# Patient Record
Sex: FEMALE | Race: WHITE | NOT HISPANIC OR LATINO | Employment: FULL TIME | URBAN - METROPOLITAN AREA
[De-identification: names, ages, dates, MRNs, and addresses within clinical notes are randomized per-mention and may not be internally consistent; named-entity substitution may affect disease eponyms.]

---

## 2017-02-28 ENCOUNTER — TRANSCRIBE ORDERS (OUTPATIENT)
Dept: ADMINISTRATIVE | Facility: HOSPITAL | Age: 37
End: 2017-02-28

## 2017-02-28 DIAGNOSIS — M25.532 LEFT WRIST PAIN: ICD-10-CM

## 2017-02-28 DIAGNOSIS — M25.531 RIGHT WRIST PAIN: Primary | ICD-10-CM

## 2017-03-13 ENCOUNTER — HOSPITAL ENCOUNTER (OUTPATIENT)
Dept: NEUROLOGY | Facility: CLINIC | Age: 37
Discharge: HOME | End: 2017-03-13
Payer: COMMERCIAL

## 2017-03-13 DIAGNOSIS — M25.532 LEFT WRIST PAIN: ICD-10-CM

## 2017-03-13 DIAGNOSIS — G56.03 BILATERAL CARPAL TUNNEL SYNDROME: ICD-10-CM

## 2017-03-13 DIAGNOSIS — M25.531 RIGHT WRIST PAIN: ICD-10-CM

## 2017-03-13 PROCEDURE — 95886 MUSC TEST DONE W/N TEST COMP: CPT

## 2017-03-13 PROCEDURE — 95913 NRV CNDJ TEST 13/> STUDIES: CPT

## 2017-05-24 ENCOUNTER — HOSPITAL ENCOUNTER (OUTPATIENT)
Dept: RADIOLOGY | Facility: CLINIC | Age: 37
Discharge: HOME | End: 2017-05-24
Admitting: EMERGENCY MEDICINE
Payer: COMMERCIAL

## 2017-05-24 ENCOUNTER — OFFICE VISIT (OUTPATIENT)
Dept: URGENT CARE | Facility: CLINIC | Age: 37
End: 2017-05-24
Payer: COMMERCIAL

## 2017-05-24 DIAGNOSIS — Z13.820 ENCOUNTER FOR SCREENING FOR OSTEOPOROSIS: ICD-10-CM

## 2017-05-24 DIAGNOSIS — M25.572 PAIN IN LEFT ANKLE: ICD-10-CM

## 2017-05-24 PROCEDURE — S9088 SERVICES PROVIDED IN URGENT: HCPCS

## 2017-05-24 PROCEDURE — 73610 X-RAY EXAM OF ANKLE: CPT

## 2017-05-24 PROCEDURE — 99213 OFFICE O/P EST LOW 20 MIN: CPT

## 2017-07-21 ENCOUNTER — APPOINTMENT (EMERGENCY)
Dept: RADIOLOGY | Facility: HOSPITAL | Age: 37
End: 2017-07-21
Payer: COMMERCIAL

## 2017-07-21 ENCOUNTER — HOSPITAL ENCOUNTER (EMERGENCY)
Facility: HOSPITAL | Age: 37
Discharge: HOME | End: 2017-07-22
Attending: EMERGENCY MEDICINE | Admitting: EMERGENCY MEDICINE
Payer: COMMERCIAL

## 2017-07-21 DIAGNOSIS — N39.0 URINARY TRACT INFECTION: Primary | ICD-10-CM

## 2017-07-21 LAB
ALBUMIN SERPL BCP-MCNC: 3.6 G/DL (ref 3.5–5)
ALP SERPL-CCNC: 70 U/L (ref 46–116)
ALT SERPL W P-5'-P-CCNC: 35 U/L (ref 12–78)
ANION GAP SERPL CALCULATED.3IONS-SCNC: 7 MMOL/L (ref 4–13)
AST SERPL W P-5'-P-CCNC: 18 U/L (ref 5–45)
BACTERIA UR QL AUTO: ABNORMAL /HPF
BASOPHILS # BLD AUTO: 0.04 THOUSANDS/ΜL (ref 0–0.1)
BASOPHILS NFR BLD AUTO: 1 % (ref 0–1)
BILIRUB SERPL-MCNC: 0.2 MG/DL (ref 0.2–1)
BILIRUB UR QL STRIP: NEGATIVE
BUN SERPL-MCNC: 10 MG/DL (ref 5–25)
CALCIUM SERPL-MCNC: 8.6 MG/DL (ref 8.3–10.1)
CHLORIDE SERPL-SCNC: 104 MMOL/L (ref 100–108)
CLARITY UR: CLEAR
CO2 SERPL-SCNC: 29 MMOL/L (ref 21–32)
COLOR UR: YELLOW
CREAT SERPL-MCNC: 0.8 MG/DL (ref 0.6–1.3)
EOSINOPHIL # BLD AUTO: 0.26 THOUSAND/ΜL (ref 0–0.61)
EOSINOPHIL NFR BLD AUTO: 3 % (ref 0–6)
ERYTHROCYTE [DISTWIDTH] IN BLOOD BY AUTOMATED COUNT: 12.7 % (ref 11.6–15.1)
GFR SERPL CREATININE-BSD FRML MDRD: >60 ML/MIN/1.73SQ M
GLUCOSE SERPL-MCNC: 92 MG/DL (ref 65–140)
GLUCOSE UR STRIP-MCNC: NEGATIVE MG/DL
HCG UR QL: NEGATIVE
HCT VFR BLD AUTO: 37.4 % (ref 34.8–46.1)
HGB BLD-MCNC: 12.7 G/DL (ref 11.5–15.4)
HGB UR QL STRIP.AUTO: ABNORMAL
KETONES UR STRIP-MCNC: NEGATIVE MG/DL
LEUKOCYTE ESTERASE UR QL STRIP: NEGATIVE
LIPASE SERPL-CCNC: 105 U/L (ref 73–393)
LYMPHOCYTES # BLD AUTO: 3.17 THOUSANDS/ΜL (ref 0.6–4.47)
LYMPHOCYTES NFR BLD AUTO: 36 % (ref 14–44)
MCH RBC QN AUTO: 29.3 PG (ref 26.8–34.3)
MCHC RBC AUTO-ENTMCNC: 34 G/DL (ref 31.4–37.4)
MCV RBC AUTO: 86 FL (ref 82–98)
MONOCYTES # BLD AUTO: 0.49 THOUSAND/ΜL (ref 0.17–1.22)
MONOCYTES NFR BLD AUTO: 6 % (ref 4–12)
NEUTROPHILS # BLD AUTO: 4.8 THOUSANDS/ΜL (ref 1.85–7.62)
NEUTS SEG NFR BLD AUTO: 54 % (ref 43–75)
NITRITE UR QL STRIP: NEGATIVE
NON-SQ EPI CELLS URNS QL MICRO: ABNORMAL /HPF
PH UR STRIP.AUTO: 5.5 [PH] (ref 4.5–8)
PLATELET # BLD AUTO: 314 THOUSANDS/UL (ref 149–390)
PMV BLD AUTO: 9.6 FL (ref 8.9–12.7)
POTASSIUM SERPL-SCNC: 3.9 MMOL/L (ref 3.5–5.3)
PROT SERPL-MCNC: 7.6 G/DL (ref 6.4–8.2)
PROT UR STRIP-MCNC: NEGATIVE MG/DL
RBC # BLD AUTO: 4.34 MILLION/UL (ref 3.81–5.12)
RBC #/AREA URNS AUTO: ABNORMAL /HPF
SODIUM SERPL-SCNC: 140 MMOL/L (ref 136–145)
SP GR UR STRIP.AUTO: >=1.03 (ref 1–1.03)
UROBILINOGEN UR QL STRIP.AUTO: 0.2 E.U./DL
WBC # BLD AUTO: 8.76 THOUSAND/UL (ref 4.31–10.16)
WBC #/AREA URNS AUTO: ABNORMAL /HPF

## 2017-07-21 PROCEDURE — 81025 URINE PREGNANCY TEST: CPT | Performed by: EMERGENCY MEDICINE

## 2017-07-21 PROCEDURE — 71020 HB CHEST X-RAY 2VW FRONTAL&LATL: CPT

## 2017-07-21 PROCEDURE — 80053 COMPREHEN METABOLIC PANEL: CPT | Performed by: EMERGENCY MEDICINE

## 2017-07-21 PROCEDURE — 84443 ASSAY THYROID STIM HORMONE: CPT | Performed by: EMERGENCY MEDICINE

## 2017-07-21 PROCEDURE — 85025 COMPLETE CBC W/AUTO DIFF WBC: CPT | Performed by: EMERGENCY MEDICINE

## 2017-07-21 PROCEDURE — 81001 URINALYSIS AUTO W/SCOPE: CPT | Performed by: EMERGENCY MEDICINE

## 2017-07-21 PROCEDURE — 96374 THER/PROPH/DIAG INJ IV PUSH: CPT

## 2017-07-21 PROCEDURE — 87070 CULTURE OTHR SPECIMN AEROBIC: CPT | Performed by: EMERGENCY MEDICINE

## 2017-07-21 PROCEDURE — 83690 ASSAY OF LIPASE: CPT | Performed by: EMERGENCY MEDICINE

## 2017-07-21 PROCEDURE — 36415 COLL VENOUS BLD VENIPUNCTURE: CPT | Performed by: EMERGENCY MEDICINE

## 2017-07-21 PROCEDURE — 96361 HYDRATE IV INFUSION ADD-ON: CPT

## 2017-07-21 RX ORDER — LEVOTHYROXINE SODIUM 0.05 MG/1
TABLET ORAL
COMMUNITY
Start: 2015-11-09 | End: 2017-09-10

## 2017-07-21 RX ORDER — IPRATROPIUM BROMIDE AND ALBUTEROL SULFATE 2.5; .5 MG/3ML; MG/3ML
3 SOLUTION RESPIRATORY (INHALATION) ONCE
Status: COMPLETED | OUTPATIENT
Start: 2017-07-21 | End: 2017-07-21

## 2017-07-21 RX ORDER — ALBUTEROL SULFATE 2.5 MG/3ML
2.5 SOLUTION RESPIRATORY (INHALATION) ONCE
Status: COMPLETED | OUTPATIENT
Start: 2017-07-21 | End: 2017-07-21

## 2017-07-21 RX ORDER — KETOROLAC TROMETHAMINE 30 MG/ML
15 INJECTION, SOLUTION INTRAMUSCULAR; INTRAVENOUS ONCE
Status: COMPLETED | OUTPATIENT
Start: 2017-07-21 | End: 2017-07-21

## 2017-07-21 RX ADMIN — KETOROLAC TROMETHAMINE 15 MG: 30 INJECTION, SOLUTION INTRAMUSCULAR; INTRAVENOUS at 22:55

## 2017-07-21 RX ADMIN — SODIUM CHLORIDE 1000 ML: 0.9 INJECTION, SOLUTION INTRAVENOUS at 22:54

## 2017-07-21 RX ADMIN — ALBUTEROL SULFATE 2.5 MG: 2.5 SOLUTION RESPIRATORY (INHALATION) at 22:49

## 2017-07-21 RX ADMIN — IPRATROPIUM BROMIDE AND ALBUTEROL SULFATE 3 ML: .5; 3 SOLUTION RESPIRATORY (INHALATION) at 23:37

## 2017-07-22 VITALS
TEMPERATURE: 97.8 F | DIASTOLIC BLOOD PRESSURE: 64 MMHG | HEART RATE: 72 BPM | SYSTOLIC BLOOD PRESSURE: 112 MMHG | OXYGEN SATURATION: 98 % | HEIGHT: 72 IN | WEIGHT: 293 LBS | RESPIRATION RATE: 21 BRPM | BODY MASS INDEX: 39.68 KG/M2

## 2017-07-22 LAB — TSH SERPL DL<=0.05 MIU/L-ACNC: 3.73 UIU/ML (ref 0.36–3.74)

## 2017-07-22 PROCEDURE — 99284 EMERGENCY DEPT VISIT MOD MDM: CPT

## 2017-07-22 PROCEDURE — 51798 US URINE CAPACITY MEASURE: CPT

## 2017-07-22 PROCEDURE — 94640 AIRWAY INHALATION TREATMENT: CPT

## 2017-07-22 RX ORDER — CIPROFLOXACIN 250 MG/1
250 TABLET, FILM COATED ORAL EVERY 12 HOURS
Qty: 6 TABLET | Refills: 0 | Status: SHIPPED | OUTPATIENT
Start: 2017-07-22 | End: 2017-07-25

## 2017-07-22 RX ORDER — PHENAZOPYRIDINE HYDROCHLORIDE 200 MG/1
200 TABLET, FILM COATED ORAL
Qty: 10 TABLET | Refills: 0 | Status: SHIPPED | OUTPATIENT
Start: 2017-07-22 | End: 2017-09-10

## 2017-07-22 RX ORDER — PHENAZOPYRIDINE HYDROCHLORIDE 100 MG/1
200 TABLET, FILM COATED ORAL ONCE
Status: COMPLETED | OUTPATIENT
Start: 2017-07-22 | End: 2017-07-22

## 2017-07-22 RX ORDER — ALBUTEROL SULFATE 90 UG/1
AEROSOL, METERED RESPIRATORY (INHALATION)
Qty: 1 INHALER | Refills: 0 | Status: SHIPPED | OUTPATIENT
Start: 2017-07-22 | End: 2018-04-16

## 2017-07-22 RX ORDER — IPRATROPIUM BROMIDE AND ALBUTEROL SULFATE 2.5; .5 MG/3ML; MG/3ML
3 SOLUTION RESPIRATORY (INHALATION) ONCE
Status: DISCONTINUED | OUTPATIENT
Start: 2017-07-22 | End: 2017-07-22 | Stop reason: HOSPADM

## 2017-07-22 RX ORDER — PREDNISONE 20 MG/1
40 TABLET ORAL ONCE
Status: COMPLETED | OUTPATIENT
Start: 2017-07-22 | End: 2017-07-22

## 2017-07-22 RX ORDER — CIPROFLOXACIN 250 MG/1
250 TABLET, FILM COATED ORAL ONCE
Status: COMPLETED | OUTPATIENT
Start: 2017-07-22 | End: 2017-07-22

## 2017-07-22 RX ADMIN — PHENAZOPYRIDINE HYDROCHLORIDE 200 MG: 100 TABLET ORAL at 01:44

## 2017-07-22 RX ADMIN — CIPROFLOXACIN HYDROCHLORIDE 250 MG: 250 TABLET, FILM COATED ORAL at 01:46

## 2017-07-22 RX ADMIN — PREDNISONE 40 MG: 20 TABLET ORAL at 01:45

## 2017-07-24 LAB — BACTERIA THROAT CULT: NORMAL

## 2017-09-07 ENCOUNTER — APPOINTMENT (EMERGENCY)
Dept: CT IMAGING | Facility: HOSPITAL | Age: 37
End: 2017-09-07
Payer: COMMERCIAL

## 2017-09-07 ENCOUNTER — HOSPITAL ENCOUNTER (EMERGENCY)
Facility: HOSPITAL | Age: 37
Discharge: HOME | End: 2017-09-07
Attending: EMERGENCY MEDICINE | Admitting: EMERGENCY MEDICINE
Payer: COMMERCIAL

## 2017-09-07 ENCOUNTER — APPOINTMENT (EMERGENCY)
Dept: ULTRASOUND IMAGING | Facility: HOSPITAL | Age: 37
End: 2017-09-07
Payer: COMMERCIAL

## 2017-09-07 VITALS
DIASTOLIC BLOOD PRESSURE: 58 MMHG | TEMPERATURE: 97.2 F | SYSTOLIC BLOOD PRESSURE: 111 MMHG | WEIGHT: 293 LBS | HEART RATE: 58 BPM | BODY MASS INDEX: 39.68 KG/M2 | RESPIRATION RATE: 17 BRPM | HEIGHT: 72 IN | OXYGEN SATURATION: 96 %

## 2017-09-07 DIAGNOSIS — R51.9 ACUTE HEADACHE: Primary | ICD-10-CM

## 2017-09-07 DIAGNOSIS — R93.5 ABNORMAL CT SCAN, PELVIS: ICD-10-CM

## 2017-09-07 DIAGNOSIS — K57.32 SIGMOID DIVERTICULITIS: ICD-10-CM

## 2017-09-07 DIAGNOSIS — N83.201 CYST OF RIGHT OVARY: ICD-10-CM

## 2017-09-07 LAB
ALBUMIN SERPL BCP-MCNC: 3.6 G/DL (ref 3.5–5)
ALP SERPL-CCNC: 68 U/L (ref 46–116)
ALT SERPL W P-5'-P-CCNC: 33 U/L (ref 12–78)
ANION GAP SERPL CALCULATED.3IONS-SCNC: 6 MMOL/L (ref 4–13)
APPEARANCE CSF: CLEAR
AST SERPL W P-5'-P-CCNC: 15 U/L (ref 5–45)
BACTERIA UR QL AUTO: ABNORMAL /HPF
BASOPHILS # BLD AUTO: 0.02 THOUSANDS/ΜL (ref 0–0.1)
BASOPHILS NFR BLD AUTO: 0 % (ref 0–1)
BILIRUB SERPL-MCNC: 0.2 MG/DL (ref 0.2–1)
BILIRUB UR QL STRIP: NEGATIVE
BUN SERPL-MCNC: 12 MG/DL (ref 5–25)
CALCIUM SERPL-MCNC: 8.9 MG/DL (ref 8.3–10.1)
CHLORIDE SERPL-SCNC: 104 MMOL/L (ref 100–108)
CLARITY UR: NORMAL
CO2 SERPL-SCNC: 30 MMOL/L (ref 21–32)
COLOR UR: YELLOW
CREAT SERPL-MCNC: 0.8 MG/DL (ref 0.6–1.3)
EOSINOPHIL # BLD AUTO: 0.16 THOUSAND/ΜL (ref 0–0.61)
EOSINOPHIL NFR BLD AUTO: 2 % (ref 0–6)
ERYTHROCYTE [DISTWIDTH] IN BLOOD BY AUTOMATED COUNT: 12.3 % (ref 11.6–15.1)
GFR SERPL CREATININE-BSD FRML MDRD: 95 ML/MIN/1.73SQ M
GLUCOSE CSF-MCNC: 65 MG/DL (ref 50–80)
GLUCOSE SERPL-MCNC: 98 MG/DL (ref 65–140)
GLUCOSE UR STRIP-MCNC: NEGATIVE MG/DL
GRAM STN SPEC: NORMAL
HCG UR QL: NEGATIVE
HCT VFR BLD AUTO: 38.9 % (ref 34.8–46.1)
HGB BLD-MCNC: 12.9 G/DL (ref 11.5–15.4)
HGB UR QL STRIP.AUTO: NORMAL
INR PPP: 0.92 (ref 0.86–1.16)
KETONES UR STRIP-MCNC: NEGATIVE MG/DL
LEUKOCYTE ESTERASE UR QL STRIP: NEGATIVE
LIPASE SERPL-CCNC: 110 U/L (ref 73–393)
LYMPHOCYTES # BLD AUTO: 2.17 THOUSANDS/ΜL (ref 0.6–4.47)
LYMPHOCYTES NFR BLD AUTO: 32 % (ref 14–44)
MAGNESIUM SERPL-MCNC: 1.9 MG/DL (ref 1.6–2.6)
MCH RBC QN AUTO: 28.6 PG (ref 26.8–34.3)
MCHC RBC AUTO-ENTMCNC: 33.2 G/DL (ref 31.4–37.4)
MCV RBC AUTO: 86 FL (ref 82–98)
MONOCYTES # BLD AUTO: 0.47 THOUSAND/ΜL (ref 0.17–1.22)
MONOCYTES NFR BLD AUTO: 7 % (ref 4–12)
NEUTROPHILS # BLD AUTO: 4.02 THOUSANDS/ΜL (ref 1.85–7.62)
NEUTS SEG NFR BLD AUTO: 59 % (ref 43–75)
NITRITE UR QL STRIP: NEGATIVE
NON-SQ EPI CELLS URNS QL MICRO: ABNORMAL /HPF
PH UR STRIP.AUTO: 5.5 [PH] (ref 4.5–8)
PLATELET # BLD AUTO: 301 THOUSANDS/UL (ref 149–390)
PMV BLD AUTO: 9.9 FL (ref 8.9–12.7)
POTASSIUM SERPL-SCNC: 4 MMOL/L (ref 3.5–5.3)
PROT CSF-MCNC: 34 MG/DL (ref 15–45)
PROT SERPL-MCNC: 7.3 G/DL (ref 6.4–8.2)
PROT UR STRIP-MCNC: NEGATIVE MG/DL
PROTHROMBIN TIME: 12.3 SECONDS (ref 12.1–14.4)
RBC # BLD AUTO: 4.51 MILLION/UL (ref 3.81–5.12)
RBC # CSF MANUAL: 0 UL (ref 0–10)
RBC # CSF MANUAL: 2 UL (ref 0–10)
RBC #/AREA URNS AUTO: ABNORMAL /HPF
SODIUM SERPL-SCNC: 140 MMOL/L (ref 136–145)
SP GR UR STRIP.AUTO: 1.02 (ref 1–1.03)
TOTAL CELLS COUNTED BLD: NO
TUBE # CSF: 1
UROBILINOGEN UR QL STRIP.AUTO: 0.2 E.U./DL
WBC # BLD AUTO: 6.84 THOUSAND/UL (ref 4.31–10.16)
WBC # CSF AUTO: 1 /UL (ref 0–5)
WBC #/AREA URNS AUTO: ABNORMAL /HPF

## 2017-09-07 PROCEDURE — 80053 COMPREHEN METABOLIC PANEL: CPT | Performed by: EMERGENCY MEDICINE

## 2017-09-07 PROCEDURE — 99284 EMERGENCY DEPT VISIT MOD MDM: CPT

## 2017-09-07 PROCEDURE — 81025 URINE PREGNANCY TEST: CPT | Performed by: EMERGENCY MEDICINE

## 2017-09-07 PROCEDURE — 87070 CULTURE OTHR SPECIMN AEROBIC: CPT | Performed by: EMERGENCY MEDICINE

## 2017-09-07 PROCEDURE — 36415 COLL VENOUS BLD VENIPUNCTURE: CPT | Performed by: EMERGENCY MEDICINE

## 2017-09-07 PROCEDURE — 85610 PROTHROMBIN TIME: CPT | Performed by: EMERGENCY MEDICINE

## 2017-09-07 PROCEDURE — 76830 TRANSVAGINAL US NON-OB: CPT

## 2017-09-07 PROCEDURE — 84157 ASSAY OF PROTEIN OTHER: CPT | Performed by: EMERGENCY MEDICINE

## 2017-09-07 PROCEDURE — 89051 BODY FLUID CELL COUNT: CPT | Performed by: EMERGENCY MEDICINE

## 2017-09-07 PROCEDURE — 96365 THER/PROPH/DIAG IV INF INIT: CPT

## 2017-09-07 PROCEDURE — 81001 URINALYSIS AUTO W/SCOPE: CPT | Performed by: EMERGENCY MEDICINE

## 2017-09-07 PROCEDURE — 83690 ASSAY OF LIPASE: CPT | Performed by: EMERGENCY MEDICINE

## 2017-09-07 PROCEDURE — 83735 ASSAY OF MAGNESIUM: CPT | Performed by: EMERGENCY MEDICINE

## 2017-09-07 PROCEDURE — 85025 COMPLETE CBC W/AUTO DIFF WBC: CPT | Performed by: EMERGENCY MEDICINE

## 2017-09-07 PROCEDURE — 89050 BODY FLUID CELL COUNT: CPT | Performed by: EMERGENCY MEDICINE

## 2017-09-07 PROCEDURE — 76856 US EXAM PELVIC COMPLETE: CPT

## 2017-09-07 PROCEDURE — 74177 CT ABD & PELVIS W/CONTRAST: CPT

## 2017-09-07 PROCEDURE — 96375 TX/PRO/DX INJ NEW DRUG ADDON: CPT

## 2017-09-07 PROCEDURE — 82945 GLUCOSE OTHER FLUID: CPT | Performed by: EMERGENCY MEDICINE

## 2017-09-07 PROCEDURE — 96361 HYDRATE IV INFUSION ADD-ON: CPT

## 2017-09-07 PROCEDURE — 70450 CT HEAD/BRAIN W/O DYE: CPT

## 2017-09-07 RX ORDER — ONDANSETRON 8 MG/1
8 TABLET, ORALLY DISINTEGRATING ORAL EVERY 8 HOURS PRN
Qty: 20 TABLET | Refills: 0 | Status: SHIPPED | OUTPATIENT
Start: 2017-09-07 | End: 2017-11-08 | Stop reason: ALTCHOICE

## 2017-09-07 RX ORDER — KETOROLAC TROMETHAMINE 30 MG/ML
10 INJECTION, SOLUTION INTRAMUSCULAR; INTRAVENOUS ONCE
Status: COMPLETED | OUTPATIENT
Start: 2017-09-07 | End: 2017-09-07

## 2017-09-07 RX ORDER — METOCLOPRAMIDE HYDROCHLORIDE 5 MG/ML
10 INJECTION INTRAMUSCULAR; INTRAVENOUS ONCE
Status: COMPLETED | OUTPATIENT
Start: 2017-09-07 | End: 2017-09-07

## 2017-09-07 RX ORDER — DIPHENHYDRAMINE HYDROCHLORIDE 50 MG/ML
25 INJECTION INTRAMUSCULAR; INTRAVENOUS ONCE
Status: COMPLETED | OUTPATIENT
Start: 2017-09-07 | End: 2017-09-07

## 2017-09-07 RX ORDER — METRONIDAZOLE 500 MG/1
500 TABLET ORAL EVERY 8 HOURS SCHEDULED
Qty: 15 TABLET | Refills: 0 | Status: SHIPPED | OUTPATIENT
Start: 2017-09-07 | End: 2017-09-10

## 2017-09-07 RX ORDER — MAGNESIUM SULFATE 1 G/100ML
1 INJECTION INTRAVENOUS ONCE
Status: COMPLETED | OUTPATIENT
Start: 2017-09-07 | End: 2017-09-07

## 2017-09-07 RX ORDER — LEVOFLOXACIN 750 MG/1
750 TABLET ORAL EVERY 24 HOURS
Qty: 5 TABLET | Refills: 0 | Status: SHIPPED | OUTPATIENT
Start: 2017-09-07 | End: 2017-09-10

## 2017-09-07 RX ADMIN — SODIUM CHLORIDE 1000 ML: 0.9 INJECTION, SOLUTION INTRAVENOUS at 15:35

## 2017-09-07 RX ADMIN — MAGNESIUM SULFATE HEPTAHYDRATE 1 G: 1 INJECTION, SOLUTION INTRAVENOUS at 15:38

## 2017-09-07 RX ADMIN — IOHEXOL 100 ML: 350 INJECTION, SOLUTION INTRAVENOUS at 16:18

## 2017-09-07 RX ADMIN — METOCLOPRAMIDE 10 MG: 5 INJECTION, SOLUTION INTRAMUSCULAR; INTRAVENOUS at 15:36

## 2017-09-07 RX ADMIN — KETOROLAC TROMETHAMINE 9.9 MG: 30 INJECTION, SOLUTION INTRAMUSCULAR; INTRAVENOUS at 19:37

## 2017-09-07 RX ADMIN — DIPHENHYDRAMINE HYDROCHLORIDE 25 MG: 50 INJECTION, SOLUTION INTRAMUSCULAR; INTRAVENOUS at 15:37

## 2017-09-10 ENCOUNTER — HOSPITAL ENCOUNTER (EMERGENCY)
Facility: HOSPITAL | Age: 37
Discharge: HOME | End: 2017-09-10
Attending: EMERGENCY MEDICINE | Admitting: EMERGENCY MEDICINE
Payer: COMMERCIAL

## 2017-09-10 VITALS
HEART RATE: 52 BPM | TEMPERATURE: 98.4 F | BODY MASS INDEX: 39.68 KG/M2 | HEIGHT: 72 IN | DIASTOLIC BLOOD PRESSURE: 65 MMHG | OXYGEN SATURATION: 98 % | WEIGHT: 293 LBS | RESPIRATION RATE: 16 BRPM | SYSTOLIC BLOOD PRESSURE: 115 MMHG

## 2017-09-10 DIAGNOSIS — R51.9 HEADACHE: Primary | ICD-10-CM

## 2017-09-10 LAB
ALBUMIN SERPL BCP-MCNC: 3.5 G/DL (ref 3.5–5)
ALP SERPL-CCNC: 62 U/L (ref 46–116)
ALT SERPL W P-5'-P-CCNC: 38 U/L (ref 12–78)
ANION GAP SERPL CALCULATED.3IONS-SCNC: 6 MMOL/L (ref 4–13)
AST SERPL W P-5'-P-CCNC: 18 U/L (ref 5–45)
BASOPHILS # BLD AUTO: 0.02 THOUSANDS/ΜL (ref 0–0.1)
BASOPHILS NFR BLD AUTO: 0 % (ref 0–1)
BILIRUB SERPL-MCNC: 0.2 MG/DL (ref 0.2–1)
BUN SERPL-MCNC: 7 MG/DL (ref 5–25)
CALCIUM SERPL-MCNC: 8.5 MG/DL (ref 8.3–10.1)
CHLORIDE SERPL-SCNC: 105 MMOL/L (ref 100–108)
CO2 SERPL-SCNC: 30 MMOL/L (ref 21–32)
CREAT SERPL-MCNC: 0.78 MG/DL (ref 0.6–1.3)
EOSINOPHIL # BLD AUTO: 0.19 THOUSAND/ΜL (ref 0–0.61)
EOSINOPHIL NFR BLD AUTO: 3 % (ref 0–6)
ERYTHROCYTE [DISTWIDTH] IN BLOOD BY AUTOMATED COUNT: 12.3 % (ref 11.6–15.1)
ERYTHROCYTE [SEDIMENTATION RATE] IN BLOOD: 15 MM/HOUR (ref 0–20)
GFR SERPL CREATININE-BSD FRML MDRD: 98 ML/MIN/1.73SQ M
GLUCOSE SERPL-MCNC: 93 MG/DL (ref 65–140)
HCG UR QL: NEGATIVE
HCT VFR BLD AUTO: 38.7 % (ref 34.8–46.1)
HGB BLD-MCNC: 12.9 G/DL (ref 11.5–15.4)
LYMPHOCYTES # BLD AUTO: 2.09 THOUSANDS/ΜL (ref 0.6–4.47)
LYMPHOCYTES NFR BLD AUTO: 30 % (ref 14–44)
MCH RBC QN AUTO: 28.9 PG (ref 26.8–34.3)
MCHC RBC AUTO-ENTMCNC: 33.3 G/DL (ref 31.4–37.4)
MCV RBC AUTO: 87 FL (ref 82–98)
MONOCYTES # BLD AUTO: 0.33 THOUSAND/ΜL (ref 0.17–1.22)
MONOCYTES NFR BLD AUTO: 5 % (ref 4–12)
NEUTROPHILS # BLD AUTO: 4.32 THOUSANDS/ΜL (ref 1.85–7.62)
NEUTS SEG NFR BLD AUTO: 62 % (ref 43–75)
PLATELET # BLD AUTO: 266 THOUSANDS/UL (ref 149–390)
PMV BLD AUTO: 9.8 FL (ref 8.9–12.7)
POTASSIUM SERPL-SCNC: 4.1 MMOL/L (ref 3.5–5.3)
PROT SERPL-MCNC: 7.1 G/DL (ref 6.4–8.2)
RBC # BLD AUTO: 4.47 MILLION/UL (ref 3.81–5.12)
SODIUM SERPL-SCNC: 141 MMOL/L (ref 136–145)
WBC # BLD AUTO: 6.95 THOUSAND/UL (ref 4.31–10.16)

## 2017-09-10 PROCEDURE — 85025 COMPLETE CBC W/AUTO DIFF WBC: CPT | Performed by: EMERGENCY MEDICINE

## 2017-09-10 PROCEDURE — 96375 TX/PRO/DX INJ NEW DRUG ADDON: CPT

## 2017-09-10 PROCEDURE — 96374 THER/PROPH/DIAG INJ IV PUSH: CPT

## 2017-09-10 PROCEDURE — 85652 RBC SED RATE AUTOMATED: CPT | Performed by: EMERGENCY MEDICINE

## 2017-09-10 PROCEDURE — 99283 EMERGENCY DEPT VISIT LOW MDM: CPT

## 2017-09-10 PROCEDURE — 96361 HYDRATE IV INFUSION ADD-ON: CPT

## 2017-09-10 PROCEDURE — 80053 COMPREHEN METABOLIC PANEL: CPT | Performed by: EMERGENCY MEDICINE

## 2017-09-10 PROCEDURE — 81025 URINE PREGNANCY TEST: CPT | Performed by: EMERGENCY MEDICINE

## 2017-09-10 RX ORDER — CEPHALEXIN 250 MG/1
500 CAPSULE ORAL ONCE
Status: COMPLETED | OUTPATIENT
Start: 2017-09-10 | End: 2017-09-10

## 2017-09-10 RX ORDER — CAFFEINE CITRATE 20 MG/ML
500 SOLUTION INTRAVENOUS ONCE
Status: DISCONTINUED | OUTPATIENT
Start: 2017-09-10 | End: 2017-09-10

## 2017-09-10 RX ORDER — CAFFEINE CITRATE 20 MG/ML
SOLUTION INTRAVENOUS
Status: DISCONTINUED
Start: 2017-09-10 | End: 2017-09-10 | Stop reason: WASHOUT

## 2017-09-10 RX ORDER — METOCLOPRAMIDE HYDROCHLORIDE 5 MG/ML
10 INJECTION INTRAMUSCULAR; INTRAVENOUS ONCE
Status: COMPLETED | OUTPATIENT
Start: 2017-09-10 | End: 2017-09-10

## 2017-09-10 RX ORDER — DIPHENHYDRAMINE HYDROCHLORIDE 50 MG/ML
25 INJECTION INTRAMUSCULAR; INTRAVENOUS ONCE
Status: COMPLETED | OUTPATIENT
Start: 2017-09-10 | End: 2017-09-10

## 2017-09-10 RX ORDER — HYDROCODONE BITARTRATE AND ACETAMINOPHEN 5; 325 MG/1; MG/1
1 TABLET ORAL EVERY 6 HOURS PRN
COMMUNITY
End: 2017-11-08 | Stop reason: ALTCHOICE

## 2017-09-10 RX ORDER — CEPHALEXIN 500 MG/1
500 CAPSULE ORAL EVERY 6 HOURS SCHEDULED
Qty: 40 CAPSULE | Refills: 0 | Status: SHIPPED | OUTPATIENT
Start: 2017-09-10 | End: 2017-09-20

## 2017-09-10 RX ORDER — CAFFEINE CITRATE 20 MG/ML
180 SOLUTION INTRAVENOUS ONCE
Status: COMPLETED | OUTPATIENT
Start: 2017-09-10 | End: 2017-09-10

## 2017-09-10 RX ORDER — KETOROLAC TROMETHAMINE 30 MG/ML
15 INJECTION, SOLUTION INTRAMUSCULAR; INTRAVENOUS ONCE
Status: COMPLETED | OUTPATIENT
Start: 2017-09-10 | End: 2017-09-10

## 2017-09-10 RX ADMIN — KETOROLAC TROMETHAMINE 15 MG: 30 INJECTION, SOLUTION INTRAMUSCULAR; INTRAVENOUS at 14:07

## 2017-09-10 RX ADMIN — CAFFEINE CITRATE 180 MG: 20 INJECTION, SOLUTION INTRAVENOUS at 14:07

## 2017-09-10 RX ADMIN — METOCLOPRAMIDE 10 MG: 5 INJECTION, SOLUTION INTRAMUSCULAR; INTRAVENOUS at 14:05

## 2017-09-10 RX ADMIN — SODIUM CHLORIDE 1000 ML: 0.9 INJECTION, SOLUTION INTRAVENOUS at 14:04

## 2017-09-10 RX ADMIN — CEPHALEXIN 500 MG: 250 CAPSULE ORAL at 15:42

## 2017-09-10 RX ADMIN — DIPHENHYDRAMINE HYDROCHLORIDE 25 MG: 50 INJECTION, SOLUTION INTRAMUSCULAR; INTRAVENOUS at 14:06

## 2017-09-11 LAB — BACTERIA CSF CULT: NO GROWTH

## 2017-09-13 ENCOUNTER — OFFICE VISIT (OUTPATIENT)
Dept: URGENT CARE | Facility: CLINIC | Age: 37
End: 2017-09-13
Payer: COMMERCIAL

## 2017-09-13 ENCOUNTER — HOSPITAL ENCOUNTER (EMERGENCY)
Facility: HOSPITAL | Age: 37
Discharge: HOME | End: 2017-09-13
Attending: EMERGENCY MEDICINE
Payer: COMMERCIAL

## 2017-09-13 VITALS
RESPIRATION RATE: 18 BRPM | BODY MASS INDEX: 39.68 KG/M2 | SYSTOLIC BLOOD PRESSURE: 110 MMHG | DIASTOLIC BLOOD PRESSURE: 76 MMHG | HEART RATE: 78 BPM | TEMPERATURE: 98 F | OXYGEN SATURATION: 97 % | HEIGHT: 72 IN | WEIGHT: 293 LBS

## 2017-09-13 DIAGNOSIS — R51.9 HEADACHE: Primary | ICD-10-CM

## 2017-09-13 PROCEDURE — 96374 THER/PROPH/DIAG INJ IV PUSH: CPT

## 2017-09-13 PROCEDURE — 99213 OFFICE O/P EST LOW 20 MIN: CPT

## 2017-09-13 PROCEDURE — S9088 SERVICES PROVIDED IN URGENT: HCPCS

## 2017-09-13 PROCEDURE — 99283 EMERGENCY DEPT VISIT LOW MDM: CPT

## 2017-09-13 PROCEDURE — 96375 TX/PRO/DX INJ NEW DRUG ADDON: CPT

## 2017-09-13 RX ORDER — FLUTICASONE PROPIONATE 50 MCG
2 SPRAY, SUSPENSION (ML) NASAL DAILY
Qty: 1 BOTTLE | Refills: 0 | Status: SHIPPED | OUTPATIENT
Start: 2017-09-13 | End: 2017-11-08 | Stop reason: ALTCHOICE

## 2017-09-13 RX ORDER — BUTALBITAL, ACETAMINOPHEN AND CAFFEINE 50; 325; 40 MG/1; MG/1; MG/1
2 TABLET ORAL EVERY 4 HOURS PRN
Qty: 30 TABLET | Refills: 0 | Status: SHIPPED | OUTPATIENT
Start: 2017-09-13 | End: 2017-12-18

## 2017-09-13 RX ORDER — KETOROLAC TROMETHAMINE 30 MG/ML
30 INJECTION, SOLUTION INTRAMUSCULAR; INTRAVENOUS ONCE
Status: COMPLETED | OUTPATIENT
Start: 2017-09-13 | End: 2017-09-13

## 2017-09-13 RX ORDER — METOCLOPRAMIDE HYDROCHLORIDE 5 MG/ML
10 INJECTION INTRAMUSCULAR; INTRAVENOUS ONCE
Status: COMPLETED | OUTPATIENT
Start: 2017-09-13 | End: 2017-09-13

## 2017-09-13 RX ADMIN — KETOROLAC TROMETHAMINE 30 MG: 30 INJECTION, SOLUTION INTRAMUSCULAR; INTRAVENOUS at 22:45

## 2017-09-13 RX ADMIN — METOCLOPRAMIDE 10 MG: 5 INJECTION, SOLUTION INTRAMUSCULAR; INTRAVENOUS at 22:48

## 2017-09-18 ENCOUNTER — ALLSCRIPTS OFFICE VISIT (OUTPATIENT)
Dept: FAMILY MEDICINE CLINIC | Facility: CLINIC | Age: 37
End: 2017-09-18
Payer: COMMERCIAL

## 2017-09-18 DIAGNOSIS — E03.9 HYPOTHYROIDISM: ICD-10-CM

## 2017-09-18 DIAGNOSIS — Z01.818 ENCOUNTER FOR OTHER PREPROCEDURAL EXAMINATION: ICD-10-CM

## 2017-09-18 PROCEDURE — 99213 OFFICE O/P EST LOW 20 MIN: CPT | Performed by: FAMILY MEDICINE

## 2017-09-19 ENCOUNTER — TRANSCRIBE ORDERS (OUTPATIENT)
Dept: ADMINISTRATIVE | Facility: HOSPITAL | Age: 37
End: 2017-09-19

## 2017-09-19 ENCOUNTER — APPOINTMENT (OUTPATIENT)
Dept: LAB | Facility: CLINIC | Age: 37
End: 2017-09-19
Payer: COMMERCIAL

## 2017-09-19 ENCOUNTER — LAB REQUISITION (OUTPATIENT)
Dept: LAB | Facility: HOSPITAL | Age: 37
End: 2017-09-19
Payer: COMMERCIAL

## 2017-09-19 ENCOUNTER — GENERIC CONVERSION - ENCOUNTER (OUTPATIENT)
Dept: OTHER | Facility: OTHER | Age: 37
End: 2017-09-19

## 2017-09-19 DIAGNOSIS — N83.209 CYST OF OVARY, UNSPECIFIED LATERALITY: Primary | ICD-10-CM

## 2017-09-19 DIAGNOSIS — E03.9 HYPOTHYROIDISM: ICD-10-CM

## 2017-09-19 DIAGNOSIS — Z01.419 ENCOUNTER FOR GYNECOLOGICAL EXAMINATION WITHOUT ABNORMAL FINDING: ICD-10-CM

## 2017-09-19 DIAGNOSIS — N83.209 CYST OF OVARY, UNSPECIFIED LATERALITY: ICD-10-CM

## 2017-09-19 LAB
T3 SERPL-MCNC: 1 NG/ML (ref 0.6–1.8)
T4 FREE SERPL-MCNC: 0.75 NG/DL (ref 0.76–1.46)
TSH SERPL DL<=0.05 MIU/L-ACNC: 2.51 UIU/ML (ref 0.36–3.74)

## 2017-09-19 PROCEDURE — 81500 ONCO (OVAR) TWO PROTEINS: CPT

## 2017-09-19 PROCEDURE — 87660 TRICHOMONAS VAGIN DIR PROBE: CPT | Performed by: OBSTETRICS & GYNECOLOGY

## 2017-09-19 PROCEDURE — 87624 HPV HI-RISK TYP POOLED RSLT: CPT | Performed by: OBSTETRICS & GYNECOLOGY

## 2017-09-19 PROCEDURE — 87491 CHLMYD TRACH DNA AMP PROBE: CPT | Performed by: OBSTETRICS & GYNECOLOGY

## 2017-09-19 PROCEDURE — 84439 ASSAY OF FREE THYROXINE: CPT

## 2017-09-19 PROCEDURE — 84443 ASSAY THYROID STIM HORMONE: CPT

## 2017-09-19 PROCEDURE — 87591 N.GONORRHOEAE DNA AMP PROB: CPT | Performed by: OBSTETRICS & GYNECOLOGY

## 2017-09-19 PROCEDURE — 36415 COLL VENOUS BLD VENIPUNCTURE: CPT

## 2017-09-19 PROCEDURE — G0145 SCR C/V CYTO,THINLAYER,RESCR: HCPCS | Performed by: OBSTETRICS & GYNECOLOGY

## 2017-09-19 PROCEDURE — 86376 MICROSOMAL ANTIBODY EACH: CPT

## 2017-09-19 PROCEDURE — 84480 ASSAY TRIIODOTHYRONINE (T3): CPT

## 2017-09-19 PROCEDURE — 87510 GARDNER VAG DNA DIR PROBE: CPT | Performed by: OBSTETRICS & GYNECOLOGY

## 2017-09-19 PROCEDURE — 87480 CANDIDA DNA DIR PROBE: CPT | Performed by: OBSTETRICS & GYNECOLOGY

## 2017-09-20 LAB — THYROPEROXIDASE AB SERPL-ACNC: 14 IU/ML (ref 0–34)

## 2017-09-21 LAB
CANDIDA RRNA VAG QL PROBE: NEGATIVE
G VAGINALIS RRNA GENITAL QL PROBE: NEGATIVE
T VAGINALIS RRNA GENITAL QL PROBE: NEGATIVE

## 2017-09-22 LAB
CHLAMYDIA DNA CVX QL NAA+PROBE: NORMAL
MISCELLANEOUS LAB TEST RESULT: NORMAL
N GONORRHOEA DNA GENITAL QL NAA+PROBE: NORMAL

## 2017-09-25 ENCOUNTER — GENERIC CONVERSION - ENCOUNTER (OUTPATIENT)
Dept: OTHER | Facility: OTHER | Age: 37
End: 2017-09-25

## 2017-09-27 LAB — HPV RRNA GENITAL QL NAA+PROBE: NORMAL

## 2017-09-28 ENCOUNTER — GENERIC CONVERSION - ENCOUNTER (OUTPATIENT)
Dept: OTHER | Facility: OTHER | Age: 37
End: 2017-09-28

## 2017-09-29 ENCOUNTER — GENERIC CONVERSION - ENCOUNTER (OUTPATIENT)
Dept: OTHER | Facility: OTHER | Age: 37
End: 2017-09-29

## 2017-10-02 ENCOUNTER — GENERIC CONVERSION - ENCOUNTER (OUTPATIENT)
Dept: OTHER | Facility: OTHER | Age: 37
End: 2017-10-02

## 2017-10-04 LAB
LAB AP GYN PRIMARY INTERPRETATION: NORMAL
Lab: NORMAL

## 2017-11-02 ENCOUNTER — TRANSCRIBE ORDERS (OUTPATIENT)
Dept: LAB | Facility: CLINIC | Age: 37
End: 2017-11-02

## 2017-11-02 ENCOUNTER — APPOINTMENT (OUTPATIENT)
Dept: LAB | Facility: CLINIC | Age: 37
End: 2017-11-02
Payer: COMMERCIAL

## 2017-11-02 DIAGNOSIS — Z01.818 PREOP EXAMINATION: Primary | ICD-10-CM

## 2017-11-02 DIAGNOSIS — Z01.818 ENCOUNTER FOR OTHER PREPROCEDURAL EXAMINATION: ICD-10-CM

## 2017-11-02 LAB
ERYTHROCYTE [DISTWIDTH] IN BLOOD BY AUTOMATED COUNT: 12.5 % (ref 11.6–15.1)
HCT VFR BLD AUTO: 37.9 % (ref 34.8–46.1)
HGB BLD-MCNC: 12.8 G/DL (ref 11.5–15.4)
MCH RBC QN AUTO: 29.2 PG (ref 26.8–34.3)
MCHC RBC AUTO-ENTMCNC: 33.8 G/DL (ref 31.4–37.4)
MCV RBC AUTO: 87 FL (ref 82–98)
PLATELET # BLD AUTO: 308 THOUSANDS/UL (ref 149–390)
PMV BLD AUTO: 10.4 FL (ref 8.9–12.7)
RBC # BLD AUTO: 4.38 MILLION/UL (ref 3.81–5.12)
WBC # BLD AUTO: 5.34 THOUSAND/UL (ref 4.31–10.16)

## 2017-11-02 PROCEDURE — 36415 COLL VENOUS BLD VENIPUNCTURE: CPT

## 2017-11-02 PROCEDURE — 85027 COMPLETE CBC AUTOMATED: CPT

## 2017-11-03 ENCOUNTER — APPOINTMENT (OUTPATIENT)
Dept: LAB | Facility: CLINIC | Age: 37
End: 2017-11-03
Payer: COMMERCIAL

## 2017-11-03 DIAGNOSIS — Z01.818 PREOP EXAMINATION: ICD-10-CM

## 2017-11-03 LAB
ABO GROUP BLD: NORMAL
BLD GP AB SCN SERPL QL: NEGATIVE
RH BLD: NEGATIVE
SPECIMEN EXPIRATION DATE: NORMAL

## 2017-11-03 PROCEDURE — 86901 BLOOD TYPING SEROLOGIC RH(D): CPT

## 2017-11-03 PROCEDURE — 86850 RBC ANTIBODY SCREEN: CPT

## 2017-11-03 PROCEDURE — 86900 BLOOD TYPING SEROLOGIC ABO: CPT

## 2017-11-08 ENCOUNTER — ANESTHESIA EVENT (OUTPATIENT)
Dept: PERIOP | Facility: HOSPITAL | Age: 37
End: 2017-11-08
Payer: COMMERCIAL

## 2017-11-08 NOTE — ANESTHESIA PREPROCEDURE EVALUATION
Review of Systems/Medical History  Patient summary reviewed    No history of anesthetic complications     Cardiovascular  EKG reviewed,    Pulmonary  Asthma: , ,   Comment: Former Smoker     GI/Hepatic            Endo/Other     GYN       Hematology   Musculoskeletal  Obesity  morbid obesity,        Neurology   Psychology           Physical Exam    Airway    Mallampati score: II  TM Distance: >3 FB  Neck ROM: full     Dental       Cardiovascular      Pulmonary      Other Findings        Anesthesia Plan  ASA Score- 2       Anesthesia Type-   Additional Monitors:   Airway Plan: ETT  Induction- intravenous  Informed Consent- Anesthetic plan and risks discussed with patient

## 2017-11-09 ENCOUNTER — HOSPITAL ENCOUNTER (OUTPATIENT)
Facility: HOSPITAL | Age: 37
Setting detail: OUTPATIENT SURGERY
Discharge: HOME | End: 2017-11-09
Attending: OBSTETRICS & GYNECOLOGY | Admitting: OBSTETRICS & GYNECOLOGY
Payer: COMMERCIAL

## 2017-11-09 ENCOUNTER — ANESTHESIA (OUTPATIENT)
Dept: PERIOP | Facility: HOSPITAL | Age: 37
End: 2017-11-09
Payer: COMMERCIAL

## 2017-11-09 VITALS
DIASTOLIC BLOOD PRESSURE: 66 MMHG | WEIGHT: 293 LBS | BODY MASS INDEX: 39.68 KG/M2 | SYSTOLIC BLOOD PRESSURE: 139 MMHG | HEART RATE: 53 BPM | RESPIRATION RATE: 18 BRPM | OXYGEN SATURATION: 100 % | HEIGHT: 72 IN | TEMPERATURE: 97 F

## 2017-11-09 DIAGNOSIS — N83.209 CYST OF OVARY: ICD-10-CM

## 2017-11-09 PROBLEM — N83.201 RIGHT OVARIAN CYST: Status: ACTIVE | Noted: 2017-11-09

## 2017-11-09 LAB — EXT PREGNANCY TEST URINE: NEGATIVE

## 2017-11-09 PROCEDURE — 81025 URINE PREGNANCY TEST: CPT | Performed by: OBSTETRICS & GYNECOLOGY

## 2017-11-09 PROCEDURE — 88305 TISSUE EXAM BY PATHOLOGIST: CPT | Performed by: OBSTETRICS & GYNECOLOGY

## 2017-11-09 RX ORDER — ROCURONIUM BROMIDE 10 MG/ML
INJECTION, SOLUTION INTRAVENOUS AS NEEDED
Status: DISCONTINUED | OUTPATIENT
Start: 2017-11-09 | End: 2017-11-09 | Stop reason: SURG

## 2017-11-09 RX ORDER — METOCLOPRAMIDE HYDROCHLORIDE 5 MG/ML
10 INJECTION INTRAMUSCULAR; INTRAVENOUS ONCE AS NEEDED
Status: DISCONTINUED | OUTPATIENT
Start: 2017-11-09 | End: 2017-11-09 | Stop reason: HOSPADM

## 2017-11-09 RX ORDER — OXYCODONE HYDROCHLORIDE AND ACETAMINOPHEN 5; 325 MG/1; MG/1
1 TABLET ORAL EVERY 4 HOURS PRN
Qty: 7 TABLET | Refills: 0 | Status: SHIPPED | OUTPATIENT
Start: 2017-11-09 | End: 2017-11-19

## 2017-11-09 RX ORDER — KETOROLAC TROMETHAMINE 30 MG/ML
INJECTION, SOLUTION INTRAMUSCULAR; INTRAVENOUS AS NEEDED
Status: DISCONTINUED | OUTPATIENT
Start: 2017-11-09 | End: 2017-11-09 | Stop reason: SURG

## 2017-11-09 RX ORDER — PROPOFOL 10 MG/ML
INJECTION, EMULSION INTRAVENOUS AS NEEDED
Status: DISCONTINUED | OUTPATIENT
Start: 2017-11-09 | End: 2017-11-09 | Stop reason: SURG

## 2017-11-09 RX ORDER — SODIUM CHLORIDE 9 MG/ML
INJECTION, SOLUTION INTRAVENOUS CONTINUOUS PRN
Status: DISCONTINUED | OUTPATIENT
Start: 2017-11-09 | End: 2017-11-09 | Stop reason: SURG

## 2017-11-09 RX ORDER — ONDANSETRON 2 MG/ML
4 INJECTION INTRAMUSCULAR; INTRAVENOUS ONCE AS NEEDED
Status: DISCONTINUED | OUTPATIENT
Start: 2017-11-09 | End: 2017-11-09 | Stop reason: HOSPADM

## 2017-11-09 RX ORDER — ONDANSETRON 2 MG/ML
INJECTION INTRAMUSCULAR; INTRAVENOUS AS NEEDED
Status: DISCONTINUED | OUTPATIENT
Start: 2017-11-09 | End: 2017-11-09 | Stop reason: SURG

## 2017-11-09 RX ORDER — GLYCOPYRROLATE 0.2 MG/ML
INJECTION INTRAMUSCULAR; INTRAVENOUS AS NEEDED
Status: DISCONTINUED | OUTPATIENT
Start: 2017-11-09 | End: 2017-11-09 | Stop reason: SURG

## 2017-11-09 RX ORDER — LIDOCAINE HYDROCHLORIDE 10 MG/ML
INJECTION, SOLUTION INFILTRATION; PERINEURAL AS NEEDED
Status: DISCONTINUED | OUTPATIENT
Start: 2017-11-09 | End: 2017-11-09 | Stop reason: SURG

## 2017-11-09 RX ORDER — BUPIVACAINE HYDROCHLORIDE 2.5 MG/ML
INJECTION, SOLUTION INFILTRATION; PERINEURAL AS NEEDED
Status: DISCONTINUED | OUTPATIENT
Start: 2017-11-09 | End: 2017-11-09 | Stop reason: HOSPADM

## 2017-11-09 RX ORDER — OXYCODONE HYDROCHLORIDE AND ACETAMINOPHEN 5; 325 MG/1; MG/1
1 TABLET ORAL EVERY 4 HOURS PRN
Status: DISCONTINUED | OUTPATIENT
Start: 2017-11-09 | End: 2017-11-09 | Stop reason: HOSPADM

## 2017-11-09 RX ORDER — MIDAZOLAM HYDROCHLORIDE 1 MG/ML
INJECTION INTRAMUSCULAR; INTRAVENOUS AS NEEDED
Status: DISCONTINUED | OUTPATIENT
Start: 2017-11-09 | End: 2017-11-09 | Stop reason: SURG

## 2017-11-09 RX ORDER — FENTANYL CITRATE 50 UG/ML
INJECTION, SOLUTION INTRAMUSCULAR; INTRAVENOUS AS NEEDED
Status: DISCONTINUED | OUTPATIENT
Start: 2017-11-09 | End: 2017-11-09 | Stop reason: SURG

## 2017-11-09 RX ORDER — IBUPROFEN 600 MG/1
600 TABLET ORAL EVERY 6 HOURS PRN
Status: DISCONTINUED | OUTPATIENT
Start: 2017-11-09 | End: 2017-11-09 | Stop reason: HOSPADM

## 2017-11-09 RX ORDER — ONDANSETRON 2 MG/ML
INJECTION INTRAMUSCULAR; INTRAVENOUS
Status: COMPLETED
Start: 2017-11-09 | End: 2017-11-09

## 2017-11-09 RX ORDER — SODIUM CHLORIDE, SODIUM LACTATE, POTASSIUM CHLORIDE, CALCIUM CHLORIDE 600; 310; 30; 20 MG/100ML; MG/100ML; MG/100ML; MG/100ML
125 INJECTION, SOLUTION INTRAVENOUS CONTINUOUS
Status: DISCONTINUED | OUTPATIENT
Start: 2017-11-09 | End: 2017-11-09 | Stop reason: HOSPADM

## 2017-11-09 RX ORDER — FENTANYL CITRATE/PF 50 MCG/ML
50 SYRINGE (ML) INJECTION
Status: DISCONTINUED | OUTPATIENT
Start: 2017-11-09 | End: 2017-11-09 | Stop reason: HOSPADM

## 2017-11-09 RX ADMIN — FENTANYL CITRATE 50 MCG: 50 INJECTION INTRAMUSCULAR; INTRAVENOUS at 09:21

## 2017-11-09 RX ADMIN — GLYCOPYRROLATE 0.6 MG: 0.2 INJECTION, SOLUTION INTRAMUSCULAR; INTRAVENOUS at 08:57

## 2017-11-09 RX ADMIN — DEXAMETHASONE SODIUM PHOSPHATE 10 MG: 10 INJECTION INTRAMUSCULAR; INTRAVENOUS at 08:02

## 2017-11-09 RX ADMIN — ROCURONIUM BROMIDE 50 MG: 10 INJECTION INTRAVENOUS at 07:50

## 2017-11-09 RX ADMIN — FENTANYL CITRATE 100 MCG: 50 INJECTION INTRAMUSCULAR; INTRAVENOUS at 07:50

## 2017-11-09 RX ADMIN — KETOROLAC TROMETHAMINE 30 MG: 30 INJECTION, SOLUTION INTRAMUSCULAR at 08:52

## 2017-11-09 RX ADMIN — HYDROMORPHONE HYDROCHLORIDE 0.4 MG: 1 INJECTION, SOLUTION INTRAMUSCULAR; INTRAVENOUS; SUBCUTANEOUS at 09:28

## 2017-11-09 RX ADMIN — ONDANSETRON 4 MG: 2 INJECTION INTRAMUSCULAR; INTRAVENOUS at 08:56

## 2017-11-09 RX ADMIN — OXYCODONE HYDROCHLORIDE AND ACETAMINOPHEN 1 TABLET: 5; 325 TABLET ORAL at 10:50

## 2017-11-09 RX ADMIN — FENTANYL CITRATE 50 MCG: 50 INJECTION INTRAMUSCULAR; INTRAVENOUS at 09:25

## 2017-11-09 RX ADMIN — FENTANYL CITRATE 50 MCG: 50 INJECTION INTRAMUSCULAR; INTRAVENOUS at 09:00

## 2017-11-09 RX ADMIN — MIDAZOLAM HYDROCHLORIDE 2 MG: 1 INJECTION, SOLUTION INTRAMUSCULAR; INTRAVENOUS at 07:45

## 2017-11-09 RX ADMIN — PROPOFOL 200 MG: 10 INJECTION, EMULSION INTRAVENOUS at 07:50

## 2017-11-09 RX ADMIN — LIDOCAINE HYDROCHLORIDE 50 MG: 10 INJECTION, SOLUTION INFILTRATION; PERINEURAL at 07:50

## 2017-11-09 RX ADMIN — HYDROMORPHONE HYDROCHLORIDE 0.4 MG: 1 INJECTION, SOLUTION INTRAMUSCULAR; INTRAVENOUS; SUBCUTANEOUS at 09:37

## 2017-11-09 RX ADMIN — PROPOFOL 50 MG: 10 INJECTION, EMULSION INTRAVENOUS at 08:50

## 2017-11-09 RX ADMIN — SODIUM CHLORIDE: 0.9 INJECTION, SOLUTION INTRAVENOUS at 07:44

## 2017-11-09 RX ADMIN — ONDANSETRON 4 MG: 2 INJECTION INTRAMUSCULAR; INTRAVENOUS at 09:53

## 2017-11-09 RX ADMIN — NEOSTIGMINE METHYLSULFATE 3 MG: 1 INJECTION, SOLUTION INTRAMUSCULAR; INTRAVENOUS; SUBCUTANEOUS at 08:57

## 2017-11-09 RX ADMIN — FENTANYL CITRATE 50 MCG: 50 INJECTION INTRAMUSCULAR; INTRAVENOUS at 08:49

## 2017-11-09 NOTE — DISCHARGE INSTRUCTIONS
Salpingectomy   WHAT YOU NEED TO KNOW:   A salpingectomy is surgery to remove one or both of your fallopian tubes  The fallopian tubes carry eggs from the ovaries to the uterus  They are part of a woman's reproductive system  A salpingectomy may be done to treat an ectopic pregnancy, cancer, endometriosis, or an infection  It may also be done to prevent pregnancy or some types of cancer  DISCHARGE INSTRUCTIONS:   Call 911 for any of the following:   · You feel lightheaded, short of breath, and have chest pain  · You cough up blood  · You have trouble breathing  Seek care immediately if:   · Your arm or leg feels warm, tender, and painful  It may look swollen and red  · Blood soaks through your bandage  · Your stitches come apart  · You soak through 1 sanitary pad in 1 hour  · You have trouble urinating or cannot urinate at all  Contact your healthcare provider if:   · You have a fever or chills  · Your wound is red, swollen, or draining pus  · You have pus or a foul-smelling odor coming from your vagina  · Your pain does not get better after you take your medicine  · You have nausea or are vomiting  · Your skin is itchy, swollen, or you have a rash  · You have questions or concerns about your condition or care  Medicines: You may need any of the following:  · Prescription pain medicine  may be given  Ask your healthcare provider how to take this medicine safely  · NSAIDs , such as ibuprofen, help decrease swelling, pain, and fever  NSAIDs can cause stomach bleeding or kidney problems in certain people  If you take blood thinner medicine, always ask your healthcare provider if NSAIDs are safe for you  Always read the medicine label and follow directions  · Take your medicine as directed  Contact your healthcare provider if you think your medicine is not helping or if you have side effects  Tell him or her if you are allergic to any medicine   Keep a list of the medicines, vitamins, and herbs you take  Include the amounts, and when and why you take them  Bring the list or the pill bottles to follow-up visits  Carry your medicine list with you in case of an emergency  Care for your wound as directed:  Ask your healthcare provider when your wound can get wet  Do not take a bath until your healthcare provider says it is okay  Take a shower only  Carefully wash around the wound with soap and water  Let the soap and water gently run over your incision  Do not  scrub your incision  Dry the area and put on new, clean bandages as directed  Change your bandages when they get wet or dirty  If you have strips of medical tape, let them fall off on their own  Activity:  Ask your healthcare provider when you can return to your normal activities  Do not douche, use tampons, or have sex until your healthcare provider says it is okay  These activities may cause infection  Do not exercise or lift anything heavy until your healthcare provider says it is okay  This may put too much stress on your incision  Follow up with your healthcare provider as directed:  Write down your questions so you remember to ask them during your visits  © 2017 [facility] Information is for End User's use only and may not be sold, redistributed or otherwise used for commercial purposes  All illustrations and images included in CareNotes® are the copyrighted property of [employer] ([employer] , [employer]  or [employer]  The above information is an  only  It is not intended as medical advice for individual conditions or treatments  Talk to your doctor, nurse or pharmacist before following any medical regimen to see if it is safe and effective for you

## 2017-11-09 NOTE — OP NOTE
OPERATIVE REPORT  PATIENT NAME: Fly Hyman    :  1980  MRN: [id number]  Pt Location: AN OR ROOM 04    SURGERY DATE: 2017    Surgeon(s) and Role:     * Medina Casper MD - Primary     * Juliana Cornell MD - Assisting    Preop Diagnosis:  Cyst of ovary [N83 209]    Post-Op Diagnosis Codes:  Paratubal cyst    Procedure(s) (LRB):  LAPAROSCOPIC OVARIAN CYSTECTOMY VERSUS OOPHERECTOMY (Right)    Specimen(s):  ID Type Source Tests Collected by Time Destination   1 : RIGHT FALLOPIAN TUBE AND PARATUBAL CYST Tissue Fallopian Tube, Right TISSUE EXAM Medina Casper MD 2017 0850        Estimated Blood Loss:   10 mL     Anesthesia Type:   General    Operative Indications:  Cyst of ovary [N83 209]      Operative Findings:  12 cm simple appearing right paratubal cyst  Normal appearing uterus, tubes, and ovaries    Complications:   None    Procedure and Technique:    Brief History    40year old P2 with pelvic pain secondary to a 12 cm right ovarian cyst on TVUS  Patient scheduled for a right ovarian cystectomy vs right oophorectomy  All risks, benefits, and alternatives to the procedure were discussed with the patient and she had the opportunity to ask questions  Patient expressed desire to continue with tubal sterilization  Informed consent was obtained  Description of Procedure    Patient was taken to the operating room were a time out was performed to confirm correct patient and correct procedure  General endotracheal anesthesia (GET) was administered and the patient was positioned on the OR table in the dorsal lithotomy position  All pressure points were padded and a masood hugger was placed to maintain control of core body temperature  A bimanual exam was performed and the uterus was noted to be midposition, normal in size and consistency with a palpable adnexal mass approximately 3cm inferior to the umbilicus, no fullness   The patient was prepped and draped in the usual sterile fashion with chloroprep on the abdomen and chlorhexadine prep on the vagina and perineum  Operative Technique    A luther catheter was introduced into the bladder, which was drained of 125cc of clear yellow urine  A weighted speculum was inserted into the vagina and Shon was used to visualize the anterior lip of the cervix, which was then grasped with a single toothed tenaculum  A Santos dilator was inserted into the cervix and secured to the tenaculum to be used as a uterine manipulator  The speculum was removed from the vagina  Sterile gloves were then exchanged and attention was turned to the abdomen  A total of 30cc of Lidocaine was injected at the trocar sites  A 5mm incision was made at the superior edge of the umbilicus for introduction of a 5mm trocar  Trocar was introduced under direct visualization  Pneumoperitoneum was then established to a maximum of 15mmHg  The entire abdomen and pelvis was inspected and there was no evidence of injury to bowel, bladder, vasculature, or other structures  Attention was then turned to the pelvis  Patient was placed in Trendelenburg and the uterus was elevated to visualize the fallopian tubes  There was noted to be a large 12 cm simple appearing paratubal cyst on the right fallopian tube  The uterus, and ovaries bilaterally were grossly normal  Left tube also appeared normal  Two additional port sites were selected in the left and right lower abdomen approximately 4cm superior and medial to the iliac crests secondary to body habitus as well as cyst size  A 5mm incision was made for introduction of a 5mm trocar under direct visualization at the right lower quadrant and a 10mm incision was made for introduction of a 10mm trocar at the lower left quadrant  A blunt probe and grasper were inserted through and used to visualize the pelvic anatomy      The right fallopian tube was grasped just proximal to the paratubal cyst with a blunt grasper and elevated to visualize the mesosalpinx  Enseal device was used to ligate along the mesosalpinx, working separate the paratubal cyst and fallopian tube from the ovary and underlying ovarian vasculature  Enseal was used to amputate the right paratubal cyst and fallopian tube  The 10mm trocar was removed and the incision was extended to allow for the introduction of a 15mm trocar for introduction of the 15mm endocatch bag for removal of the paratubal cyst  The paratubal cyst was then punctured and drained in the endocatch bag for clear fluid, removed from the body, and sent for pathology  Good hemostasis was confirmed  The Nezhat was used for irrigation  Pneumoperitoneum was kept for visualization of the fascial repair  Adequate hemostasis was visualized  The left lower trocar was removed under direct visualization  The fascia from the lower left quadrant was grasped with Kochers and repaired using a UR 6 needle  The laparoscope was first used to visualize the fascial repair and confirm hemostasis  It was then withdrawn from the abdomen, followed by its trocar sleeve at the umbilicus  Subcutaneous tissue at the left lower trocar site was closed with a 3-0 Vicryl  Skin incisions were closed with running absorbable suture in standard fashion of 4-0 monocryl  Attention was turned to the vagina  The uterine manipulator was withdrawn  Single toothed tenaculum was removed from the anterior lip of the cervix  Good hemostasis was confirmed  At the conclusion of the procedure, all needle, sponge, and instrument counts were noted to be correct x2  Patient tolerated the procedure well and was transferred to PACU in stable condition prior to discharge with follow up in 1-2 weeks  Dr Claudette Pea was present and participated in all key portions of the case      Patient Disposition:  PACU     SIGNATURE: Marquez Brambila MD  DATE: November 9, 2017  TIME: 9:18 AM

## 2017-11-11 NOTE — PROGRESS NOTES
Assessment   1  Hypothyroidism (244 9) (E03 9)  2  Ovarian cyst (620 2) (N83 209)  3  Headache following lumbar puncture (349 0) (G97 1)    Plan    (1) TSH; Status:Resulted - Requires Verification;   Done: 08NSP0732 12:00AM     Due:90Gwv8820; Ordered;       For:Hypothyroidism; Ordered By:Brigid Little;      (1) T4, FREE; Status:Resulted - Requires Verification;   Done: 21FPN4944 12:00AM     Due:68Yqs7450; Ordered;       For:Hypothyroidism; Ordered By:Brigid Little;      (1) T3 TOTAL; Status:Resulted - Requires Verification;   Done: 91GLN4855 12:00AM     Due:95Rrc1373; Ordered;       For:Hypothyroidism; Ordered By:Brigid Little;      (1) THYROID MICROSOMAL ANTIBODY; Status:Resulted - Requires Verification;   Done: 70PHM0253 12:00AM     Due:86Azm7999; Ordered;       For:Hypothyroidism; Ordered By:Brigid Little; Discussion/Summary      Headache following LP- resolved sinusitis- finish atbnx cyst- appt with obgyn tomorrow of subclinical hypothyroidism- stopped meds, will check levels       The patient was counseled regarding instructions for management,-- importance of compliance with treatment  total time of encounter was 20 minutes-- and-- 10 minutes was spent counseling  Chief Complaint   pt is here for an ER f/u, she was in the ER for headaches and stomach pain  She states she had a spinal tap done that cause problems  The patient is here for an emergency room follow-up  History of Present Illness   37 year here today for follow up from ED, pt was seen in ED for headache on 9/7/17, at that time a LP was completed, she went on vacation and present to [facility] ED for s/p headache, she received a blood patch, she has been seen in ED for headache on 9/10,migraine cocktail given, went to urgent care of 9/13, was sent to ED, for headache and backache  Other dx did include sinusitis, in which she received antbx, ovarian cyst, recommended to see obgyn, mild diverticulitis   Headaches today are minimal to none, she reports feeling better  Lumbar back pain has resolved  [facility]  Finishing antbtx  Review of Systems        Constitutional: No fever, no chills, feels well, no tiredness, no recent weight gain or weight loss  Eyes: No complaints of eye pain, no red eyes, no eyesight problems, no discharge, no dry eyes, no itching of eyes  ENT: no complaints of earache, no loss of hearing, no nose bleeds, no nasal discharge, no sore throat, no hoarseness  Cardiovascular: No complaints of slow heart rate, no fast heart rate, no chest pain, no palpitations, no leg claudication, no lower extremity edema  Respiratory: No complaints of shortness of breath, no wheezing, no cough, no SOB on exertion, no orthopnea, no PND  Gastrointestinal: No complaints of abdominal pain, no constipation, no nausea or vomiting, no diarrhea, no bloody stools  Genitourinary: No complaints of dysuria, no incontinence, no pelvic pain, no dysmenorrhea, no vaginal discharge or bleeding  Musculoskeletal: No complaints of arthralgias, no myalgias, no joint swelling or stiffness, no limb pain or swelling  Integumentary: No complaints of skin rash or lesions, no itching, no skin wounds, no breast pain or lump  Neurological: No complaints of headache, no confusion, no convulsions, no numbness, no dizziness or fainting, no tingling, no limb weakness, no difficulty walking  Psychiatric: Not suicidal, no sleep disturbance, no anxiety or depression, no change in personality, no emotional problems  Endocrine: No complaints of proptosis, no hot flashes, no muscle weakness, no deepening of the voice, no feelings of weakness  Hematologic/Lymphatic: No complaints of swollen glands, no swollen glands in the neck, does not bleed easily, does not bruise easily  Active Problems   1  Calcaneal spur (726 73) (M77 30)  2  Depression screening (V79 0) (Z13 89)  3   Flu vaccine need (V04 81) (Z23)  4  Hypothyroidism (244 9) (E03 9)  5  Mild intermittent asthma without complication (971 48) (W16 49)  6  Obesity (278 00) (E66 9)  7  Seasonal allergies (477 9) (J30 2)    Past Medical History   1  History of Acute left ankle pain (719 47) (M25 572)  2  History of Acute low back pain (724 2) (M54 5)  3  History of Contusion of foot, left (924 20) (S90 32XA)  4  History of Encounter for screening for osteoporosis (V82 81) (Z13 820)  5  History of Heel pain (729 5) (M79 673)  6  History of acute bronchitis (V12 69) (Z87 09)  7  History of asthma (V12 69) (Z87 09)  8  History of cough  9  History of dysuria (V13 00) (Z87 898)  10  History of gastroesophageal reflux (GERD) (V12 79) (Z87 19)  11  History of headache (V13 89) (Z87 898)  12  History of sinusitis (V12 69) (Z87 09)  13  History of Injury of left foot, initial encounter (959 7) (K30 128B)  14  History of Left ankle sprain (845 00) (S93 402A)  15  History of Left ankle tendonitis (727 06) (M77 52)  16  History of No known problems  17  History of Possible pregnancy (V72 40) (Z32 00)  18  History of Wrist pain, acute, right (719 43) (M25 531)  19  History of Wrist pain, chronic, left (082 53,680 40) (M25 532,G89 29)  20  History of Yeast infection (112 9) (B37 9)     The active problems and past medical history were reviewed and updated today  Surgical History   1  History of  Section  2  History of Cholecystectomy  3  History of Tonsillectomy     The surgical history was reviewed and updated today  Family History   Mother   1  Family history of atrial fibrillation (V17 49) (Z82 49)  2  Family history of chronic obstructive pulmonary disease (V17 6) (Z82 5)  Father   3  Family history of diabetes mellitus (V18 0) (Z83 3)  4  Family history of Heart problem  5  Family history of High cholesterol  6  Family history of Hydrocephalus     The family history was reviewed and updated today         Social History    · Denied: History of Drug use   · Never a smoker   · Social alcohol use (Z78 9)  The social history was reviewed and updated today  The social history was reviewed and is unchanged  Current Meds   1  Keflex TABS; Therapy: (Recorded:18Sep2017) to Recorded  2  Ventolin  (90 Base) MCG/ACT Inhalation Aerosol Solution; Therapy: 38Hcr2464 to Recorded     The medication list was reviewed and updated today  Allergies   1  Amoxicillin TABS  2  Animal dander - Cats    Vitals   Vital Signs    Recorded: 18Sep2017 07:47AM   Temperature 96 5 F   Heart Rate 70   Respiration 20   Systolic 464   Diastolic 82   Height 6 ft    Weight 302 lb    BMI Calculated 40 96   BSA Calculated 2 54   O2 Saturation 98     Physical Exam        Constitutional      General appearance: Abnormal   overweight  Pulmonary      Respiratory effort: No increased work of breathing or signs of respiratory distress  Auscultation of lungs: Clear to auscultation  Cardiovascular      Auscultation of heart: Normal rate and rhythm, normal S1 and S2, without murmurs  Lymphatic      Palpation of lymph nodes in neck: No lymphadenopathy  Skin      Skin and subcutaneous tissue: Normal without rashes or lesions  Psychiatric      Orientation to person, place, and time: Normal        Mood and affect: Normal           Signatures    Electronically signed by :  ALAN Ford; Sep 18 2017  8:15AM EST                       (Author)     Electronically signed by : Emily Pham DO; Nov 10 2017  3:09PM EST                       (Author)     Electronically signed by : Sohan Warner MD; Jan 12 2018  3:04PM EST                       (Author)

## 2017-11-14 ENCOUNTER — GENERIC CONVERSION - ENCOUNTER (OUTPATIENT)
Dept: OTHER | Facility: OTHER | Age: 37
End: 2017-11-14

## 2017-11-15 ENCOUNTER — GENERIC CONVERSION - ENCOUNTER (OUTPATIENT)
Dept: OTHER | Facility: OTHER | Age: 37
End: 2017-11-15

## 2017-11-16 ENCOUNTER — ALLSCRIPTS OFFICE VISIT (OUTPATIENT)
Dept: OTHER | Facility: OTHER | Age: 37
End: 2017-11-16

## 2017-11-28 ENCOUNTER — GENERIC CONVERSION - ENCOUNTER (OUTPATIENT)
Dept: OTHER | Facility: OTHER | Age: 37
End: 2017-11-28

## 2017-12-18 ENCOUNTER — HOSPITAL ENCOUNTER (EMERGENCY)
Facility: HOSPITAL | Age: 37
Discharge: HOME | End: 2017-12-18
Attending: EMERGENCY MEDICINE | Admitting: EMERGENCY MEDICINE
Payer: COMMERCIAL

## 2017-12-18 ENCOUNTER — APPOINTMENT (EMERGENCY)
Dept: CT IMAGING | Facility: HOSPITAL | Age: 37
End: 2017-12-18
Payer: COMMERCIAL

## 2017-12-18 VITALS
RESPIRATION RATE: 17 BRPM | BODY MASS INDEX: 39.68 KG/M2 | WEIGHT: 293 LBS | DIASTOLIC BLOOD PRESSURE: 59 MMHG | HEART RATE: 61 BPM | HEIGHT: 72 IN | OXYGEN SATURATION: 96 % | SYSTOLIC BLOOD PRESSURE: 110 MMHG | TEMPERATURE: 98 F

## 2017-12-18 DIAGNOSIS — R20.8 OTHER DISTURBANCES OF SKIN SENSATION: ICD-10-CM

## 2017-12-18 DIAGNOSIS — R10.9 ABDOMINAL PAIN: Primary | ICD-10-CM

## 2017-12-18 LAB
ALBUMIN SERPL BCP-MCNC: 3.5 G/DL (ref 3.5–5)
ALP SERPL-CCNC: 75 U/L (ref 46–116)
ALT SERPL W P-5'-P-CCNC: 33 U/L (ref 12–78)
ANION GAP SERPL CALCULATED.3IONS-SCNC: 4 MMOL/L (ref 4–13)
AST SERPL W P-5'-P-CCNC: 16 U/L (ref 5–45)
BACTERIA UR QL AUTO: ABNORMAL /HPF
BASOPHILS # BLD AUTO: 0.02 THOUSANDS/ΜL (ref 0–0.1)
BASOPHILS NFR BLD AUTO: 0 % (ref 0–1)
BILIRUB SERPL-MCNC: 0.1 MG/DL (ref 0.2–1)
BILIRUB UR QL STRIP: NEGATIVE
BUN SERPL-MCNC: 12 MG/DL (ref 5–25)
CALCIUM SERPL-MCNC: 8.2 MG/DL (ref 8.3–10.1)
CHLORIDE SERPL-SCNC: 103 MMOL/L (ref 100–108)
CLARITY UR: NORMAL
CO2 SERPL-SCNC: 32 MMOL/L (ref 21–32)
COLOR UR: YELLOW
CREAT SERPL-MCNC: 0.75 MG/DL (ref 0.6–1.3)
EOSINOPHIL # BLD AUTO: 0.26 THOUSAND/ΜL (ref 0–0.61)
EOSINOPHIL NFR BLD AUTO: 4 % (ref 0–6)
ERYTHROCYTE [DISTWIDTH] IN BLOOD BY AUTOMATED COUNT: 12.5 % (ref 11.6–15.1)
EXT PREG TEST URINE: NEGATIVE
GFR SERPL CREATININE-BSD FRML MDRD: 103 ML/MIN/1.73SQ M
GLUCOSE SERPL-MCNC: 105 MG/DL (ref 65–140)
GLUCOSE UR STRIP-MCNC: NEGATIVE MG/DL
HCT VFR BLD AUTO: 36.7 % (ref 34.8–46.1)
HGB BLD-MCNC: 12.7 G/DL (ref 11.5–15.4)
HGB UR QL STRIP.AUTO: NORMAL
KETONES UR STRIP-MCNC: NEGATIVE MG/DL
LEUKOCYTE ESTERASE UR QL STRIP: NEGATIVE
LIPASE SERPL-CCNC: 150 U/L (ref 73–393)
LYMPHOCYTES # BLD AUTO: 2.61 THOUSANDS/ΜL (ref 0.6–4.47)
LYMPHOCYTES NFR BLD AUTO: 37 % (ref 14–44)
MAGNESIUM SERPL-MCNC: 1.9 MG/DL (ref 1.6–2.6)
MCH RBC QN AUTO: 29.7 PG (ref 26.8–34.3)
MCHC RBC AUTO-ENTMCNC: 34.6 G/DL (ref 31.4–37.4)
MCV RBC AUTO: 86 FL (ref 82–98)
MONOCYTES # BLD AUTO: 0.45 THOUSAND/ΜL (ref 0.17–1.22)
MONOCYTES NFR BLD AUTO: 6 % (ref 4–12)
MUCOUS THREADS UR QL AUTO: ABNORMAL
NEUTROPHILS # BLD AUTO: 3.81 THOUSANDS/ΜL (ref 1.85–7.62)
NEUTS SEG NFR BLD AUTO: 53 % (ref 43–75)
NITRITE UR QL STRIP: NEGATIVE
NON-SQ EPI CELLS URNS QL MICRO: ABNORMAL /HPF
PH UR STRIP.AUTO: 5.5 [PH] (ref 4.5–8)
PLATELET # BLD AUTO: 303 THOUSANDS/UL (ref 149–390)
PMV BLD AUTO: 9.5 FL (ref 8.9–12.7)
POTASSIUM SERPL-SCNC: 3.9 MMOL/L (ref 3.5–5.3)
PROT SERPL-MCNC: 7.2 G/DL (ref 6.4–8.2)
PROT UR STRIP-MCNC: NEGATIVE MG/DL
RBC # BLD AUTO: 4.27 MILLION/UL (ref 3.81–5.12)
RBC #/AREA URNS AUTO: ABNORMAL /HPF
SODIUM SERPL-SCNC: 139 MMOL/L (ref 136–145)
SP GR UR STRIP.AUTO: >=1.03 (ref 1–1.03)
UROBILINOGEN UR QL STRIP.AUTO: 0.2 E.U./DL
WBC # BLD AUTO: 7.15 THOUSAND/UL (ref 4.31–10.16)
WBC #/AREA URNS AUTO: ABNORMAL /HPF

## 2017-12-18 PROCEDURE — 36415 COLL VENOUS BLD VENIPUNCTURE: CPT | Performed by: EMERGENCY MEDICINE

## 2017-12-18 PROCEDURE — 85025 COMPLETE CBC W/AUTO DIFF WBC: CPT | Performed by: EMERGENCY MEDICINE

## 2017-12-18 PROCEDURE — 96375 TX/PRO/DX INJ NEW DRUG ADDON: CPT

## 2017-12-18 PROCEDURE — 81025 URINE PREGNANCY TEST: CPT | Performed by: EMERGENCY MEDICINE

## 2017-12-18 PROCEDURE — 80053 COMPREHEN METABOLIC PANEL: CPT | Performed by: EMERGENCY MEDICINE

## 2017-12-18 PROCEDURE — 83690 ASSAY OF LIPASE: CPT | Performed by: EMERGENCY MEDICINE

## 2017-12-18 PROCEDURE — 74177 CT ABD & PELVIS W/CONTRAST: CPT

## 2017-12-18 PROCEDURE — 96374 THER/PROPH/DIAG INJ IV PUSH: CPT

## 2017-12-18 PROCEDURE — 83735 ASSAY OF MAGNESIUM: CPT | Performed by: EMERGENCY MEDICINE

## 2017-12-18 PROCEDURE — 81001 URINALYSIS AUTO W/SCOPE: CPT | Performed by: EMERGENCY MEDICINE

## 2017-12-18 PROCEDURE — 99284 EMERGENCY DEPT VISIT MOD MDM: CPT

## 2017-12-18 PROCEDURE — 96361 HYDRATE IV INFUSION ADD-ON: CPT

## 2017-12-18 RX ORDER — ONDANSETRON 2 MG/ML
4 INJECTION INTRAMUSCULAR; INTRAVENOUS ONCE
Status: COMPLETED | OUTPATIENT
Start: 2017-12-18 | End: 2017-12-18

## 2017-12-18 RX ORDER — MORPHINE SULFATE 4 MG/ML
4 INJECTION, SOLUTION INTRAMUSCULAR; INTRAVENOUS ONCE
Status: COMPLETED | OUTPATIENT
Start: 2017-12-18 | End: 2017-12-18

## 2017-12-18 RX ADMIN — ONDANSETRON 4 MG: 2 INJECTION INTRAMUSCULAR; INTRAVENOUS at 18:40

## 2017-12-18 RX ADMIN — IOHEXOL 100 ML: 350 INJECTION, SOLUTION INTRAVENOUS at 19:28

## 2017-12-18 RX ADMIN — SODIUM CHLORIDE 1000 ML: 0.9 INJECTION, SOLUTION INTRAVENOUS at 18:45

## 2017-12-18 RX ADMIN — MORPHINE SULFATE 4 MG: 4 INJECTION, SOLUTION INTRAMUSCULAR; INTRAVENOUS at 18:40

## 2017-12-18 NOTE — ED PROVIDER NOTES
History  Chief Complaint   Patient presents with    Post-op Problem     had right fallopian tube removed 0n 17  started having pain in incision site approx  4 days ago  "feels sick" all over       History provided by:  Patient and medical records  Abdominal Pain   Pain location: Multiple sites in the area of her laparoscopic port sites  Pain quality comment:  Twisting/sharp pain  Pain radiates to:  Does not radiate  Pain severity:  Moderate  Onset quality:  Gradual  Duration:  4 days  Timing:  Intermittent  Progression:  Waxing and waning  Chronicity:  Recurrent (Patient with somewhat similar pain immediately post-op that resolved within 2-3 days)  Context: previous surgery ( and R-sided uterine tube removal 2017 at [place] [address])    Context: not recent illness, not recent sexual activity, not sick contacts, not suspicious food intake and not trauma    Relieved by:  Nothing  Worsened by: Movement and palpation  Ineffective treatments:  OTC medications (Took acetaminophen and left-over Percocet from previous Rx without improvement in sx)  Associated symptoms: fatigue, nausea and vomiting (1 episode nb/nb vomiting today)    Associated symptoms: no chest pain, no chills, no cough, no diarrhea, no dysuria, no fever, no hematuria, no melena, no shortness of breath and no vaginal bleeding    Risk factors: multiple surgeries, obesity and recent hospitalization (2017 [facility] (same day surgery))    Risk factors: no aspirin use, not elderly, no NSAID use and not pregnant      DDx includes but is not limited to:  Postoperative abscess, fistula, foreign body/granuloma reaction from suture material, pain related to adhesions  Plan labs/CT abdomen/pelvis with IV contrast   Disposition depending upon results of workup  Prior to Admission Medications   Prescriptions Last Dose Informant Patient Reported? Taking?    albuterol (PROVENTIL HFA,VENTOLIN HFA) 90 mcg/act inhaler No Yes   Si puffs every 3-4 hours with spacer as needed for wheeze, cough, short of breath  Patient taking differently: 2 puffs every 4 (four) hours as needed PT REPORTS SHE NO LONGER HAS ONE       Facility-Administered Medications: None       Past Medical History:   Diagnosis Date    Asthma        Past Surgical History:   Procedure Laterality Date     SECTION      CHOLECYSTECTOMY      DILATION AND CURETTAGE OF UTERUS      DILATION AND CURETTAGE OF UTERUS      OTHER SURGICAL HISTORY Right     right fallopian tube removed    [place] LAP,FULGURATE/EXCISE LESIONS Right 2017    Procedure: LAPAROSCOPIC OVARIAN CYSTECTOMY VERSUS OOPHERECTOMY;  Surgeon: Carlos Holcomb MD;  [facility];  Service: Gynecology    TONSILLECTOMY         History reviewed  No pertinent family history  I have reviewed and agree with the history as documented  Social History   Substance Use Topics    Smoking status: Former Smoker     Packs/day: 0 50     Types: Cigarettes    Smokeless tobacco: Never Used    Alcohol use Yes      Comment: rarely        Review of Systems   Constitutional: Positive for fatigue  Negative for chills and fever  Respiratory: Negative for cough and shortness of breath  Cardiovascular: Negative for chest pain and palpitations  Gastrointestinal: Positive for abdominal pain, nausea and vomiting (1 episode nb/nb vomiting today)  Negative for abdominal distention, diarrhea and melena  Genitourinary: Negative for difficulty urinating, dysuria, flank pain, frequency, hematuria and vaginal bleeding  Skin: Negative for color change, pallor, rash and wound  Neurological: Negative for dizziness, weakness, numbness and headaches  Hematological: Negative for adenopathy  Does not bruise/bleed easily  All other systems reviewed and are negative        Physical Exam  ED Triage Vitals [17 1745]   Temperature Pulse Respirations Blood Pressure SpO2   98 °F (36 7 °C) 66 18 123/64 99 %      Temp Source Heart Rate Source Patient Position - Orthostatic VS BP Location FiO2 (%)   Tympanic Monitor Sitting Left arm --      Pain Score       6           Orthostatic Vital Signs  Vitals:    12/18/17 1745 12/18/17 1900 12/18/17 1942 12/18/17 2015   BP: 123/64 111/56 117/65 110/59   Pulse: 66 60 65 61   Patient Position - Orthostatic VS: Sitting Lying Lying Lying       Physical Exam   Constitutional: She is oriented to person, place, and time  She appears well-developed and well-nourished  She is cooperative  No distress  HENT:   Head: Normocephalic and atraumatic  Right Ear: Hearing and external ear normal    Left Ear: Hearing and external ear normal    Nose: Nose normal    Mouth/Throat: Uvula is midline, oropharynx is clear and moist and mucous membranes are normal  No oropharyngeal exudate  Eyes: Conjunctivae, EOM and lids are normal  Pupils are equal, round, and reactive to light  Neck: Trachea normal, normal range of motion and phonation normal  Neck supple  No JVD present  No tracheal tenderness, no spinous process tenderness and no muscular tenderness present  No tracheal deviation present  No thyroid mass and no thyromegaly present  Cardiovascular: Normal rate, regular rhythm, S1 normal, S2 normal, normal heart sounds and intact distal pulses  Exam reveals no gallop and no friction rub  No murmur heard  Pulses:       Radial pulses are 2+ on the right side, and 2+ on the left side  Dorsalis pedis pulses are 2+ on the right side, and 2+ on the left side  Posterior tibial pulses are 2+ on the right side, and 2+ on the left side  Pulmonary/Chest: Effort normal and breath sounds normal  No stridor  No respiratory distress  She has no decreased breath sounds  She has no wheezes  She has no rhonchi  She has no rales  She exhibits no tenderness  Abdominal: Soft  She exhibits no distension (obese; nondistended) and no mass   There is tenderness (mild ttp in the mile-laparoscopic port sites)  There is no rigidity, no rebound, no guarding and no CVA tenderness  Musculoskeletal: Normal range of motion  She exhibits no edema, tenderness or deformity  Lymphadenopathy:     She has no cervical adenopathy  Neurological: She is alert and oriented to person, place, and time  She has normal strength  No cranial nerve deficit or sensory deficit  She exhibits normal muscle tone  GCS eye subscore is 4  GCS verbal subscore is 5  GCS motor subscore is 6  PERRLA; EOMI  Sensation intact to light touch over face in V1-V3 distribution bilaterally  Facial expressions symmetric  Tongue/uvula midline  Shoulder shrug equal bilaterally  Strength 5/5 in UE/LE bilaterally  Sensation intact to light touch in UE/LE bilaterally  Skin: Skin is warm, dry and intact  No rash noted  She is not diaphoretic  No erythema  Psychiatric: She has a normal mood and affect  Her speech is normal and behavior is normal    Nursing note and vitals reviewed        ED Medications  Medications   ondansetron (ZOFRAN) injection 4 mg (4 mg Intravenous Given 12/18/17 1840)   morphine (PF) 4 mg/mL injection 4 mg (4 mg Intravenous Given 12/18/17 1840)   sodium chloride 0 9 % bolus 1,000 mL (0 mL Intravenous Stopped 12/18/17 1945)   iohexol (OMNIPAQUE) 350 MG/ML injection (MULTI-DOSE) 100 mL (100 mL Intravenous Given 12/18/17 1928)       Diagnostic Studies  Results Reviewed     Procedure Component Value Units Date/Time    CMP [[id number]]  (Abnormal) Collected:  12/18/17 1831    Lab Status:  Final result Specimen:  Blood from Arm, Right Updated:  12/18/17 1856     Sodium 139 mmol/L      Potassium 3 9 mmol/L      Chloride 103 mmol/L      CO2 32 mmol/L      Anion Gap 4 mmol/L      BUN 12 mg/dL      Creatinine 0 75 mg/dL      Glucose 105 mg/dL      Calcium 8 2 (L) mg/dL      AST 16 U/L      ALT 33 U/L      Alkaline Phosphatase 75 U/L      Total Protein 7 2 g/dL      Albumin 3 5 g/dL      Total Bilirubin 0 10 (L) mg/dL eGFR 103 ml/min/1 73sq m     Narrative:         National Kidney Disease Education Program recommendations are as follows:  GFR calculation is accurate only with a steady state creatinine  Chronic Kidney disease less than 60 ml/min/1 73 sq  meters  Kidney failure less than 15 ml/min/1 73 sq  meters      Lipase [[id number]]  (Normal) Collected:  12/18/17 1831    Lab Status:  Final result Specimen:  Blood from Arm, Right Updated:  12/18/17 1856     Lipase 150 u/L     Magnesium [[id number]]  (Normal) Collected:  12/18/17 1831    Lab Status:  Final result Specimen:  Blood from Arm, Right Updated:  12/18/17 1856     Magnesium 1 9 mg/dL     POCT pregnancy, urine [[id number]]  (Normal) Resulted:  12/18/17 1854    Lab Status:  Final result Updated:  12/18/17 1855     EXT PREG TEST UR (Ref: Negative) Negative    Urine Microscopic [[id number]]  (Abnormal) Collected:  12/18/17 1831    Lab Status:  Final result Specimen:  Urine from Urine, Clean Catch Updated:  12/18/17 1849     RBC, UA 0-1 (A) /hpf      WBC, UA None Seen /hpf      Epithelial Cells Moderate (A) /hpf      Bacteria, UA Occasional /hpf      MUCOUS THREADS Moderate    UA w Reflex to Microscopic w Reflex to Culture [[id number]]  (Normal) Collected:  12/18/17 1831    Lab Status:  Final result Specimen:  Urine from Urine, Clean Catch Updated:  12/18/17 1840     Color, UA Yellow     Clarity, UA Slightly Cloudy     Specific Gravity, UA >=1 030     pH, UA 5 5     Leukocytes, UA Negative     Nitrite, UA Negative     Protein, UA Negative mg/dl      Glucose, UA Negative mg/dl      Ketones, UA Negative mg/dl      Urobilinogen, UA 0 2 E U /dl      Bilirubin, UA Negative     Blood, UA Trace-Intact    CBC and differential [[id number]]  (Normal) Collected:  12/18/17 1831    Lab Status:  Final result Specimen:  Blood from Arm, Right Updated:  12/18/17 1839     WBC 7 15 Thousand/uL      RBC 4 27 Million/uL      Hemoglobin 12 7 g/dL      Hematocrit 36 7 %      MCV 86 fL      MCH 29 7 pg MCHC 34 6 g/dL      RDW 12 5 %      MPV 9 5 fL      Platelets 008 Thousands/uL      Neutrophils Relative 53 %      Lymphocytes Relative 37 %      Monocytes Relative 6 %      Eosinophils Relative 4 %      Basophils Relative 0 %      Neutrophils Absolute 3 81 Thousands/µL      Lymphocytes Absolute 2 61 Thousands/µL      Monocytes Absolute 0 45 Thousand/µL      Eosinophils Absolute 0 26 Thousand/µL      Basophils Absolute 0 02 Thousands/µL                  CT abdomen pelvis with contrast   Final Result by Verna Cornejo MD (12/18 2001)      No definite abdominal wall collection or apparent incisional complication  No acute inflammatory changes in the abdomen or pelvis  Workstation performed: [id number]                    Procedures  Procedures       Phone Contacts  ED Phone Contact    ED Course  ED Course as of Dec 19 0736   Mon Dec 18, 2017   1857 1  WBC wnl   2  Hg/Hct wnl   3  Plt wnl   4  Electrolytes wnl  Transaminases wnl   5  Lipase wnl   6  UA resulted and wnl   7  UPT negative  8  Ct a/p pending at this time  1935 CT completed and awaiting interpretation at this time  1946 Care transferred to Dr Lori Chaves at this time  Patient case discussed including hx/exam features and diagnostic workup thus far  Pending ct interpretation and reevaluation            MDM  Number of Diagnoses or Management Options  Abdominal pain: new and does not require workup     Amount and/or Complexity of Data Reviewed  Clinical lab tests: ordered and reviewed  Tests in the radiology section of CPT®: ordered and reviewed  Decide to obtain previous medical records or to obtain history from someone other than the patient: yes  Review and summarize past medical records: yes  Discuss the patient with other providers: yes  Independent visualization of images, tracings, or specimens: yes    Risk of Complications, Morbidity, and/or Mortality  Presenting problems: high  Diagnostic procedures: high  Management options: moderate    Patient Progress  Patient progress: stable    CritCare Time    Disposition  Final diagnoses:   Abdominal pain     Time reflects when diagnosis was documented in both MDM as applicable and the Disposition within this note     Time User Action Codes Description Comment    12/18/2017  7:44 PM Carlos Coffey Add [R10 9] Abdominal pain       ED Disposition     ED Disposition Condition Comment    Discharge  Cheryl Sloop discharge to home/self care  Condition at discharge: Good        Follow-up Information    None       Discharge Medication List as of 12/18/2017  7:45 PM      CONTINUE these medications which have NOT CHANGED    Details   albuterol (PROVENTIL HFA,VENTOLIN HFA) 90 mcg/act inhaler 2 puffs every 3-4 hours with spacer as needed for wheeze, cough, short of breath , Print           No discharge procedures on file      ED Provider  Electronically Signed by           Angel Cardona DO  12/19/17 2239

## 2017-12-19 NOTE — DISCHARGE INSTRUCTIONS

## 2017-12-19 NOTE — ED PROVIDER NOTES
History  Chief Complaint   Patient presents with    Post-op Problem     had right fallopian tube removed 0n 17  started having pain in incision site approx  4 days ago  "feels sick" all over     HPI    Prior to Admission Medications   Prescriptions Last Dose Informant Patient Reported? Taking? albuterol (PROVENTIL HFA,VENTOLIN HFA) 90 mcg/act inhaler   No Yes   Si puffs every 3-4 hours with spacer as needed for wheeze, cough, short of breath  Patient taking differently: 2 puffs every 4 (four) hours as needed PT REPORTS SHE NO LONGER HAS ONE       Facility-Administered Medications: None       Past Medical History:   Diagnosis Date    Asthma        Past Surgical History:   Procedure Laterality Date     SECTION      CHOLECYSTECTOMY      DILATION AND CURETTAGE OF UTERUS      DILATION AND CURETTAGE OF UTERUS      OTHER SURGICAL HISTORY Right     right fallopian tube removed    [place] LAP,FULGURATE/EXCISE LESIONS Right 2017    Procedure: LAPAROSCOPIC OVARIAN CYSTECTOMY VERSUS OOPHERECTOMY;  Surgeon: Regla Mccray MD;  [facility];  Service: Gynecology    TONSILLECTOMY         History reviewed  No pertinent family history  I have reviewed and agree with the history as documented      Social History   Substance Use Topics    Smoking status: Former Smoker     Packs/day: 0 50     Types: Cigarettes    Smokeless tobacco: Never Used    Alcohol use Yes      Comment: rarely        Review of Systems    Physical Exam  ED Triage Vitals [17 1745]   Temperature Pulse Respirations Blood Pressure SpO2   98 °F (36 7 °C) 66 18 123/64 99 %      Temp Source Heart Rate Source Patient Position - Orthostatic VS BP Location FiO2 (%)   Tympanic Monitor Sitting Left arm --      Pain Score       6           Orthostatic Vital Signs  Vitals:    17 1745 17 1900 17 1942 17   BP: 123/64 111/56 117/65 110/59   Pulse: 66 60 65 61   Patient Position - Orthostatic VS: Sitting Lying Lying Lying       Physical Exam    ED Medications  Medications   ondansetron (ZOFRAN) injection 4 mg (4 mg Intravenous Given 12/18/17 1840)   morphine (PF) 4 mg/mL injection 4 mg (4 mg Intravenous Given 12/18/17 1840)   sodium chloride 0 9 % bolus 1,000 mL (0 mL Intravenous Stopped 12/18/17 1945)   iohexol (OMNIPAQUE) 350 MG/ML injection (MULTI-DOSE) 100 mL (100 mL Intravenous Given 12/18/17 1928)       Diagnostic Studies  Results Reviewed     Procedure Component Value Units Date/Time    CMP [[id number]]  (Abnormal) Collected:  12/18/17 1831    Lab Status:  Final result Specimen:  Blood from Arm, Right Updated:  12/18/17 1856     Sodium 139 mmol/L      Potassium 3 9 mmol/L      Chloride 103 mmol/L      CO2 32 mmol/L      Anion Gap 4 mmol/L      BUN 12 mg/dL      Creatinine 0 75 mg/dL      Glucose 105 mg/dL      Calcium 8 2 (L) mg/dL      AST 16 U/L      ALT 33 U/L      Alkaline Phosphatase 75 U/L      Total Protein 7 2 g/dL      Albumin 3 5 g/dL      Total Bilirubin 0 10 (L) mg/dL      eGFR 103 ml/min/1 73sq m     Narrative:         National Kidney Disease Education Program recommendations are as follows:  GFR calculation is accurate only with a steady state creatinine  Chronic Kidney disease less than 60 ml/min/1 73 sq  meters  Kidney failure less than 15 ml/min/1 73 sq  meters      Lipase [[id number]]  (Normal) Collected:  12/18/17 1831    Lab Status:  Final result Specimen:  Blood from Arm, Right Updated:  12/18/17 1856     Lipase 150 u/L     Magnesium [[id number]]  (Normal) Collected:  12/18/17 1831    Lab Status:  Final result Specimen:  Blood from Arm, Right Updated:  12/18/17 1856     Magnesium 1 9 mg/dL     POCT pregnancy, urine [[id number]]  (Normal) Resulted:  12/18/17 1854    Lab Status:  Final result Updated:  12/18/17 1855     EXT PREG TEST UR (Ref: Negative) Negative    Urine Microscopic [[id number]]  (Abnormal) Collected:  12/18/17 1831    Lab Status:  Final result Specimen:  Urine from Urine, Clean Catch Updated:  12/18/17 1849     RBC, UA 0-1 (A) /hpf      WBC, UA None Seen /hpf      Epithelial Cells Moderate (A) /hpf      Bacteria, UA Occasional /hpf      MUCOUS THREADS Moderate    UA w Reflex to Microscopic w Reflex to Culture [[id number]]  (Normal) Collected:  12/18/17 1831    Lab Status:  Final result Specimen:  Urine from Urine, Clean Catch Updated:  12/18/17 1840     Color, UA Yellow     Clarity, UA Slightly Cloudy     Specific Gravity, UA >=1 030     pH, UA 5 5     Leukocytes, UA Negative     Nitrite, UA Negative     Protein, UA Negative mg/dl      Glucose, UA Negative mg/dl      Ketones, UA Negative mg/dl      Urobilinogen, UA 0 2 E U /dl      Bilirubin, UA Negative     Blood, UA Trace-Intact    CBC and differential [[id number]]  (Normal) Collected:  12/18/17 1831    Lab Status:  Final result Specimen:  Blood from Arm, Right Updated:  12/18/17 1839     WBC 7 15 Thousand/uL      RBC 4 27 Million/uL      Hemoglobin 12 7 g/dL      Hematocrit 36 7 %      MCV 86 fL      MCH 29 7 pg      MCHC 34 6 g/dL      RDW 12 5 %      MPV 9 5 fL      Platelets 171 Thousands/uL      Neutrophils Relative 53 %      Lymphocytes Relative 37 %      Monocytes Relative 6 %      Eosinophils Relative 4 %      Basophils Relative 0 %      Neutrophils Absolute 3 81 Thousands/µL      Lymphocytes Absolute 2 61 Thousands/µL      Monocytes Absolute 0 45 Thousand/µL      Eosinophils Absolute 0 26 Thousand/µL      Basophils Absolute 0 02 Thousands/µL                  CT abdomen pelvis with contrast   Final Result by Keagan Dunham MD (12/18 2001)      No definite abdominal wall collection or apparent incisional complication  No acute inflammatory changes in the abdomen or pelvis           Workstation performed: [id number]                    Procedures  Procedures       Phone Contacts  ED Phone Contact    ED Course  ED Course                                MDM  Number of Diagnoses or Management Options  Diagnosis management comments: Patient with normal labs  Awaiting CT scan results  Patient be discharged seeming CT scan is negative  Patient only with pain at the sites of the surgical incision  Amount and/or Complexity of Data Reviewed  Clinical lab tests: reviewed      CritCare Time    Disposition  Final diagnoses:   Abdominal pain     Time reflects when diagnosis was documented in both MDM as applicable and the Disposition within this note     Time User Action Codes Description Comment    12/18/2017  7:44 PM Rima Flores Add [R10 9] Abdominal pain       ED Disposition     ED Disposition Condition Comment    [facility]  Condition at discharge: Good        Follow-up Information    None       Discharge Medication List as of 12/18/2017  7:45 PM      CONTINUE these medications which have NOT CHANGED    Details   albuterol (PROVENTIL HFA,VENTOLIN HFA) 90 mcg/act inhaler 2 puffs every 3-4 hours with spacer as needed for wheeze, cough, short of breath , Print           No discharge procedures on file      ED Provider  Electronically Signed by           Nessa Breaux DO  12/18/17 6970

## 2018-01-10 NOTE — MISCELLANEOUS
Message   Recorded as Task   Date: 09/25/2017 03:56 PM, Created By: Cliff Edgar   Task Name: Call Back   Assigned To: Sofya Nahun   Regarding Patient: Cassandra Marrero, Status: In Progress   Comment:    TraceykiannaParadiseBrittani - 25 Sep 2017 3:56 PM     TASK CREATED  Caller: Self; Results Inquiry; [phone] (Home)  Pt saw [address] [facility] 09/19 and is calling for test results  Ioana Lacey - 25 Sep 2017 3:58 PM     TASK IN PROGRESS   Ioana Lacey - 25 Sep 2017 4:04 PM     TASK EDITED  Spoke with pt - she is aware of the GC and Chlamydia and the vaginitis affirm results - wante the CAROL - advised those were sent to [facility] take a week for results to get back  Once we do, will notify pt of results  Active Problems    1  Calcaneal spur (726 73) (M77 30)   2  Depression screening (V79 0) (Z13 89)   3  Encounter for gynecological examination without abnormal finding (V72 31) (Z01 419)   4  Flu vaccine need (V04 81) (Z23)   5  Headache following lumbar puncture (349 0) (G97 1)   6  Hypothyroidism (244 9) (E03 9)   7  Mild intermittent asthma without complication (004 05) (D00 83)   8  Obesity (278 00) (E66 9)   9  Ovarian cyst (620 2) (N83 209)   10  Seasonal allergies (477 9) (J30 2)    Current Meds   1  Ventolin  (90 Base) MCG/ACT Inhalation Aerosol Solution; Therapy: [id number] to Recorded    Allergies    1  Amoxicillin TABS    2   Animal dander - Cats    Signatures   Electronically signed by : Joy Patel, ; Sep 25 2017  4:04PM EST                       (Author)

## 2018-01-10 NOTE — MISCELLANEOUS
Message   Recorded as Task   Date: 09/25/2017 03:56 PM, Created By: Margarita Dick   Task Name: Call Back   Assigned To: Dulce Resendiz   Regarding Patient: Tanika Barrientos, Status: In Progress   Comment:    Brittani Maki - 25 Sep 2017 3:56 PM     TASK CREATED  Caller: Self; Results Inquiry; [phone] (Home)  Pt saw [facility] 09/19 and is calling for test results  Ioana Lacey - 25 Sep 2017 3:58 PM     TASK IN PROGRESS   Ioana Lacey - 25 Sep 2017 4:04 PM     TASK EDITED  Spoke with pt - she is aware of the GC and Chlamydia and the vaginitis affirm results - wante the CAROL - advised those were sent [facility] take a week for results to get back  Once we do, will notify pt of results  Nohelia Laceysten - 25 Sep 2017 4:04 PM     TASK COMPLETED   [facility] 27 [facility] 2017 1:46 PM     TASK REACTIVATED   [facility] 27 Sep 2017 1:47 PM     TASK EDITED  pt is calling back to check on results she is @ [phone]  Ioana Lacey - 27 Sep 2017 1:47 PM     TASK IN PROGRESS   Ioana Lacey - 27 Sep 2017 2:00 PM     TASK EDITED  [employer] - waiting for the results to be faxed  Ioana Lacey - 27 Sep 2017 2:08 PM     TASK EDITED  Results were faxed and now scanned into the chart  Results tasked to provider for review  Celestino  Donna ASA 0695 8:38 AM     TASK Lynnette Bernalon - 28 Sep 2017 11:51 AM     TASK REACTIVATED   Dana March - 28 Sep 2017 11:52 AM     TASK EDITED  pt called again today for results - is anxious to see what is happening - please advise  Ioana Lacey - 28 Sep 2017 11:53 AM     TASK IN PROGRESS   Ioana Lacey - 28 Sep 2017 12:12 PM     TASK EDITED  Spoke with pt - she is aware of [place] results  She is still wanting to have the Laparoscopic cystectomy - and hoping to have the surgery this year  Still in a lot of pain     Park Reddy - 28 Sep 2017 2:35 PM     TASK REPLIED TO: Previously Assigned To Jordan Perez  can make her appointment with CT to discuss the possibility of surgery   Ioana Lacey - 28 Sep 2017 2:47 PM     TASK EDITED  Called pt  - made an appointment for her to see CT7 on Monday in [facility]  Active Problems    1  Calcaneal spur (726 73) (M77 30)   2  Depression screening (V79 0) (Z13 89)   3  Encounter for gynecological examination without abnormal finding (V72 31) (Z01 419)   4  Flu vaccine need (V04 81) (Z23)   5  Headache following lumbar puncture (349 0) (G97 1)   6  Hypothyroidism (244 9) (E03 9)   7  Mild intermittent asthma without complication (515 18) (S38 75)   8  Obesity (278 00) (E66 9)   9  Ovarian cyst (620 2) (N83 209)   10  Seasonal allergies (477 9) (J30 2)    Current Meds   1  Ventolin  (90 Base) MCG/ACT Inhalation Aerosol Solution; Therapy: [id number] to Recorded    Allergies    1  Amoxicillin TABS    2   Animal dander - Cats    Signatures   Electronically signed by : Darylene Cure, ; Sep 28 2017  2:48PM EST                       (Author)

## 2018-01-11 NOTE — MISCELLANEOUS
Message   Recorded as Task   Date: 09/25/2017 03:56 PM, Created By: Jem Kelly   Task Name: Call Back   Assigned To: Lloyd Marsh   Regarding Patient: Omer Mora, Status: In Progress   Comment:    Brittani Maki - 25 Sep 2017 3:56 PM     TASK CREATED  Caller: Self; Results Inquiry; [phone] (Home)  Pt saw [facility] 09/19 and is calling for test results  [place],Ioana - 25 Sep 2017 3:58 PM     TASK IN PROGRESS   AbhijitNohelia olsonsten - 25 Sep 2017 4:04 PM     TASK EDITED  Spoke with pt - she is aware of the GC and Chlamydia and the vaginitis affirm results - wante the CAROL - advised those were sent to [facility] take a week for results to get back  Once we do, will notify pt of results  [place] - 25 Sep 2017 4:04 PM     TASK COMPLETED   [facility] - 27 Sep 2017 1:46 PM     TASK REACTIVATED   [facility] - 27 Sep 2017 1:47 PM     TASK EDITED  pt is calling back to check on results she is @ [phone]  Abhijit,Ioana - 27 Sep 2017 1:47 PM     TASK IN PROGRESS   [place],Ioana - 27 Sep 2017 2:00 PM     TASK EDITED  [employer] - waiting for the results to be faxed  [place] - 27 Sep 2017 2:08 PM     TASK EDITED  Results were faxed and now scanned into the chart  Results tasked to provider for review  Active Problems    1  Calcaneal spur (726 73) (M77 30)   2  Depression screening (V79 0) (Z13 89)   3  Encounter for gynecological examination without abnormal finding (V72 31) (Z01 419)   4  Flu vaccine need (V04 81) (Z23)   5  Headache following lumbar puncture (349 0) (G97 1)   6  Hypothyroidism (244 9) (E03 9)   7  Mild intermittent asthma without complication (021 83) (Y62 56)   8  Obesity (278 00) (E66 9)   9  Ovarian cyst (620 2) (N83 209)   10  Seasonal allergies (477 9) (J30 2)    Current Meds   1  Ventolin  (90 Base) MCG/ACT Inhalation Aerosol Solution; Therapy: [id number] to Recorded    Allergies    1  Amoxicillin TABS    2   Animal dander - Cats    Signatures   Electronically signed by : Viral Chavez MA; Sep 28 2017  8:38AM EST                       (Author)

## 2018-01-14 VITALS
HEART RATE: 70 BPM | WEIGHT: 293 LBS | BODY MASS INDEX: 39.68 KG/M2 | OXYGEN SATURATION: 98 % | SYSTOLIC BLOOD PRESSURE: 114 MMHG | TEMPERATURE: 96.5 F | DIASTOLIC BLOOD PRESSURE: 82 MMHG | RESPIRATION RATE: 20 BRPM | HEIGHT: 72 IN

## 2018-01-15 ENCOUNTER — OFFICE VISIT (OUTPATIENT)
Dept: URGENT CARE | Facility: CLINIC | Age: 38
End: 2018-01-15
Payer: COMMERCIAL

## 2018-01-15 PROCEDURE — 99213 OFFICE O/P EST LOW 20 MIN: CPT

## 2018-01-15 NOTE — MISCELLANEOUS
Message  Return to work or school:   Gary Eubanks is under my professional care  She was seen in my office on 11/16/2017    Ralph Guillory will be out of work at this time - with a return to work date of 11/28/2017        Dr Angelina Desai MD       Signatures   Electronically signed by : BHASKAR Eubanks ; Nov 20 2017  9:46PM EST                       (Author)

## 2018-01-17 NOTE — MISCELLANEOUS
Message   Recorded as Task   Date: 11/15/2017 11:33 AM, Created By: Lalo Tyler   Task Name: Medical Complaint Callback   Assigned To: Isidro Brennan   Regarding Patient: Micah Holstein, Status: In Progress   Jose Armandotamar Short - 15 Nov 2017 11:33 AM     TASK CREATED  Caller: Self; Medical Complaint; [phone] (Home)  Pt had surgery w CT7 11/09 to remove fallopian tube  Pt states she is still bleeding and is unsure if this is normal   Pt states the bleeding is light, but she is concerned  Radha Peterson - 15 Nov 2017 11:38 AM     TASK IN PROGRESS   Radha Peterson - 15 Nov 2017 12:01 PM     TASK EDITED  Dear Dr Maricruz Robertson:    Good morning  Pt received Laparoscopic left cystectomy v  Oophorectomy procedure on 11/9/17  Pt states she has been having light bleeding, bright red in color since procedure was performed  Pt states she has sharp throbbing pain emanating from larger incision site  Pt rates pain at "5" on pain scale  Pt states she also has bruising at larger incision site  Pt's LMP was 10/16/17  Pt states she is not pregnant, not sexually active  Pt further states she had a low grade fever (99 7 degrees) last night, has been taking Ibuprofen  Pt states she is allergic to Penicillin and it's derivatives  Please advise pending your review of Pt's symptoms  Thank you and have a good day! Thuy Garnica, [address] Christina Booth - 15 Nov 2017 4:38 PM     TASK REPLIED TO: Previously Assigned To Christina Carpio  Ice packs to region? Have her come in tomorrow to see me  Maria R Webb - 15 Nov 2017 5:39 PM     TASK IN PROGRESS   Radha Peterson - 15 Nov 2017 5:47 PM     TASK EDITED  Spoke with Pt today via phone call  Pt informed that her symptoms were addressed with Dr Maricruz Robertson  Pt informed that Dr Maricruz Robertson wants to see Pt tomorrow at office visit  Office visit scheduled for Thursday at 10:20 am [facility])    Pt further informed that ice pack can be applied to painful region (incision site) as per Dr Taj Preston recommendation  Reiterated to Pt that if her symptoms worsen or she has any questions/concerns, to contact office  Active Problems    1  Calcaneal spur (726 73) (M77 30)   2  Depression screening (V79 0) (Z13 89)   3  Encounter for gynecological examination without abnormal finding (V72 31) (Z01 419)   4  Encounter for preoperative examination for general surgical procedure (V72 84)   (Z01 818)   5  Flu vaccine need (V04 81) (Z23)   6  Headache following lumbar puncture (349 0) (G97 1)   7  Hypothyroidism (244 9) (E03 9)   8  Mild intermittent asthma without complication (878 11) (Y21 30)   9  Obesity (278 00) (E66 9)   10  Ovarian cyst (620 2) (N83 209)   11  Seasonal allergies (477 9) (J30 2)    Current Meds   1  Ventolin  (90 Base) MCG/ACT Inhalation Aerosol Solution; Therapy: [id number] to Recorded    Allergies    1  Amoxicillin TABS    2   Animal dander - Cats    Signatures   Electronically signed by : Celia Galeas MA; Nov 15 2017  5:47PM EST                       (Author)

## 2018-01-22 VITALS
WEIGHT: 293 LBS | BODY MASS INDEX: 39.68 KG/M2 | SYSTOLIC BLOOD PRESSURE: 122 MMHG | HEIGHT: 72 IN | DIASTOLIC BLOOD PRESSURE: 82 MMHG

## 2018-01-22 VITALS
WEIGHT: 293 LBS | HEIGHT: 72 IN | BODY MASS INDEX: 39.68 KG/M2 | DIASTOLIC BLOOD PRESSURE: 80 MMHG | SYSTOLIC BLOOD PRESSURE: 110 MMHG

## 2018-01-22 VITALS
BODY MASS INDEX: 39.68 KG/M2 | DIASTOLIC BLOOD PRESSURE: 80 MMHG | SYSTOLIC BLOOD PRESSURE: 110 MMHG | WEIGHT: 293 LBS | HEIGHT: 72 IN

## 2018-01-22 VITALS — HEIGHT: 72 IN | SYSTOLIC BLOOD PRESSURE: 120 MMHG | DIASTOLIC BLOOD PRESSURE: 80 MMHG

## 2018-01-23 VITALS
BODY MASS INDEX: 39.68 KG/M2 | OXYGEN SATURATION: 97 % | SYSTOLIC BLOOD PRESSURE: 149 MMHG | HEART RATE: 74 BPM | DIASTOLIC BLOOD PRESSURE: 96 MMHG | HEIGHT: 72 IN | WEIGHT: 293 LBS | TEMPERATURE: 98.6 F | RESPIRATION RATE: 18 BRPM

## 2018-01-24 NOTE — PROGRESS NOTES
Assessment    1  Bacterial skin infection (016 9,041 9) (L08 9,B31 89)    Plan  Bacterial skin infection    · Sulfamethoxazole-Trimethoprim 800-160 MG Oral Tablet (Bactrim DS); TAKE 1  TABLET TWICE DAILY UNTIL FINISHED    Discussion/Summary  Discussion Summary:   Bactrim DS as directed  call or return for problems/concerns  Medication Side Effects Reviewed: Possible side effects of new medications were reviewed with the patient/guardian today  Understands and agrees with treatment plan: The treatment plan was reviewed with the patient/guardian  The patient/guardian understands and agrees with the treatment plan      Chief Complaint    1  Rash  Chief Complaint Free Text Note Form: C/o painful rash on right buttock x 2 days  History of Present Illness  HPI: C/o painful rash on right buttock x 4 days (since Friday)  reports pain/ not itching  never drained      Review of Systems  Focused-Female:   Constitutional: No fever, no chills, feels well, no tiredness, no recent weight gain or loss  ENT: no ear ache, no loss of hearing, no nosebleeds or nasal discharge, no sore throat or hoarseness  Cardiovascular: no complaints of slow or fast heart rate, no chest pain, no palpitations, no leg claudication or lower extremity edema  Respiratory: no complaints of shortness of breath, no wheezing, no dyspnea on exertion, no orthopnea or PND  Breasts: no complaints of breast pain, breast lump or nipple discharge  Gastrointestinal: no complaints of abdominal pain, no constipation, no nausea or diarrhea, no vomiting, no bloody stools  Genitourinary: no complaints of dysuria, no incontinence, no pelvic pain, no dysmenorrhea, no vaginal discharge or abnormal vaginal bleeding  Musculoskeletal: no complaints of arthralgia, no myalgia, no joint swelling or stiffness, no limb pain or swelling  Integumentary: rash, but no itching     Neurological: no complaints of headache, no confusion, no numbness or tingling, no dizziness or fainting  Active Problems    1  Calcaneal spur (726 73) (M77 30)   2  Depression screening (V79 0) (Z13 89)   3  Encounter for gynecological examination without abnormal finding (V72 31) (Z01 419)   4  Encounter for preoperative examination for general surgical procedure (V72 84)   (Z01 818)   5  Flu vaccine need (V04 81) (Z23)   6  Headache following lumbar puncture (349 0) (G97 1)   7  Hypothyroidism (244 9) (E03 9)   8  Incisional pain (782 0) (R20 8)   9  Mild intermittent asthma without complication (428 51) (E40 95)   10  Obesity (278 00) (E66 9)   11  Post-operative state (V45 89) (Z98 890)   12  Seasonal allergies (477 9) (J30 2)    Past Medical History    1  History of Acute left ankle pain (719 47) (M25 572)   2  History of Acute low back pain (724 2) (M54 5)   3  History of Contusion of foot, left (924 20) (S90 32XA)   4  History of Encounter for screening for osteoporosis (V82 81) (Z13 820)   5  History of Heel pain (729 5) (M79 673)   6  History of acute bronchitis (V12 69) (Z87 09)   7  History of asthma (V12 69) (Z87 09)   8  History of cough   9  History of dysuria (V13 00) (Z87 898)   10  History of gastroesophageal reflux (GERD) (V12 79) (Z87 19)   11  History of headache (V13 89) (Z87 898)   12  History of sinusitis (V12 69) (Z87 09)   13  History of Injury of left foot, initial encounter (959 7) (L77 974H)   14  History of Left ankle sprain (845 00) (S93 402A)   15  History of Left ankle tendonitis (727 06) (M77 52)   16  History of No known problems   17  History of Possible pregnancy (V72 40) (Z32 00)   18  History of Wrist pain, acute, right (719 43) (M25 531)   19  History of Wrist pain, chronic, left (473 15,797 23) (M25 532,G89 29)   20  History of Yeast infection (112 9) (B37 9)  Active Problems And Past Medical History Reviewed: The active problems and past medical history were reviewed and updated today  Family History  Mother    1   Family history of arthritis (V17 7) (Z82 61)   2  Family history of atrial fibrillation (V17 49) (Z82 49)   3  Family history of chronic obstructive pulmonary disease (V17 6) (Z82 5)   4  Family history of hypertension (V17 49) (Z82 49)   5  Family history of Urinary incontinence  Father    6  Family history of Anxiety and depression   7  Family history of arthritis (V17 7) (Z82 61)   8  Family history of diabetes mellitus (V18 0) (Z83 3)   9  Family history of hypertension (V17 49) (Z82 49)   10  Family history of urinary tract infection (V18 7) (Z84 2)   11  Family history of Heart problem   12  Family history of High cholesterol   13  Family history of Hydrocephalus  Daughter    15  Family history of migraine headaches (V17 2) (Z82 0)   15  Family history of neuroblastoma (V19 8) (Z84 89)   16  Family history of Jaundice  Brother    16  Family history of Hodgkin's lymphoma (V16 7) (Z80 7)   18  Family history of non-Hodgkin's lymphoma (V16 7) (Z80 7)  Maternal Aunt    19  Family history of malignant neoplasm of stomach (V16 0) (Z80 0)  Maternal Uncle    21  Family history of Mesothelioma  Family History Reviewed: The family history was reviewed and updated today  Social History    · Denied: History of Drug use   · Never a smoker   · Social alcohol use (Z78 9)  Social History Reviewed: The social history was reviewed and updated today  Surgical History    1  History of  Section   2  History of Cholecystectomy   3  History of Tonsillectomy  Surgical History Reviewed: The surgical history was reviewed and updated today  Current Meds   1  Oxycodone-Acetaminophen 5-325 MG Oral Tablet; TAKE 1 TO 2 TABLETS EVERY 4   HOURS AS NEEDED FOR PAIN;   Therapy: 16GIT2122 to (Evaluate:2017); Last Rx:2017 Ordered   2  Ventolin  (90 Base) MCG/ACT Inhalation Aerosol Solution; Therapy: 39Mcz0751 to Recorded  Medication List Reviewed: The medication list was reviewed and updated today  Allergies    1  Amoxicillin TABS    2  Animal dander - Cats    Vitals  Signs   Recorded: 89FMQ3948 07:44PM   Temperature: 98 6 F  Heart Rate: 74  Respiration: 18  Systolic: 788  Diastolic: 96  Height: 6 ft   Weight: 309 lb   BMI Calculated: 41 91  BSA Calculated: 2 56  O2 Saturation: 97    Physical Exam    Constitutional   General appearance: No acute distress, well appearing and well nourished  Pulmonary   Respiratory effort: No increased work of breathing or signs of respiratory distress  Auscultation of lungs: Clear to auscultation  Cardiovascular   Auscultation of heart: Normal rate and rhythm, normal S1 and S2, without murmurs  Musculoskeletal   Gait and station: Normal     Skin   Skin and subcutaneous tissue: Abnormal   2inch diameter on top of right buttock, no indurance, no fluctuance  painful with palp  redness noted, warmth noted  central scabbing noted, no vesicles     Psychiatric   Orientation to person, place, and time: Normal     Mood and affect: Normal        Signatures   Electronically signed by : Marino Rocha; Garret 15 2018  7:47PM EST                       (Author)    Electronically signed by : WILBER Arrieta ; Jan 16 2018 10:49AM EST                       (Co-author)

## 2018-01-31 DIAGNOSIS — G56.03 BILATERAL CARPAL TUNNEL SYNDROME: Primary | ICD-10-CM

## 2018-02-13 ENCOUNTER — TELEPHONE (OUTPATIENT)
Dept: OBGYN CLINIC | Facility: CLINIC | Age: 38
End: 2018-02-13

## 2018-02-13 DIAGNOSIS — N91.2 AMENORRHEA: Primary | ICD-10-CM

## 2018-02-13 NOTE — TELEPHONE ENCOUNTER
Pt states no cycle since lap in oct    Did have 2 days of blood on wiping 2 weeks ago     C/o hot flashes, and wants to discuss possibility of seeing reproductive endocrinologist  Awaits further instructions

## 2018-02-13 NOTE — TELEPHONE ENCOUNTER
Pt called in regards to missed period   Pt had surgery in November with talmage no cycle since October please advise

## 2018-02-14 ENCOUNTER — TRANSCRIBE ORDERS (OUTPATIENT)
Dept: ADMINISTRATIVE | Facility: HOSPITAL | Age: 38
End: 2018-02-14

## 2018-02-14 NOTE — TELEPHONE ENCOUNTER
I would have her obtain an FSH/estradiol - and then depending on results can have her see smith v  Me   If normal, will likely need to induce menses with provera

## 2018-02-14 NOTE — TELEPHONE ENCOUNTER
Spoke with pt - advised her per Dr Rebekah Juarez, she would like the pt to go for some blood work  The orders are available in Epic  She will go to a [facility]'s facility  Advised once we have the results, we can go from there

## 2018-02-15 ENCOUNTER — APPOINTMENT (OUTPATIENT)
Dept: LAB | Facility: CLINIC | Age: 38
End: 2018-02-15
Payer: COMMERCIAL

## 2018-02-15 DIAGNOSIS — N91.2 AMENORRHEA: ICD-10-CM

## 2018-02-15 LAB
ESTRADIOL SERPL-MCNC: 37 PG/ML
FSH SERPL-ACNC: 5.8 MIU/ML

## 2018-02-15 PROCEDURE — 83001 ASSAY OF GONADOTROPIN (FSH): CPT

## 2018-02-15 PROCEDURE — 36415 COLL VENOUS BLD VENIPUNCTURE: CPT

## 2018-02-15 PROCEDURE — 82670 ASSAY OF TOTAL ESTRADIOL: CPT

## 2018-02-17 ENCOUNTER — OFFICE VISIT (OUTPATIENT)
Dept: URGENT CARE | Facility: CLINIC | Age: 38
End: 2018-02-17
Payer: COMMERCIAL

## 2018-02-17 VITALS
RESPIRATION RATE: 20 BRPM | SYSTOLIC BLOOD PRESSURE: 138 MMHG | DIASTOLIC BLOOD PRESSURE: 97 MMHG | OXYGEN SATURATION: 97 % | TEMPERATURE: 99 F | HEART RATE: 83 BPM

## 2018-02-17 DIAGNOSIS — J01.10 ACUTE FRONTAL SINUSITIS, RECURRENCE NOT SPECIFIED: Primary | ICD-10-CM

## 2018-02-17 PROCEDURE — 99213 OFFICE O/P EST LOW 20 MIN: CPT

## 2018-02-17 RX ORDER — SULFAMETHOXAZOLE AND TRIMETHOPRIM 800; 160 MG/1; MG/1
1 TABLET ORAL EVERY 12 HOURS SCHEDULED
Qty: 14 TABLET | Refills: 0 | Status: SHIPPED | OUTPATIENT
Start: 2018-02-17 | End: 2018-02-24

## 2018-02-18 NOTE — PROGRESS NOTES
Assessment/Plan:    No problem-specific Assessment & Plan notes found for this encounter  Diagnoses and all orders for this visit:    Acute frontal sinusitis, recurrence not specified  -     sulfamethoxazole-trimethoprim (BACTRIM DS) 800-160 mg per tablet; Take 1 tablet by mouth every 12 (twelve) hours for 7 days          Subjective:      Patient ID: Kathia Zuniga is a 40 y o  female  79-year-old female in urgent care with chief complaint of nasal congestion cough postnasal drainage for the past 4 days denies any fevers chills has not used any over-the-counter medications reports no improvement in symptoms        The following portions of the patient's history were reviewed and updated as appropriate:   She  has a past medical history of Asthma  She  does not have any pertinent problems on file  She  has a past surgical history that includes Tonsillectomy; Cholecystectomy; Dilation and curettage of uterus; Dilation and curettage of uterus;  section; pr lap,fulgurate/excise lesions (Right, 2017); and Other surgical history (Right)  Her family history includes Anxiety disorder in her father; Arthritis in her father and mother; Atrial fibrillation in her mother; COPD in her mother; Depression in her father; Diabetes in her father; Heart disease in her father; Hodgkin's lymphoma in her brother; Hydrocephalus in her father; Hyperlipidemia in her father; Hypertension in her father and mother; Migraines in her daughter; Stomach cancer in her maternal aunt  She  reports that she has quit smoking  Her smoking use included Cigarettes  She smoked 0 50 packs per day  She has never used smokeless tobacco  She reports that she drinks alcohol  She reports that she does not use drugs  Current Outpatient Prescriptions   Medication Sig Dispense Refill    albuterol (PROVENTIL HFA,VENTOLIN HFA) 90 mcg/act inhaler 2 puffs every 3-4 hours with spacer as needed for wheeze, cough, short of breath   (Patient taking differently: 2 puffs every 4 (four) hours as needed PT REPORTS SHE NO LONGER HAS ONE ) 1 Inhaler 0    sulfamethoxazole-trimethoprim (BACTRIM DS) 800-160 mg per tablet Take 1 tablet by mouth every 12 (twelve) hours for 7 days 14 tablet 0     No current facility-administered medications for this visit  Current Outpatient Prescriptions on File Prior to Visit   Medication Sig    albuterol (PROVENTIL HFA,VENTOLIN HFA) 90 mcg/act inhaler 2 puffs every 3-4 hours with spacer as needed for wheeze, cough, short of breath  (Patient taking differently: 2 puffs every 4 (four) hours as needed PT REPORTS SHE NO LONGER HAS ONE )     No current facility-administered medications on file prior to visit  She is allergic to amoxicillin       Review of Systems   Constitutional: Negative  HENT: Positive for congestion, postnasal drip and sinus pressure  Eyes: Negative  Respiratory: Positive for cough  Cardiovascular: Negative  Gastrointestinal: Negative  Endocrine: Negative  Genitourinary: Negative  Musculoskeletal: Negative  Skin: Negative  Allergic/Immunologic: Negative  Neurological: Negative  Hematological: Negative  Psychiatric/Behavioral: Negative  Objective:      /97   Pulse 83   Temp 99 °F (37 2 °C)   Resp 20   SpO2 97%          Physical Exam   Constitutional: She is oriented to person, place, and time  Vital signs are normal  She appears well-developed and well-nourished  HENT:   Head: Normocephalic and atraumatic  Right Ear: Hearing, tympanic membrane, external ear and ear canal normal    Left Ear: Hearing, tympanic membrane, external ear and ear canal normal    Nose: Rhinorrhea and sinus tenderness present  Right sinus exhibits frontal sinus tenderness  Left sinus exhibits frontal sinus tenderness  Mouth/Throat: Uvula is midline and mucous membranes are normal  Posterior oropharyngeal erythema present     Eyes: Conjunctivae and EOM are normal  Pupils are equal, round, and reactive to light  Neck: Trachea normal, normal range of motion and full passive range of motion without pain  Cardiovascular: Normal rate and regular rhythm  Pulmonary/Chest: Effort normal and breath sounds normal    Abdominal: Soft  Normal appearance  Musculoskeletal: Normal range of motion  Lymphadenopathy:     She has cervical adenopathy  Right cervical: Superficial cervical adenopathy present  Left cervical: Superficial cervical adenopathy present  Neurological: She is alert and oriented to person, place, and time  Skin: Skin is warm and dry  Psychiatric: She has a normal mood and affect   Her behavior is normal  Judgment and thought content normal

## 2018-02-22 ENCOUNTER — OFFICE VISIT (OUTPATIENT)
Dept: OBGYN CLINIC | Facility: CLINIC | Age: 38
End: 2018-02-22
Payer: COMMERCIAL

## 2018-02-22 VITALS
HEART RATE: 68 BPM | BODY MASS INDEX: 39.68 KG/M2 | WEIGHT: 293 LBS | SYSTOLIC BLOOD PRESSURE: 128 MMHG | DIASTOLIC BLOOD PRESSURE: 70 MMHG | HEIGHT: 72 IN

## 2018-02-22 DIAGNOSIS — G56.01 RIGHT CARPAL TUNNEL SYNDROME: ICD-10-CM

## 2018-02-22 DIAGNOSIS — G56.02 CARPAL TUNNEL SYNDROME ON LEFT: Primary | ICD-10-CM

## 2018-02-22 PROCEDURE — 99204 OFFICE O/P NEW MOD 45 MIN: CPT | Performed by: ORTHOPAEDIC SURGERY

## 2018-02-22 NOTE — LETTER
February 22, 2018     Patient: Nancy Karimi   YOB: 1980   Date of Visit: 2/22/2018       To Whom it May Concern:    Nancy Karimi is under my professional care  She was seen in my office on 2/22/2018  She may return to work the day after surgery  If you have any questions or concerns, please don't hesitate to call           Sincerely,          Malick Desouza MD        CC: No Recipients

## 2018-02-22 NOTE — ASSESSMENT & PLAN NOTE
Findings and treatment options were discussed with the patient  Patient continues to have significant symptoms despite use of wrist brace at night  She would like to proceed with a right carpal tunnel release surgery  Risks, benefits and complications of surgery were discussed in detail by Dr Louie King and informed consent was obtained  All questions were answered to patient's satisfaction

## 2018-02-22 NOTE — ASSESSMENT & PLAN NOTE
Patient will continue use of wrist brace at night since symptoms are not as severe as the right  Discussed possible carpal tunnel release surgery in the future if symptoms worsen

## 2018-02-22 NOTE — H&P
Assessment:     1  Carpal tunnel syndrome on left    2  Right carpal tunnel syndrome        Plan:     Problem List Items Addressed This Visit        Nervous and Auditory    Right carpal tunnel syndrome     Findings and treatment options were discussed with the patient  Patient continues to have significant symptoms despite use of wrist brace at night  She would like to proceed with a right carpal tunnel release surgery  Risks, benefits and complications of surgery were discussed in detail by Dr Dustin Carlin and informed consent was obtained  All questions were answered to patient's satisfaction  Relevant Orders    Case request operating room: RELEASE CARPAL TUNNEL (Completed)    Carpal tunnel syndrome on left - Primary     Patient will continue use of wrist brace at night since symptoms are not as severe as the right  Discussed possible carpal tunnel release surgery in the future if symptoms worsen  Subjective:     Patient ID: Joseph Pelayo is a 40 y o  female  Chief Complaint: This is a 45-year-old female complaining of pain, numbness and tingling in the fingers of her bilateral hands for a little over a year  The symptoms have progressively been getting worse  The right side is worse than the left as well  The symptoms are worse with writing and when lying down at night  She has been using bilateral wrist braces for the past several months with minimal relief  She feels the symptoms in all of her fingers except her small fingers  She had an EMG done in March of 2017 that showed bilateral moderate to severe carpal tunnel syndrome  She has had her thyroid tested and it was normal  She is interested in discussing surgery at this point        Allergy:  Allergies   Allergen Reactions    Amoxicillin Rash     Medications:  all current active meds have been reviewed  Past Medical History:  Past Medical History:   Diagnosis Date    Asthma      Past Surgical History:  Past Surgical History: Procedure Laterality Date     SECTION      CHOLECYSTECTOMY      DILATION AND CURETTAGE OF UTERUS      DILATION AND CURETTAGE OF UTERUS      OTHER SURGICAL HISTORY Right     right fallopian tube removed    CT LAP,FULGURATE/EXCISE LESIONS Right 2017    Procedure: LAPAROSCOPIC OVARIAN CYSTECTOMY VERSUS OOPHERECTOMY;  Surgeon: Nixon Joseph MD;  [facility];  Service: Gynecology    TONSILLECTOMY       Family History:  Family History   Problem Relation Age of Onset    Arthritis Mother     Atrial fibrillation Mother     COPD Mother     Hypertension Mother     Depression Father     Anxiety disorder Father     Arthritis Father     Diabetes Father     Hypertension Father     Heart disease Father     Hyperlipidemia Father     Hydrocephalus Father     Hodgkin's lymphoma Brother     Stomach cancer Maternal Aunt     Migraines Daughter      Social History:  History   Alcohol Use    Yes     Comment: rarely     History   Drug Use No     History   Smoking Status    Former Smoker    Packs/day: 0 50    Types: Cigarettes   Smokeless Tobacco    Never Used     Review of Systems   HENT: Negative  Eyes: Negative  Respiratory: Negative  Cardiovascular: Negative  Gastrointestinal: Negative  Endocrine: Negative  Genitourinary: Negative  Musculoskeletal: Positive for arthralgias  Skin: Negative  Allergic/Immunologic: Negative  Neurological: Positive for weakness and numbness  Hematological: Negative  Psychiatric/Behavioral: Negative  Objective:  BP Readings from Last 1 Encounters:   18 128/70      Wt Readings from Last 1 Encounters:   18 136 kg (300 lb)      BMI:   Estimated body mass index is 40 69 kg/m² as calculated from the following:    Height as of this encounter: 6' (1 829 m)  Weight as of this encounter: 136 kg (300 lb)    BSA:   Estimated body surface area is 2 53 meters squared as calculated from the following:    Height as of this encounter: 6' (1 829 m)  Weight as of this encounter: 136 kg (300 lb)  Physical Exam   Constitutional: She is oriented to person, place, and time  She appears well-developed  HENT:   Head: Normocephalic and atraumatic  Eyes: EOM are normal  Pupils are equal, round, and reactive to light  Neck: Neck supple  Cardiovascular: Normal heart sounds and intact distal pulses  Pulmonary/Chest: Effort normal and breath sounds normal    Musculoskeletal: She exhibits no tenderness  Neurological: She is alert and oriented to person, place, and time  Skin: Skin is warm  Psychiatric: She has a normal mood and affect  Nursing note and vitals reviewed  Right Hand Exam     Tenderness   The patient is experiencing no tenderness  Range of Motion   The patient has normal right wrist ROM  Muscle Strength   Wrist Extension: 5/5   Wrist Flexion: 5/5   : 4/5     Tests   Phalens Sign: positive  Tinels Sign (Medial Nerve): positive  Finkelstein: negative    Other   Erythema: absent  Scars: absent  Right hand sensation: Decreased over median nerve distribution  Pulse: present      Left Hand Exam     Tenderness   The patient is experiencing no tenderness  Range of Motion   The patient has normal left wrist ROM  Muscle Strength   Wrist Extension: 5/5   Wrist Flexion: 5/5   :  5/5     Tests   Phalens Sign: positive  Tinels Sign (Medial Nerve): negative  Finkelstein: negative    Other   Erythema: absent  Scars: absent  Sensation: normal  Pulse: present              EMG of the bilateral upper extremities reveal moderate to severe carpal tunnel syndrome, right worse than left

## 2018-03-08 NOTE — PRE-PROCEDURE INSTRUCTIONS
Pre-Surgery Instructions:   Medication Instructions    albuterol (PROVENTIL HFA,VENTOLIN HFA) 90 mcg/act inhaler Patient was instructed by Physician and understands  Pre shower with hibiclens as instructed by surgeon  You may drive yourself to the hospital   You may take your medications day of surgery  You may eat on the day of your surgery  You may have a dressing, please wear something that will fit over it

## 2018-03-14 ENCOUNTER — HOSPITAL ENCOUNTER (OUTPATIENT)
Facility: HOSPITAL | Age: 38
Setting detail: OUTPATIENT SURGERY
Discharge: HOME | End: 2018-03-14
Attending: ORTHOPAEDIC SURGERY | Admitting: ORTHOPAEDIC SURGERY
Payer: COMMERCIAL

## 2018-03-14 VITALS
RESPIRATION RATE: 20 BRPM | HEART RATE: 65 BPM | DIASTOLIC BLOOD PRESSURE: 66 MMHG | HEIGHT: 72 IN | WEIGHT: 293 LBS | BODY MASS INDEX: 39.68 KG/M2 | SYSTOLIC BLOOD PRESSURE: 119 MMHG | OXYGEN SATURATION: 96 % | TEMPERATURE: 98.7 F

## 2018-03-14 DIAGNOSIS — G56.01 RIGHT CARPAL TUNNEL SYNDROME: Primary | ICD-10-CM

## 2018-03-14 PROCEDURE — 64721 CARPAL TUNNEL SURGERY: CPT | Performed by: ORTHOPAEDIC SURGERY

## 2018-03-14 RX ORDER — HYDROCODONE BITARTRATE AND ACETAMINOPHEN 5; 325 MG/1; MG/1
1 TABLET ORAL EVERY 6 HOURS PRN
Status: DISCONTINUED | OUTPATIENT
Start: 2018-03-14 | End: 2018-03-14 | Stop reason: HOSPADM

## 2018-03-14 RX ORDER — BUPIVACAINE HYDROCHLORIDE 5 MG/ML
10 INJECTION, SOLUTION PERINEURAL ONCE
Status: CANCELLED | OUTPATIENT
Start: 2018-03-14 | End: 2018-03-14

## 2018-03-14 RX ORDER — LIDOCAINE HYDROCHLORIDE 10 MG/ML
10 INJECTION, SOLUTION EPIDURAL; INFILTRATION; INTRACAUDAL; PERINEURAL ONCE
Status: CANCELLED | OUTPATIENT
Start: 2018-03-14 | End: 2018-03-14

## 2018-03-14 RX ORDER — HYDROCODONE BITARTRATE AND ACETAMINOPHEN 5; 325 MG/1; MG/1
1 TABLET ORAL EVERY 6 HOURS PRN
Qty: 8 TABLET | Refills: 0 | Status: SHIPPED | OUTPATIENT
Start: 2018-03-14 | End: 2018-03-24

## 2018-03-14 NOTE — DISCHARGE SUMMARY
[facility] Discharge Note  Dana Humphreys, 40 y o  female  MRN: [id number]      Preoperative diagnosis: R CTS    Postoperative diagnosis: same    Procedure: R CTR    After procedure, patient was brought to PACU  Pain was controlled with IV and oral pain medication  Patient was discharged to home after cleared by anesthesia and when criteria was met  Condition: good    Discharge medications:   See after visit summary for reconciled discharge medications provided to patient and family  Please refer to discharge instructions for further information

## 2018-03-14 NOTE — DISCHARGE INSTRUCTIONS
-Leave dressings on and dry until follow-up    -Gentle range of motion of the fingers is encouraged  Work on making a complete fist, straightening and stretching the fingers out as far as possible every 1-2 hours  You may do this by using the muscles themselves, or even using the other hand to assist in the stretching     -Elevate the hand and ice it for pain and swelling    -You should use over-the-counter pain medications such as Tylenol, Ibuprofen, Advil, and Aleve for pain control  This should give you good pain control  If this is not enough you may add the prescribed pain medication     -Avoid any heavy lifting, pushing, or pulling for the 1st 3-4 weeks after surgery until the incision is completely healed

## 2018-03-14 NOTE — H&P (VIEW-ONLY)
Assessment:     1  Carpal tunnel syndrome on left    2  Right carpal tunnel syndrome        Plan:     Problem List Items Addressed This Visit        Nervous and Auditory    Right carpal tunnel syndrome     Findings and treatment options were discussed with the patient  Patient continues to have significant symptoms despite use of wrist brace at night  She would like to proceed with a right carpal tunnel release surgery  Risks, benefits and complications of surgery were discussed in detail by Dr Tonny Kennedy and informed consent was obtained  All questions were answered to patient's satisfaction  Relevant Orders    Case request operating room: RELEASE CARPAL TUNNEL (Completed)    Carpal tunnel syndrome on left - Primary     Patient will continue use of wrist brace at night since symptoms are not as severe as the right  Discussed possible carpal tunnel release surgery in the future if symptoms worsen  Subjective:     Patient ID: Adela Diaz is a 40 y o  female  Chief Complaint: This is a 60-year-old female complaining of pain, numbness and tingling in the fingers of her bilateral hands for a little over a year  The symptoms have progressively been getting worse  The right side is worse than the left as well  The symptoms are worse with writing and when lying down at night  She has been using bilateral wrist braces for the past several months with minimal relief  She feels the symptoms in all of her fingers except her small fingers  She had an EMG done in March of 2017 that showed bilateral moderate to severe carpal tunnel syndrome  She has had her thyroid tested and it was normal  She is interested in discussing surgery at this point        Allergy:  Allergies   Allergen Reactions    Amoxicillin Rash     Medications:  all current active meds have been reviewed  Past Medical History:  Past Medical History:   Diagnosis Date    Asthma      Past Surgical History:  Past Surgical History: Procedure Laterality Date     SECTION      CHOLECYSTECTOMY      DILATION AND CURETTAGE OF UTERUS      DILATION AND CURETTAGE OF UTERUS      OTHER SURGICAL HISTORY Right     right fallopian tube removed    [place] LAP,FULGURATE/EXCISE LESIONS Right 2017    Procedure: LAPAROSCOPIC OVARIAN CYSTECTOMY VERSUS OOPHERECTOMY;  Surgeon: Gloria Gomez MD;  [facility];  Service: Gynecology    TONSILLECTOMY       Family History:  Family History   Problem Relation Age of Onset    Arthritis Mother     Atrial fibrillation Mother     COPD Mother     Hypertension Mother     Depression Father     Anxiety disorder Father     Arthritis Father     Diabetes Father     Hypertension Father     Heart disease Father     Hyperlipidemia Father     Hydrocephalus Father     Hodgkin's lymphoma Brother     Stomach cancer Maternal Aunt     Migraines Daughter      Social History:  History   Alcohol Use    Yes     Comment: rarely     History   Drug Use No     History   Smoking Status    Former Smoker    Packs/day: 0 50    Types: Cigarettes   Smokeless Tobacco    Never Used     Review of Systems   HENT: Negative  Eyes: Negative  Respiratory: Negative  Cardiovascular: Negative  Gastrointestinal: Negative  Endocrine: Negative  Genitourinary: Negative  Musculoskeletal: Positive for arthralgias  Skin: Negative  Allergic/Immunologic: Negative  Neurological: Positive for weakness and numbness  Hematological: Negative  Psychiatric/Behavioral: Negative  Objective:  BP Readings from Last 1 Encounters:   18 128/70      Wt Readings from Last 1 Encounters:   18 136 kg (300 lb)      BMI:   Estimated body mass index is 40 69 kg/m² as calculated from the following:    Height as of this encounter: 6' (1 829 m)  Weight as of this encounter: 136 kg (300 lb)    BSA:   Estimated body surface area is 2 53 meters squared as calculated from the following:    Height as of this encounter: 6' (1 829 m)  Weight as of this encounter: 136 kg (300 lb)  Physical Exam   Constitutional: She is oriented to person, place, and time  She appears well-developed  HENT:   Head: Normocephalic and atraumatic  Eyes: EOM are normal  Pupils are equal, round, and reactive to light  Neck: Neck supple  Cardiovascular: Normal heart sounds and intact distal pulses  Pulmonary/Chest: Effort normal and breath sounds normal    Musculoskeletal: She exhibits no tenderness  Neurological: She is alert and oriented to person, place, and time  Skin: Skin is warm  Psychiatric: She has a normal mood and affect  Nursing note and vitals reviewed  Right Hand Exam     Tenderness   The patient is experiencing no tenderness  Range of Motion   The patient has normal right wrist ROM  Muscle Strength   Wrist Extension: 5/5   Wrist Flexion: 5/5   : 4/5     Tests   Phalens Sign: positive  Tinels Sign (Medial Nerve): positive  Finkelstein: negative    Other   Erythema: absent  Scars: absent  Right hand sensation: Decreased over median nerve distribution  Pulse: present      Left Hand Exam     Tenderness   The patient is experiencing no tenderness  Range of Motion   The patient has normal left wrist ROM  Muscle Strength   Wrist Extension: 5/5   Wrist Flexion: 5/5   :  5/5     Tests   Phalens Sign: positive  Tinels Sign (Medial Nerve): negative  Finkelstein: negative    Other   Erythema: absent  Scars: absent  Sensation: normal  Pulse: present              EMG of the bilateral upper extremities reveal moderate to severe carpal tunnel syndrome, right worse than left

## 2018-03-18 ENCOUNTER — OFFICE VISIT (OUTPATIENT)
Dept: URGENT CARE | Facility: CLINIC | Age: 38
End: 2018-03-18
Payer: COMMERCIAL

## 2018-03-18 VITALS
DIASTOLIC BLOOD PRESSURE: 62 MMHG | RESPIRATION RATE: 18 BRPM | BODY MASS INDEX: 39.68 KG/M2 | HEIGHT: 72 IN | HEART RATE: 78 BPM | SYSTOLIC BLOOD PRESSURE: 122 MMHG | TEMPERATURE: 98 F | OXYGEN SATURATION: 97 % | WEIGHT: 293 LBS

## 2018-03-18 DIAGNOSIS — M25.531 RIGHT WRIST PAIN: Primary | ICD-10-CM

## 2018-03-18 PROCEDURE — 99213 OFFICE O/P EST LOW 20 MIN: CPT | Performed by: NURSE PRACTITIONER

## 2018-03-18 NOTE — PATIENT INSTRUCTIONS
Instructed patient to continue to use prescription Norco as given by surgeon as needed for pain relief and follow up with surgeon for ongoing right wrist pain in the morning patient verbalized understanding of all these discharge instructions

## 2018-03-18 NOTE — PROGRESS NOTES
[facility]        NAME: Cecy Kohler is a 40 y o  female  : 1980    MRN: [id number]  DATE: 2018  TIME: 12:52 PM    Assessment and Plan   Right wrist pain [M25 531]  1  Right wrist pain           Patient Instructions       Follow up with PCP in 3-5 days  Proceed to  ER if symptoms worsen  Chief Complaint     Chief Complaint   Patient presents with    Hand Pain     3/14 carpel tunnel surgery and felt pain Yesterday felt pain in right hand area         History of Present Illness       69-year-old female presents urgent care with chief complaint of right wrist pain  Had recent right carpal tunnel surgery on 2018  She states she has used the warmth on her car yesterday which cause increasing into right wrist pain  Has been using prescription Norco as directed for pain relief      Hand Pain          Review of Systems   Review of Systems   Constitutional: Negative  HENT: Negative  Eyes: Negative  Respiratory: Negative  Cardiovascular: Negative  Gastrointestinal: Negative  Endocrine: Negative  Genitourinary: Negative  Musculoskeletal: Positive for joint swelling (right wrist pain)  Skin: Negative  Allergic/Immunologic: Negative  Neurological: Negative  Hematological: Negative  Psychiatric/Behavioral: Negative  Current Medications       Current Outpatient Prescriptions:     albuterol (PROVENTIL HFA,VENTOLIN HFA) 90 mcg/act inhaler, 2 puffs every 3-4 hours with spacer as needed for wheeze, cough, short of breath   (Patient taking differently: 2 puffs every 4 (four) hours as needed PT REPORTS SHE NO LONGER HAS ONE ), Disp: 1 Inhaler, Rfl: 0    HYDROcodone-acetaminophen (NORCO) 5-325 mg per tablet, Take 1 tablet by mouth every 6 (six) hours as needed for pain for up to 10 days Max Daily Amount: 4 tablets, Disp: 8 tablet, Rfl: 0    Current Allergies     Allergies as of 2018 - Reviewed 2018   Allergen Reaction Noted    Amoxicillin Rash 2014            The following portions of the patient's history were reviewed and updated as appropriate: allergies, current medications, past family history, past medical history, past social history, past surgical history and problem list      Past Medical History:   Diagnosis Date    Asthma     last assessed 10/15/15    GERD (gastroesophageal reflux disease)     last assessed 10/15/15       Past Surgical History:   Procedure Laterality Date     SECTION      last assessed 14    CHOLECYSTECTOMY      last assessed 14    DILATION AND CURETTAGE OF UTERUS      DILATION AND CURETTAGE OF UTERUS      OTHER SURGICAL HISTORY Right     right fallopian tube removed    [place] LAP,FULGURATE/EXCISE LESIONS Right 2017    Procedure: LAPAROSCOPIC OVARIAN CYSTECTOMY VERSUS OOPHERECTOMY;  Surgeon: Meg Crane MD;  [facility];  Service: Gynecology    GA REVISE MEDIAN N/CARPAL TUNNEL SURG Right 3/14/2018    Procedure: RELEASE CARPAL TUNNEL;  Surgeon: Alejandra Young MD;  [facility];  Service: Orthopedics    TONSILLECTOMY      last assessed 14       Family History   Problem Relation Age of Onset    Arthritis Mother     Atrial fibrillation Mother     COPD Mother     Hypertension Mother    Wash Caller Other Mother      urinary incontinence    Depression Father     Anxiety disorder Father     Arthritis Father     Diabetes Father     Hypertension Father     Heart disease Father     Hyperlipidemia Father     Hydrocephalus Father     Urinary tract infection Father     Hodgkin's lymphoma Brother      and non hodgkin's-per allscripts    Stomach cancer Maternal Aunt    Wash Caller Migraines Daughter     Other Daughter      neuroblastoma    Jaundice Daughter     Lung disease Maternal Uncle      mesothelioma         Medications have been verified          Objective   /62   Pulse 78   Temp 98 °F (36 7 °C)   Resp 18   Ht 6' (1 829 m)   Wt (!) 143 kg (315 lb)   LMP 10/18/2017   SpO2 97%   BMI 42 72 kg/m²        Physical Exam     Physical Exam   Constitutional: Vital signs are normal  She appears well-developed and well-nourished  She is active and cooperative  HENT:   Head: Normocephalic and atraumatic  Right Ear: Hearing normal    Left Ear: Hearing normal    Nose: Nose normal    Mouth/Throat: Oropharynx is clear and moist    Cardiovascular: Normal rate, regular rhythm, normal heart sounds and normal pulses  Pulmonary/Chest: Effort normal and breath sounds normal    Musculoskeletal:        Arms:  Neurological: She is alert

## 2018-03-23 ENCOUNTER — OFFICE VISIT (OUTPATIENT)
Dept: OBGYN CLINIC | Facility: CLINIC | Age: 38
End: 2018-03-23

## 2018-03-23 VITALS
HEIGHT: 72 IN | BODY MASS INDEX: 39.68 KG/M2 | WEIGHT: 293 LBS | HEART RATE: 63 BPM | SYSTOLIC BLOOD PRESSURE: 113 MMHG | DIASTOLIC BLOOD PRESSURE: 77 MMHG

## 2018-03-23 DIAGNOSIS — G56.01 RIGHT CARPAL TUNNEL SYNDROME: Primary | ICD-10-CM

## 2018-03-23 PROCEDURE — 99024 POSTOP FOLLOW-UP VISIT: CPT | Performed by: ORTHOPAEDIC SURGERY

## 2018-03-23 NOTE — PATIENT INSTRUCTIONS
It is important that you work on stretching your hand and fingers  For each joint in the hand and wrist you will hold the stretch for a minimum of 30 to 40 seconds  The stretches should be done pushing the joint as much as possible, causing some discomfort  Part way through the stretch, try and push the stretch even harder  Stretches should be done 3 times a day at minimal   The more frequently the stretches are done, the more intense this stretch is, and the longer the stretches held, the more progress will be made  As the fingers and hands loosened up and become less stiff, the pain will improve  For the fingers, each specific joint you will press and hold down and then you will also straighten it all the way out  For the wrist you will work on stretching it up, down as well as side to side  Work on spreading your fingers as far apart as possible  If you have a cast or splint on, do as much as the splint/cast allows

## 2018-03-23 NOTE — PROGRESS NOTES
Assessment:     1  Right carpal tunnel syndrome          Plan:     Problem List Items Addressed This Visit        Nervous and Auditory    RESOLVED: Right carpal tunnel syndrome - Primary     Status post a right carpal tunnel release with significant improvement in overall symptoms  Incision is healing very well  Patient was counseled on soft tissue massage, gradual return to activities  Follow up as needed  Patient ID: Celina Huitron is a 40 y o  female  Chief Complaint:  60-year-old female status post a right carpal tunnel release on March 14th  She comes in stating that things are feeling much better for her  She has had no complaints  Her pain has been well controlled and she has not had any issues postoperatively  The numbness and tingling is significantly improved in the hand  Subjective:        Allergy:  Allergies   Allergen Reactions    Amoxicillin Rash       Medications:  all current active meds have been reviewed    ROS:  Review of Systems    Objective:  BP Readings from Last 1 Encounters:   03/23/18 113/77      Wt Readings from Last 1 Encounters:   03/23/18 (!) 143 kg (315 lb)        Exam:   Physical Exam  Right Hand Exam     Comments:  Incision is healing very well, near full range of motion of hand, sensation light touch is intact in a median, radial, and ulnar nerve distribution, no erythema or drainage

## 2018-03-23 NOTE — ASSESSMENT & PLAN NOTE
Status post a right carpal tunnel release with significant improvement in overall symptoms  Incision is healing very well  Patient was counseled on soft tissue massage, gradual return to activities  Follow up as needed

## 2018-04-10 ENCOUNTER — OFFICE VISIT (OUTPATIENT)
Dept: OBGYN CLINIC | Facility: CLINIC | Age: 38
End: 2018-04-10
Payer: COMMERCIAL

## 2018-04-10 VITALS — BODY MASS INDEX: 42.64 KG/M2 | SYSTOLIC BLOOD PRESSURE: 110 MMHG | DIASTOLIC BLOOD PRESSURE: 80 MMHG | WEIGHT: 293 LBS

## 2018-04-10 DIAGNOSIS — Z30.09 COUNSELING FOR BIRTH CONTROL REGARDING INTRAUTERINE DEVICE (IUD): Primary | ICD-10-CM

## 2018-04-10 DIAGNOSIS — N92.6 IRREGULAR PERIODS/MENSTRUAL CYCLES: ICD-10-CM

## 2018-04-10 PROCEDURE — 99212 OFFICE O/P EST SF 10 MIN: CPT | Performed by: OBSTETRICS & GYNECOLOGY

## 2018-04-10 NOTE — PROGRESS NOTES
Assessment/Plan:      Diagnoses and all orders for this visit:    Counseling for birth control regarding intrauterine device (IUD)    Irregular periods/menstrual cycles        I counseled the patient on an IUD  We have previously discussed this option  She was provided with the insurance checklist   Due to her irregular cycles and abstinence would place without her period  She will return this to the office asap  Subjective:     Patient ID: Nicola Taylor is a 40 y o  female  Patient here today with complaint of irregular menstrual cycles  Her  is incarcerated for another 10 months and she would like to have an IUD placed for contraception before he is released  She is uncertain about future fertility  She can sometimes skip a month or two with her periods  She is not currently sexually active  She reports her insurance will cover the Mirena  Review of Systems   Genitourinary: Positive for menstrual problem  Negative for difficulty urinating, pelvic pain, vaginal bleeding, vaginal discharge and vaginal pain           Past Medical History:   Diagnosis Date    Asthma     last assessed 10/15/15    GERD (gastroesophageal reflux disease)     last assessed 10/15/15     Past Surgical History:   Procedure Laterality Date     SECTION      last assessed 14    CHOLECYSTECTOMY      last assessed 14    DILATION AND CURETTAGE OF UTERUS      DILATION AND CURETTAGE OF UTERUS      OTHER SURGICAL HISTORY Right     right fallopian tube removed    [place] LAP,FULGURATE/EXCISE LESIONS Right 2017    Procedure: LAPAROSCOPIC OVARIAN CYSTECTOMY VERSUS OOPHERECTOMY;  Surgeon: Jocelyn Mascorro MD;  [facility];  Service: Gynecology    [place] REVISE MEDIAN N/CARPAL TUNNEL SURG Right 3/14/2018    Procedure: RELEASE CARPAL TUNNEL;  Surgeon: Haven Calge MD;  [facility];  Service: Orthopedics    TONSILLECTOMY      last assessed 14       Objective:  BP 110/80   Wt (!) 143 kg (314 lb 6 4 oz)   LMP 03/20/2018   BMI 42 64 kg/m²      Physical Exam   Constitutional: She appears well-developed and well-nourished  Vitals reviewed

## 2018-04-12 ENCOUNTER — TELEPHONE (OUTPATIENT)
Dept: OBGYN CLINIC | Facility: CLINIC | Age: 38
End: 2018-04-12

## 2018-04-16 ENCOUNTER — APPOINTMENT (EMERGENCY)
Dept: RADIOLOGY | Facility: HOSPITAL | Age: 38
End: 2018-04-16
Payer: COMMERCIAL

## 2018-04-16 ENCOUNTER — HOSPITAL ENCOUNTER (EMERGENCY)
Facility: HOSPITAL | Age: 38
Discharge: HOME | End: 2018-04-16
Attending: EMERGENCY MEDICINE | Admitting: EMERGENCY MEDICINE
Payer: COMMERCIAL

## 2018-04-16 VITALS
HEART RATE: 63 BPM | TEMPERATURE: 97.2 F | BODY MASS INDEX: 42.79 KG/M2 | RESPIRATION RATE: 15 BRPM | SYSTOLIC BLOOD PRESSURE: 130 MMHG | WEIGHT: 293 LBS | OXYGEN SATURATION: 100 % | DIASTOLIC BLOOD PRESSURE: 78 MMHG

## 2018-04-16 DIAGNOSIS — R11.10 VOMITING: ICD-10-CM

## 2018-04-16 DIAGNOSIS — R07.9 CHEST PAIN: Primary | ICD-10-CM

## 2018-04-16 DIAGNOSIS — R11.0 NAUSEA: ICD-10-CM

## 2018-04-16 LAB
ALBUMIN SERPL BCP-MCNC: 3.7 G/DL (ref 3.5–5)
ALP SERPL-CCNC: 68 U/L (ref 46–116)
ALT SERPL W P-5'-P-CCNC: 30 U/L (ref 12–78)
ANION GAP SERPL CALCULATED.3IONS-SCNC: 7 MMOL/L (ref 4–13)
AST SERPL W P-5'-P-CCNC: 13 U/L (ref 5–45)
BASOPHILS # BLD AUTO: 0.04 THOUSANDS/ΜL (ref 0–0.1)
BASOPHILS NFR BLD AUTO: 1 % (ref 0–1)
BILIRUB SERPL-MCNC: 0.2 MG/DL (ref 0.2–1)
BUN SERPL-MCNC: 11 MG/DL (ref 5–25)
CALCIUM SERPL-MCNC: 8.7 MG/DL (ref 8.3–10.1)
CHLORIDE SERPL-SCNC: 103 MMOL/L (ref 100–108)
CO2 SERPL-SCNC: 29 MMOL/L (ref 21–32)
CREAT SERPL-MCNC: 0.75 MG/DL (ref 0.6–1.3)
DEPRECATED D DIMER PPP: <270 NG/ML (FEU) (ref 0–424)
EOSINOPHIL # BLD AUTO: 0.26 THOUSAND/ΜL (ref 0–0.61)
EOSINOPHIL NFR BLD AUTO: 3 % (ref 0–6)
ERYTHROCYTE [DISTWIDTH] IN BLOOD BY AUTOMATED COUNT: 12.5 % (ref 11.6–15.1)
EXT PREG TEST URINE: NORMAL
GFR SERPL CREATININE-BSD FRML MDRD: 103 ML/MIN/1.73SQ M
GLUCOSE SERPL-MCNC: 92 MG/DL (ref 65–140)
HCT VFR BLD AUTO: 37.3 % (ref 34.8–46.1)
HGB BLD-MCNC: 12.9 G/DL (ref 11.5–15.4)
LACTATE SERPL-SCNC: 1.1 MMOL/L (ref 0.5–2)
LIPASE SERPL-CCNC: 104 U/L (ref 73–393)
LYMPHOCYTES # BLD AUTO: 2.91 THOUSANDS/ΜL (ref 0.6–4.47)
LYMPHOCYTES NFR BLD AUTO: 36 % (ref 14–44)
MAGNESIUM SERPL-MCNC: 1.9 MG/DL (ref 1.6–2.6)
MCH RBC QN AUTO: 29.6 PG (ref 26.8–34.3)
MCHC RBC AUTO-ENTMCNC: 34.6 G/DL (ref 31.4–37.4)
MCV RBC AUTO: 86 FL (ref 82–98)
MONOCYTES # BLD AUTO: 0.42 THOUSAND/ΜL (ref 0.17–1.22)
MONOCYTES NFR BLD AUTO: 5 % (ref 4–12)
NEUTROPHILS # BLD AUTO: 4.53 THOUSANDS/ΜL (ref 1.85–7.62)
NEUTS SEG NFR BLD AUTO: 55 % (ref 43–75)
PLATELET # BLD AUTO: 306 THOUSANDS/UL (ref 149–390)
PMV BLD AUTO: 9.4 FL (ref 8.9–12.7)
POTASSIUM SERPL-SCNC: 3.6 MMOL/L (ref 3.5–5.3)
PROT SERPL-MCNC: 7.5 G/DL (ref 6.4–8.2)
RBC # BLD AUTO: 4.36 MILLION/UL (ref 3.81–5.12)
SODIUM SERPL-SCNC: 139 MMOL/L (ref 136–145)
TROPONIN I SERPL-MCNC: <0.02 NG/ML
WBC # BLD AUTO: 8.16 THOUSAND/UL (ref 4.31–10.16)

## 2018-04-16 PROCEDURE — 81025 URINE PREGNANCY TEST: CPT | Performed by: EMERGENCY MEDICINE

## 2018-04-16 PROCEDURE — 83605 ASSAY OF LACTIC ACID: CPT | Performed by: EMERGENCY MEDICINE

## 2018-04-16 PROCEDURE — 96361 HYDRATE IV INFUSION ADD-ON: CPT

## 2018-04-16 PROCEDURE — 71046 X-RAY EXAM CHEST 2 VIEWS: CPT

## 2018-04-16 PROCEDURE — 36415 COLL VENOUS BLD VENIPUNCTURE: CPT | Performed by: EMERGENCY MEDICINE

## 2018-04-16 PROCEDURE — 84484 ASSAY OF TROPONIN QUANT: CPT | Performed by: EMERGENCY MEDICINE

## 2018-04-16 PROCEDURE — 83735 ASSAY OF MAGNESIUM: CPT | Performed by: EMERGENCY MEDICINE

## 2018-04-16 PROCEDURE — 99285 EMERGENCY DEPT VISIT HI MDM: CPT

## 2018-04-16 PROCEDURE — 80053 COMPREHEN METABOLIC PANEL: CPT | Performed by: EMERGENCY MEDICINE

## 2018-04-16 PROCEDURE — 96375 TX/PRO/DX INJ NEW DRUG ADDON: CPT

## 2018-04-16 PROCEDURE — 96374 THER/PROPH/DIAG INJ IV PUSH: CPT

## 2018-04-16 PROCEDURE — 93005 ELECTROCARDIOGRAM TRACING: CPT

## 2018-04-16 PROCEDURE — 85379 FIBRIN DEGRADATION QUANT: CPT | Performed by: EMERGENCY MEDICINE

## 2018-04-16 PROCEDURE — 83690 ASSAY OF LIPASE: CPT | Performed by: EMERGENCY MEDICINE

## 2018-04-16 PROCEDURE — 85025 COMPLETE CBC W/AUTO DIFF WBC: CPT | Performed by: EMERGENCY MEDICINE

## 2018-04-16 RX ORDER — ONDANSETRON 4 MG/1
4 TABLET, FILM COATED ORAL EVERY 12 HOURS PRN
Qty: 4 TABLET | Refills: 0 | Status: SHIPPED | OUTPATIENT
Start: 2018-04-16 | End: 2018-05-29 | Stop reason: ALTCHOICE

## 2018-04-16 RX ORDER — ONDANSETRON 2 MG/ML
4 INJECTION INTRAMUSCULAR; INTRAVENOUS ONCE
Status: COMPLETED | OUTPATIENT
Start: 2018-04-16 | End: 2018-04-16

## 2018-04-16 RX ORDER — KETOROLAC TROMETHAMINE 30 MG/ML
15 INJECTION, SOLUTION INTRAMUSCULAR; INTRAVENOUS ONCE
Status: COMPLETED | OUTPATIENT
Start: 2018-04-16 | End: 2018-04-16

## 2018-04-16 RX ADMIN — SODIUM CHLORIDE 1000 ML: 0.9 INJECTION, SOLUTION INTRAVENOUS at 22:21

## 2018-04-16 RX ADMIN — ONDANSETRON 4 MG: 2 INJECTION INTRAMUSCULAR; INTRAVENOUS at 22:21

## 2018-04-16 RX ADMIN — KETOROLAC TROMETHAMINE 15 MG: 30 INJECTION, SOLUTION INTRAMUSCULAR at 23:49

## 2018-04-17 LAB
ATRIAL RATE: 71 BPM
P AXIS: 44 DEGREES
PR INTERVAL: 184 MS
QRS AXIS: -2 DEGREES
QRSD INTERVAL: 96 MS
QT INTERVAL: 412 MS
QTC INTERVAL: 447 MS
T WAVE AXIS: 13 DEGREES
VENTRICULAR RATE: 71 BPM

## 2018-04-17 PROCEDURE — 93010 ELECTROCARDIOGRAM REPORT: CPT | Performed by: INTERNAL MEDICINE

## 2018-04-17 NOTE — ED NOTES
Pt laying in bed comfortably  No complaints at this time   Call katlyn Salazar, JORGE ALBERTO  04/16/18 [id number]

## 2018-04-17 NOTE — ED PROVIDER NOTES
History  Chief Complaint   Patient presents with    Chest Pain     pt states since thursday she has had some chest discomfort and nuasea  Patient is a 59-year-old female otherwise healthy coming in today with chest pain that started 5 days ago  Patient states at that time she was at rest, lasted approximately 1 minute described as a deep ache on the left side of her chest and resolved  At that time she had no associated symptoms except for nauseousness  She denies any diaphoresis, vomiting, diarrhea, shortness of breath, radiation of pain into her neck or back  Patient reports since that time she has been having persistent nauseousness as well as fatigue  She has had no fevers, chills, diarrhea, shortness of breath, hemoptysis or chest pain  Patient does report that she has had intermittent epigastric discomfort since that time  She is having decreased p o  intake since that time  She has had no diarrhea, melena, bright red blood per rectum, hemoptysis, hematemesis  Patient has had no recent travel, sick contacts, surgeries, on estrogens  Patient did not take anything for her pain or nauseousness  History provided by:  Patient   used: No    Nausea   The primary symptoms include nausea and vomiting  Primary symptoms do not include fever, weight loss, fatigue, abdominal pain, diarrhea, melena, hematemesis, jaundice, hematochezia, dysuria, myalgias, arthralgias or rash  The illness began 3 to 5 days ago  The onset was gradual  The problem has not changed since onset  Nausea began 3 to 5 days ago  The nausea is associated with eating  The illness does not include chills, anorexia, dysphagia, odynophagia, bloating, constipation, tenesmus, back pain or itching  Associated medical issues do not include inflammatory bowel disease, GERD, gallstones, liver disease, alcohol abuse, PUD, gastric bypass, bowel resection, irritable bowel syndrome, hemorrhoids or diverticulitis  None       Past Medical History:   Diagnosis Date    Asthma     last assessed 10/15/15    GERD (gastroesophageal reflux disease)     last assessed 10/15/15       Past Surgical History:   Procedure Laterality Date     SECTION      last assessed 14    CHOLECYSTECTOMY      last assessed 14    DILATION AND CURETTAGE OF UTERUS      DILATION AND CURETTAGE OF UTERUS      OTHER SURGICAL HISTORY Right     right fallopian tube removed    ND LAP,FULGURATE/EXCISE LESIONS Right 2017    Procedure: LAPAROSCOPIC OVARIAN CYSTECTOMY VERSUS OOPHERECTOMY;  Surgeon: Elizabeth Figueroa MD;  [facility];  Service: Gynecology    ND REVISE MEDIAN N/CARPAL TUNNEL SURG Right 3/14/2018    Procedure: RELEASE CARPAL TUNNEL;  Surgeon: Leah Burgess MD;  Location: [facility];  Service:   Aaron [address]      last assessed 14       Family History   Problem Relation Age of Onset    Arthritis Mother     Atrial fibrillation Mother     COPD Mother     Hypertension Mother    Taylor Arciniega Other Mother      urinary incontinence    Depression Father     Anxiety disorder Father     Arthritis Father     Diabetes Father     Hypertension Father     Heart disease Father     Hyperlipidemia Father     Hydrocephalus Father     Urinary tract infection Father     Hodgkin's lymphoma Brother      and non hodgkin's-per allscripts    Stomach cancer Maternal Aunt    Taylor Arciniega Migraines Daughter     Other Daughter      neuroblastoma    Jaundice Daughter     Lung disease Maternal Uncle      mesothelioma     I have reviewed and agree with the history as documented      Social History   Substance Use Topics    Smoking status: Former Smoker     Packs/day: 0 50     Years: 20 00     Types: Cigarettes     Quit date:     Smokeless tobacco: Never Used      Comment: per allscripts; never a smoker    Alcohol use Yes      Comment: rarely; social        Review of Systems   Constitutional: Negative for chills, diaphoresis, fatigue, fever and weight loss  HENT: Negative for ear pain and sore throat  Eyes: Negative for visual disturbance  Respiratory: Negative for chest tightness and shortness of breath  Cardiovascular: Positive for chest pain (resolved)  Negative for palpitations  Gastrointestinal: Positive for nausea and vomiting  Negative for abdominal pain, anorexia, bloating, constipation, diarrhea, dysphagia, hematemesis, hematochezia, jaundice and melena  Genitourinary: Negative for difficulty urinating and dysuria  Musculoskeletal: Negative for arthralgias, back pain, myalgias and neck pain  Skin: Negative for itching and rash  Neurological: Negative for weakness  Psychiatric/Behavioral: Negative for confusion  All other systems reviewed and are negative  Physical Exam  ED Triage Vitals [04/16/18 2037]   Temperature Pulse Respirations Blood Pressure SpO2   (!) 97 2 °F (36 2 °C) 62 18 129/77 94 %      Temp Source Heart Rate Source Patient Position - Orthostatic VS BP Location FiO2 (%)   Temporal Monitor Sitting Right arm --      Pain Score       7           Orthostatic Vital Signs  Vitals:    04/16/18 2037 04/16/18 2245   BP: 129/77 130/78   Pulse: 62 63   Patient Position - Orthostatic VS: Sitting        Physical Exam   Constitutional: She is oriented to person, place, and time  She appears well-developed and well-nourished  No distress  HENT:   Head: Normocephalic and atraumatic  Mouth/Throat: Oropharynx is clear and moist    Eyes: Conjunctivae and EOM are normal  Pupils are equal, round, and reactive to light  Neck: Normal range of motion  Neck supple  Cardiovascular: Normal rate, regular rhythm, normal heart sounds and intact distal pulses  No murmur heard  Pulmonary/Chest: Effort normal and breath sounds normal  No stridor  No respiratory distress  Abdominal: Soft  Bowel sounds are normal  She exhibits no distension  There is tenderness in the epigastric area   There is no rigidity, no rebound, no guarding, no CVA tenderness, no tenderness at McBurney's point and negative Proctor's sign  Musculoskeletal: Normal range of motion  She exhibits no edema  Neurological: She is alert and oriented to person, place, and time  No cranial nerve deficit  Skin: Skin is warm  Capillary refill takes less than 2 seconds  She is not diaphoretic  Nursing note and vitals reviewed  ED Medications  Medications   sodium chloride 0 9 % bolus 1,000 mL (0 mL Intravenous Stopped 4/16/18 2348)   ondansetron (ZOFRAN) injection 4 mg (4 mg Intravenous Given 4/16/18 2221)   ketorolac (TORADOL) injection 15 mg (15 mg Intravenous Given 4/16/18 2349)       Diagnostic Studies  Results Reviewed     Procedure Component Value Units Date/Time    POCT pregnancy, urine [[id number]]  (Normal) Resulted:  04/16/18 2308    Lab Status:  Edited Result - FINAL Updated:  04/16/18 2325     EXT PREG TEST UR (Ref: Negative) Negative Result    Lactic acid, plasma [[id number]]  (Normal) Collected:  04/16/18 2210    Lab Status:  Final result Specimen:  Blood from Arm, Right Updated:  04/16/18 2254     LACTIC ACID 1 1 mmol/L     Narrative:         Result may be elevated if tourniquet was used during collection      Comprehensive metabolic panel [[id number]] Collected:  04/16/18 2210    Lab Status:  Final result Specimen:  Blood from Arm, Right Updated:  04/16/18 2249     Sodium 139 mmol/L      Potassium 3 6 mmol/L      Chloride 103 mmol/L      CO2 29 mmol/L      Anion Gap 7 mmol/L      BUN 11 mg/dL      Creatinine 0 75 mg/dL      Glucose 92 mg/dL      Calcium 8 7 mg/dL      AST 13 U/L      ALT 30 U/L      Alkaline Phosphatase 68 U/L      Total Protein 7 5 g/dL      Albumin 3 7 g/dL      Total Bilirubin 0 20 mg/dL      eGFR 103 ml/min/1 73sq m     Narrative:         National Kidney Disease Education Program recommendations are as follows:  GFR calculation is accurate only with a steady state creatinine  Chronic Kidney disease less than 60 ml/min/1 73 sq  meters  Kidney failure less than 15 ml/min/1 73 sq  meters  Lipase [[id number]]  (Normal) Collected:  04/16/18 2210    Lab Status:  Final result Specimen:  Blood from Arm, Right Updated:  04/16/18 2249     Lipase 104 u/L     Magnesium [[id number]]  (Normal) Collected:  04/16/18 2210    Lab Status:  Final result Specimen:  Blood from Arm, Right Updated:  04/16/18 2249     Magnesium 1 9 mg/dL     D-Dimer [[id number]]  (Normal) Collected:  04/16/18 2210    Lab Status:  Final result Specimen:  Blood from Arm, Right Updated:  04/16/18 2247     D-Dimer, Elaine Parishi <270 ng/ml (FEU)     Troponin I [[id number]]  (Normal) Collected:  04/16/18 2210    Lab Status:  Final result Specimen:  Blood from Arm, Right Updated:  04/16/18 2244     Troponin I <0 02 ng/mL     Narrative:         [employer] Chemistry analyzer 99% cutoff is > 0 04 ng/mL in network labs    o cTnI 99% cutoff is useful only when applied to patients in the clinical setting of myocardial ischemia  o cTnI 99% cutoff should be interpreted in the context of clinical history, ECG findings and possibly cardiac imaging to establish correct diagnosis  o cTnI 99% cutoff may be suggestive but clearly not indicative of a coronary event without the clinical setting of myocardial ischemia      CBC and differential [[id number]]  (Normal) Collected:  04/16/18 2210    Lab Status:  Final result Specimen:  Blood from Arm, Right Updated:  04/16/18 2227     WBC 8 16 Thousand/uL      RBC 4 36 Million/uL      Hemoglobin 12 9 g/dL      Hematocrit 37 3 %      MCV 86 fL      MCH 29 6 pg      MCHC 34 6 g/dL      RDW 12 5 %      MPV 9 4 fL      Platelets 597 Thousands/uL      Neutrophils Relative 55 %      Lymphocytes Relative 36 %      Monocytes Relative 5 %      Eosinophils Relative 3 %      Basophils Relative 1 %      Neutrophils Absolute 4 53 Thousands/µL      Lymphocytes Absolute 2 91 Thousands/µL      Monocytes Absolute 0 42 Thousand/µL      Eosinophils Absolute 0 26 Thousand/µL      Basophils Absolute 0 04 Thousands/µL                  XR chest 2 views   Final Result by Bernie Whiteside MD (04/17 0184)      No acute cardiopulmonary disease              Workstation performed: [id number]                    Procedures  Procedures       Phone Contacts  ED Phone Contact    ED Course  ED Course          HEART Risk Score    Flowsheet Row Most Recent Value   History  0 Filed at: 04/16/2018 2254   ECG  1 Filed at: 04/16/2018 2254   Age  0 Filed at: 04/16/2018 2254   Risk Factors  0 Filed at: 04/16/2018 2254   Troponin  0 Filed at: 04/16/2018 2254   Heart Score Risk Calculator   History  0 Filed at: 04/16/2018 2254   ECG  1 Filed at: 04/16/2018 2254   Age  0 Filed at: 04/16/2018 2254   Risk Factors  0 Filed at: 04/16/2018 2254   Troponin  0 Filed at: 04/16/2018 2254   HEART Score  1 Filed at: 04/16/2018 2254   HEART Score  1 Filed at: 04/16/2018 2254            PERC Rule for PE    Flowsheet Row Most Recent Value   PERC Rule for PE   Age >=50  0 Filed at: 04/16/2018 2254   HR >=100  0 Filed at: 04/16/2018 2254   O2 Sat on room air < 95%  0 Filed at: 04/16/2018 2254   History of PE or DVT  0 Filed at: 04/16/2018 2254   Recent trauma or surgery  0 Filed at: 04/16/2018 2254   Hemoptysis  0 Filed at: 04/16/2018 2254   Exogenous estrogen  0 Filed at: 04/16/2018 2254   Unilateral leg swelling  0 Filed at: 04/16/2018 2254   PERC Rule for PE Results  0 Filed at: 04/16/2018 2254                Ermias Benton' Criteria for PE    Flowsheet Row Most Recent Value   Wells' Criteria for PE   Clinical signs and symptoms of DVT  0 Filed at: 04/16/2018 2254   PE is primary diagnosis or equally likely  0 Filed at: 04/16/2018 2254   HR >100  0 Filed at: 04/16/2018 2254   Immobilization at least 3 days or Surgery in the previous 4 weeks  0 Filed at: 04/16/2018 2254   Previous, objectively diagnosed PE or DVT  0 Filed at: 04/16/2018 2254   Hemoptysis  0 Filed at: 04/16/2018 2254   Malignancy with treatment within 6 months or palliative  0 Filed at: 04/16/2018 2254   Fawad' Criteria Total  0 Filed at: 04/16/2018 2254            [facility]  Number of Diagnoses or Management Options  Diagnosis management comments: Patient is a healthy 28-year-old female coming in today with multiple complaints of chest pain that has resolved, fatigue, epigastric discomfort, nauseousness  Patient on my exam is nontoxic appearing  Will obtain EKG, labs, x-ray  Will provide Zofran at this time  EKG 2032: normal sinus rhythm at 70 beats per minute  Normal axis  Intervals within normal limits  QTC measured at 447 ms  Artifact present  Non specific ST segment changes  LVH  No old to compare  Amount and/or Complexity of Data Reviewed  Clinical lab tests: ordered and reviewed  Tests in the radiology section of CPT®: ordered  Tests in the medicine section of CPT®: ordered and reviewed      CritCare Time    Disposition  Final diagnoses:   Chest pain   Nausea   Vomiting     Time reflects when diagnosis was documented in both MDM as applicable and the Disposition within this note     Time User Action Codes Description Comment    4/16/2018 11:14 PM Floydene Maria Luisa Add [R07 9] Chest pain     4/16/2018 11:14 PM Jaclyn Kolb Add [R11 0] Nausea     4/16/2018 11:14 PM Inge Kolb Add [R11 10] Vomiting       ED Disposition     ED Disposition Condition Comment    Discharge  Cheryl Sloop discharge to home/self care      Condition at discharge: Stable        Follow-up Information     Follow up With Specialties Details Why [address], [address] [facility], Nurse Practitioner Schedule an appointment as soon as possible for a visit in 3 days  [facility]  [address]  [phone]          Discharge Medication List as of 4/16/2018 11:37 PM      START taking these medications    Details   ondansetron (ZOFRAN) 4 mg tablet Take 1 tablet (4 mg total) by mouth every 12 (twelve) hours as needed for nausea or vomiting for up to 2 days, Starting Mon 4/16/2018, Until Wed 4/18/2018, Print           No discharge procedures on file      ED Provider  Electronically Signed by           Sammi Us DO  04/19/18 5975

## 2018-04-17 NOTE — DISCHARGE INSTRUCTIONS
B  R  A  T  DIET Your doctor has recommended the B R A T  diet for you or your child until his or her condition improves  This is often used to help control diarrhea and vomiting symptoms  If you or your child can tolerate clear liquids, you may offer: · Bananas · Rice · Applesauce · Toast (and other simple starches such as crackers, potatoes, noodles) Be sure to avoid dairy products, meats, and fatty foods until your child's symptoms are completely better  Acute Nausea and Vomiting   WHAT YOU NEED TO KNOW:   Acute nausea and vomiting start suddenly, worsen quickly, and last a short time  DISCHARGE INSTRUCTIONS:   Seek care immediately if:   · You see blood in your vomit or your bowel movements  · You have sudden, severe pain in your chest and upper abdomen after hard vomiting or retching  · You have swelling in your neck and chest      · You are dizzy, cold, and thirsty and your eyes and mouth are dry  · You are urinating very little or not at all  · You have muscle weakness, leg cramps, and trouble breathing  · Your heart is beating much faster than normal      · You continue to vomit for more than 48 hours  Contact your healthcare provider if:   · You have frequent dry heaves (vomiting but nothing comes out)  · Your nausea and vomiting does not get better or go away after you use medicine  · You have questions or concerns about your condition or treatment  Medicines: You may need any of the following:  · Medicines  may be given to calm your stomach and stop your vomiting  You may also need medicines to help you feel more relaxed or to stop nausea and vomiting caused by motion sickness  · Gastrointestinal stimulants  are used to help empty your stomach and bowels  This may help decrease nausea and vomiting  · Take your medicine as directed  Contact your healthcare provider if you think your medicine is not helping or if you have side effects   Tell him or her if you are allergic to any medicine  Keep a list of the medicines, vitamins, and herbs you take  Include the amounts, and when and why you take them  Bring the list or the pill bottles to follow-up visits  Carry your medicine list with you in case of an emergency  Prevent or manage acute nausea and vomiting:   · Do not drink alcohol  Alcohol may upset or irritate your stomach  Too much alcohol can also cause acute nausea and vomiting  · Control stress  Headaches due to stress may cause nausea and vomiting  Find ways to relax and manage your stress  Get more rest and sleep  · Drink more liquids as directed  Vomiting can lead to dehydration  It is important to drink more liquids to help replace lost body fluids  Ask your healthcare provider how much liquid to drink each day and which liquids are best for you  Your provider may recommend that you drink an oral rehydration solution (ORS)  ORS contains water, salts, and sugar that are needed to replace the lost body fluids  Ask what kind of ORS to use, how much to drink, and where to get it  · Eat smaller meals, more often  Eat small amounts of food every 2 to 3 hours, even if you are not hungry  Food in your stomach may decrease your nausea  · Talk to your healthcare provider before you take over-the-counter (OTC) medicines  These medicines can cause serious problems if you use certain other medicines, or you have a medical condition  You may have problems if you use too much or use them for longer than the label says  Follow directions on the label carefully  Follow up with your healthcare provider as directed:  Write down your questions so you remember to ask them during your visits  © 2017 [facility] Information is for End User's use only and may not be sold, redistributed or otherwise used for commercial purposes  All illustrations and images included in CareNotes® are the copyrighted property of A D A M , Inc  or [employer]    The above information is an  only  It is not intended as medical advice for individual conditions or treatments  Talk to your doctor, nurse or pharmacist before following any medical regimen to see if it is safe and effective for you  Chest Pain   WHAT YOU NEED TO KNOW:   Chest pain can be caused by a range of conditions, from not serious to life-threatening  Chest pain can be a symptom of a digestive problem, such as acid reflux or a stomach ulcer  An anxiety attack or a strong emotion, such as anger, can also cause chest pain  Infection, inflammation, or a fracture in the bones or cartilage in your chest can cause pain or discomfort  Sometimes chest pain or pressure is caused by poor blood flow to your heart (angina)  Chest pain may also be caused by life-threatening conditions such as a heart attack or blood clot in your lungs  DISCHARGE INSTRUCTIONS:   Call 911 if:   · You have any of the following signs of a heart attack:      ¨ Squeezing, pressure, or pain in your chest that lasts longer than 5 minutes or returns    ¨ Discomfort or pain in your back, neck, jaw, stomach, or arm     ¨ Trouble breathing    ¨ Nausea or vomiting    ¨ Lightheadedness or a sudden cold sweat, especially with chest pain or trouble breathing    Seek care immediately if:   · You have chest discomfort that gets worse, even with medicine  · You cough or vomit blood  · Your bowel movements are black or bloody  · You cannot stop vomiting, or it hurts to swallow  Contact your healthcare provider if:   · You have questions or concerns about your condition or care  Medicines:   · Medicines  may be given to treat the cause of your chest pain  Examples include pain medicine, anxiety medicine, or medicines to increase blood flow to your heart  · Do not take certain medicines without asking your healthcare provider first   These include NSAIDs, herbal or vitamin supplements, or hormones (estrogen or progestin)  · Take your medicine as directed  Contact your healthcare provider if you think your medicine is not helping or if you have side effects  Tell him or her if you are allergic to any medicine  Keep a list of the medicines, vitamins, and herbs you take  Include the amounts, and when and why you take them  Bring the list or the pill bottles to follow-up visits  Carry your medicine list with you in case of an emergency  Follow up with your healthcare provider within 72 hours, or as directed: You may need to return for more tests to find the cause of your chest pain  You may be referred to a specialist, such as a cardiologist or gastroenterologist  Write down your questions so you remember to ask them during your visits  Healthy living tips: The following are general healthy guidelines  If your chest pain is caused by a heart problem, your healthcare provider will give you specific guidelines to follow  · Do not smoke  Nicotine and other chemicals in cigarettes and cigars can cause lung and heart damage  Ask your healthcare provider for information if you currently smoke and need help to quit  E-cigarettes or smokeless tobacco still contain nicotine  Talk to your healthcare provider before you use these products  · Eat a variety of healthy, low-fat foods  Healthy foods include fruits, vegetables, whole-grain breads, low-fat dairy products, beans, lean meats, and fish  Ask for more information about a heart healthy diet  · Ask about activity  Your healthcare provider will tell you which activities to limit or avoid  Ask when you can drive, return to work, and have sex  Ask about the best exercise plan for you  · Maintain a healthy weight  Ask your healthcare provider how much you should weigh  Ask him or her to help you create a weight loss plan if you are overweight    © 2017 [address] [employer] Information is for End User's use only and may not be sold, redistributed or otherwise used for commercial purposes  All illustrations and images included in CareNotes® are the copyrighted property of A D A M , Inc  or [employer]  The above information is an  only  It is not intended as medical advice for individual conditions or treatments  Talk to your doctor, nurse or pharmacist before following any medical regimen to see if it is safe and effective for you

## 2018-04-18 ENCOUNTER — VBI (OUTPATIENT)
Dept: ADMINISTRATIVE | Facility: OTHER | Age: 38
End: 2018-04-18

## 2018-04-19 NOTE — TELEPHONE ENCOUNTER
[facility]    ED Visit Information     Ed visit date: 4/16/18  Diagnosis Description: Chest pain; Nausea; Vomiting  In Network? Yes [address]  Discharge status: Home  Discharged with meds ? No  Number of ED visits to date: 1  ED Severity:3     Outreach Information    Outreach successful: Yes 2  Date letter mailed:n/a  Date Finalized:4/19/18    Care Coordination    Follow up appointment with pcp: yes will send in basket message to f/u  Transportation issues ? No    [employer] type:  3 Day Outreach  Patient refsued the answer questions:  No  Emergent necessity warranted by diagnosis:  Yes  [facility] PCP:  Yes  Transportation:  Self Transport  Called PCP first?:  No  Feels able to call PCP for urgent problems ?:  Yes  Understands what emergencies can be handled by PCP ?:  Yes  Ever any problems getting appointment with PCP for minor emergency/urgency problems?:  Yes  Practice Contacted Patient ?:  No  Pt had ED follow up with pcp/staff ?:  No    Reason Patient went to ED instead of Urgent Care or PCP?:  Perceived Severity of Illness  Urgent care Education?:  Yes  [facility], pt states she  b/c her symptoms she felt they would send her to ED anyway  Pt states she would like a f/u with PCP, in basket message will be sent on her behalf  Pt stated she did not call the PCP prior to going to ED b/c she feels its fairly hard to get an appt  with the office

## 2018-04-20 ENCOUNTER — TELEPHONE (OUTPATIENT)
Dept: OBGYN CLINIC | Facility: CLINIC | Age: 38
End: 2018-04-20

## 2018-04-20 NOTE — TELEPHONE ENCOUNTER
Pts mirena was delivered today [facility]  It is in [facility] med room  Pt can be contacted to schedule  Thank you!

## 2018-05-29 ENCOUNTER — TELEPHONE (OUTPATIENT)
Dept: OBGYN CLINIC | Facility: CLINIC | Age: 38
End: 2018-05-29

## 2018-05-29 ENCOUNTER — OFFICE VISIT (OUTPATIENT)
Dept: OBGYN CLINIC | Facility: CLINIC | Age: 38
End: 2018-05-29
Payer: COMMERCIAL

## 2018-05-29 VITALS — DIASTOLIC BLOOD PRESSURE: 80 MMHG | WEIGHT: 293 LBS | BODY MASS INDEX: 42.04 KG/M2 | SYSTOLIC BLOOD PRESSURE: 120 MMHG

## 2018-05-29 DIAGNOSIS — Z30.430 ENCOUNTER FOR IUD INSERTION: Primary | ICD-10-CM

## 2018-05-29 PROBLEM — N83.201 RIGHT OVARIAN CYST: Status: RESOLVED | Noted: 2017-11-09 | Resolved: 2018-05-29

## 2018-05-29 PROCEDURE — 58300 INSERT INTRAUTERINE DEVICE: CPT | Performed by: OBSTETRICS & GYNECOLOGY

## 2018-05-29 NOTE — PROGRESS NOTES
Iud insertions  Date/Time: 5/29/2018 8:11 AM  Performed by: Abdirahman Crowder  Authorized by: Abdirahman Crowder     Consent:     Consent obtained:  Verbal    Consent given by:  Patient    Procedure risks and benefits discussed: yes      Patient questions answered: yes      Patient agrees, verbalizes understanding, and wants to proceed: yes      Instructions and paperwork completed: yes    Procedure:     Pelvic exam performed: yes      Negative serum pregnancy test: not sexually active, incarcerated        Cervix cleaned and prepped: yes      Speculum placed in vagina: yes      Tenaculum applied to cervix: yes      Uterus sounded: yes      IUD inserted with no complications: yes      IUD type:  Mirena    Strings trimmed: yes      Uterus sound depth (cm):  9 5  Post-procedure:     Patient tolerated procedure well: yes      Patient will follow up after next period: yes    Comments:      /80   Wt (!) 141 kg (310 lb)   LMP 05/05/2018   BMI 42 04 kg/m²

## 2018-07-10 ENCOUNTER — OFFICE VISIT (OUTPATIENT)
Dept: URGENT CARE | Facility: CLINIC | Age: 38
End: 2018-07-10
Payer: COMMERCIAL

## 2018-07-10 VITALS
HEART RATE: 71 BPM | DIASTOLIC BLOOD PRESSURE: 74 MMHG | TEMPERATURE: 98.1 F | SYSTOLIC BLOOD PRESSURE: 133 MMHG | RESPIRATION RATE: 18 BRPM | OXYGEN SATURATION: 95 %

## 2018-07-10 DIAGNOSIS — L02.91 ABSCESS: Primary | ICD-10-CM

## 2018-07-10 PROCEDURE — 99213 OFFICE O/P EST LOW 20 MIN: CPT | Performed by: NURSE PRACTITIONER

## 2018-07-10 RX ORDER — CLINDAMYCIN HYDROCHLORIDE 300 MG/1
300 CAPSULE ORAL 4 TIMES DAILY
Qty: 28 CAPSULE | Refills: 0 | Status: SHIPPED | OUTPATIENT
Start: 2018-07-10 | End: 2018-07-17

## 2018-07-10 NOTE — PATIENT INSTRUCTIONS
Abscess   AMBULATORY CARE:   An abscess  is an area under the skin where pus (infected fluid) collects  An abscess is often caused by bacteria, fungi or other germs that get into an open wound  You can get an abscess anywhere on your body  Common signs and symptoms of an abscess: You may have a swollen mass that is red and painful  Pus may leak out of the mass  The pus will be white or yellow and may smell bad  You may have redness and pain days before the mass appears  You may have a fever and chills if the infection spreads  Seek immediate care if:   · The area around your abscess becomes very painful, warm, or has red streaks  · You have a fever and chills  · Your heart is beating faster than usual      · You feel faint or confused  Contact your healthcare provider if:   · Your abscess gets bigger or does not get better  · Your abscess returns  · You have questions or concerns about your condition or care  Treatment for an abscess: Your healthcare provider may need to make a cut in the abscess to allow the pus to drain  You may need surgery to remove your abscess  You may  need any of the following:  · Antibiotics  help treat a bacterial infection  · Acetaminophen  decreases pain and fever  It is available without a doctor's order  Ask how much to take and how often to take it  Follow directions  Acetaminophen can cause liver damage if not taken correctly  · NSAIDs , such as ibuprofen, help decrease swelling, pain, and fever  This medicine is available with or without a doctor's order  NSAIDs can cause stomach bleeding or kidney problems in certain people  If you take blood thinner medicine, always ask your healthcare provider if NSAIDs are safe for you  Always read the medicine label and follow directions  · Take your medicine as directed  Contact your healthcare provider if you think your medicine is not helping or if you have side effects   Tell him or her if you are allergic to any medicine  Keep a list of the medicines, vitamins, and herbs you take  Include the amounts, and when and why you take them  Bring the list or the pill bottles to follow-up visits  Carry your medicine list with you in case of an emergency  Self-care:   · Apply a warm compress to your abscess  This will help it open and drain  Wet a washcloth in warm, but not hot, water  Apply the compress for 10 minutes  Repeat this 4 times each day  Do not  press on an abscess or try to open it with a needle  You may push the bacteria deeper or into your blood  · Do not share your clothes, towels, or sheets with anyone  This can spread the infection to others  · Wash your hands often  This can help prevent the spread of germs  Use soap and water or an alcohol-based hand rub  Care for your wound after it is drained:   · Care for your wound as directed  If your healthcare provider says it is okay, carefully remove the bandage and gauze packing  You may need to soak the gauze to get it out of your wound  Clean your wound and the area around it as directed  Dry the area and put on new, clean bandages  Change your bandages when they get wet or dirty  · Ask your healthcare provider how to change the gauze in your wound  Keep track of how many pieces of gauze are placed inside the wound  Do not put too much packing in the wound  Do not pack the gauze too tightly in your wound  Follow up with your healthcare provider in 1 to 3 days: You may need to have your packing removed or your bandage changed  Write down your questions so you remember to ask them during your visits  © 2017 [employer] Information is for End User's use only and may not be sold, redistributed or otherwise used for commercial purposes  All illustrations and images included in CareNotes® are the copyrighted property of [employer]  or [employer]  The above information is an  only   It is not intended as medical advice for individual conditions or treatments  Talk to your doctor, nurse or pharmacist before following any medical regimen to see if it is safe and effective for you

## 2018-07-10 NOTE — PROGRESS NOTES
[facility]        NAME: Azalea Wilson is a 41 y o  female  : 1980    MRN: [id number]  DATE: July 10, 2018  TIME: 12:33 PM    Assessment and Plan   Abscess [L02 91]  1  Abscess  clindamycin (CLEOCIN) 300 MG capsule         Patient Instructions       Follow up with PCP in 3-5 days  Proceed to  ER if symptoms worsen  Chief Complaint     Chief Complaint   Patient presents with    Rash     Pt c/o a rash on her right buttock since Saturday  History of Present Illness       71-year-old female presents to urgent care with chief complaint red raised warm area noted right buttock for the past 4 days  Patient has not used any over-the-counter medication reports area has remained unchanged  Denies any fevers or chills        Review of Systems   Review of Systems   Constitutional: Negative  HENT: Negative  Eyes: Negative  Respiratory: Negative  Cardiovascular: Negative  Gastrointestinal: Negative  Endocrine: Negative  Genitourinary: Negative  Musculoskeletal: Negative  Skin: Positive for color change  Allergic/Immunologic: Negative  Neurological: Negative  Hematological: Negative  Psychiatric/Behavioral: Negative  All other systems reviewed and are negative          Current Medications       Current Outpatient Prescriptions:     clindamycin (CLEOCIN) 300 MG capsule, Take 1 capsule (300 mg total) by mouth 4 (four) times a day for 7 days, Disp: 28 capsule, Rfl: 0    Current Allergies     Allergies as of 07/10/2018 - Reviewed 07/10/2018   Allergen Reaction Noted    Amoxicillin Rash 2014            The following portions of the patient's history were reviewed and updated as appropriate: allergies, current medications, past family history, past medical history, past social history, past surgical history and problem list      Past Medical History:   Diagnosis Date    Asthma     last assessed 10/15/15    GERD (gastroesophageal reflux disease)     last assessed 10/15/15       Past Surgical History:   Procedure Laterality Date     SECTION      last assessed 14    CHOLECYSTECTOMY      last assessed 14    DILATION AND CURETTAGE OF UTERUS      DILATION AND CURETTAGE OF UTERUS      OTHER SURGICAL HISTORY Right     right fallopian tube removed    TX LAP,FULGURATE/EXCISE LESIONS Right 2017    Procedure: LAPAROSCOPIC OVARIAN CYSTECTOMY VERSUS OOPHERECTOMY;  Surgeon: Sharron Mata MD;  Location[facility];  Service: Gynecology    TX REVISE MEDIAN N/CARPAL TUNNEL SURG Right 3/14/2018    Procedure: RELEASE CARPAL TUNNEL;  Surgeon: Warner Wyatt MD;  Location: [facility];  Service:   [address]      last assessed 14       Family History   Problem Relation Age of Onset    Arthritis Mother     Atrial fibrillation Mother     COPD Mother     Hypertension Mother    Madi Rueda Other Mother         urinary incontinence    Depression Father     Anxiety disorder Father     Arthritis Father     Diabetes Father     Hypertension Father     Heart disease Father     Hyperlipidemia Father     Hydrocephalus Father     Urinary tract infection Father     Hodgkin's lymphoma Brother         and non hodgkin's-per allscripts    Stomach cancer Maternal Aunt    Madi Rueda Migraines Daughter     Other Daughter         neuroblastoma    Jaundice Daughter     Lung disease Maternal Uncle         mesothelioma         Medications have been verified  Objective   /74   Pulse 71   Temp 98 1 °F (36 7 °C)   Resp 18   SpO2 95%        Physical Exam     Physical Exam   Constitutional: She is oriented to person, place, and time  Vital signs are normal  She appears well-developed  HENT:   Head: Normocephalic and atraumatic     Right Ear: External ear normal    Left Ear: External ear normal    Nose: Nose normal    Mouth/Throat: Oropharynx is clear and moist    Eyes: Conjunctivae and EOM are normal  Pupils are equal, round, and reactive to light  Lids are everted and swept, no foreign bodies found  Neck: Normal range of motion  Neck supple  Cardiovascular: Normal rate, regular rhythm, normal heart sounds and intact distal pulses  Pulmonary/Chest: Effort normal and breath sounds normal    Abdominal: Soft  Normal appearance and bowel sounds are normal    Musculoskeletal: Normal range of motion  Neurological: She is alert and oriented to person, place, and time  She has normal reflexes  Skin: Skin is warm, dry and intact  There is erythema  Psychiatric: She has a normal mood and affect   Her speech is normal and behavior is normal  Judgment and thought content normal

## 2018-08-09 ENCOUNTER — OFFICE VISIT (OUTPATIENT)
Dept: OBGYN CLINIC | Facility: CLINIC | Age: 38
End: 2018-08-09
Payer: COMMERCIAL

## 2018-08-09 VITALS — DIASTOLIC BLOOD PRESSURE: 74 MMHG | WEIGHT: 293 LBS | BODY MASS INDEX: 41.77 KG/M2 | SYSTOLIC BLOOD PRESSURE: 118 MMHG

## 2018-08-09 DIAGNOSIS — Z30.431 IUD CHECK UP: Primary | ICD-10-CM

## 2018-08-09 DIAGNOSIS — R35.0 INCREASED URINARY FREQUENCY: ICD-10-CM

## 2018-08-09 PROCEDURE — 99212 OFFICE O/P EST SF 10 MIN: CPT | Performed by: PHYSICIAN ASSISTANT

## 2018-08-09 NOTE — PROGRESS NOTES
María Sox  1980      CC: IUD check    S: 41 y o  female here for IUD check  She is status post placement of a Mirena IUD on 5/29/18  She has had mild irregular bleeding since placement but nothing bothersome to her  She has had no pain or other side effects  She is complaining of some urinary pressure and frequency  No LMP recorded (within months)  Patient is not currently having periods (Reason: Birth Control)  No current outpatient prescriptions on file  Social History     Social History    Marital status: Single     Spouse name: N/A    Number of children: N/A    Years of education: N/A     Occupational History    Not on file       Social History Main Topics    Smoking status: Former Smoker     Packs/day: 0 50     Years: 20 00     Types: Cigarettes     Quit date: 2015    Smokeless tobacco: Never Used      Comment: per allscripts; never a smoker    Alcohol use Yes      Comment: rarely; social    Drug use: No    Sexual activity: Not Currently     Birth control/ protection: IUD     Other Topics Concern    Not on file     Social History Narrative    No narrative on file     Family History   Problem Relation Age of Onset    Arthritis Mother     Atrial fibrillation Mother     COPD Mother     Hypertension Mother     Other Mother         urinary incontinence    Depression Father     Anxiety disorder Father     Arthritis Father     Diabetes Father     Hypertension Father     Heart disease Father     Hyperlipidemia Father     Hydrocephalus Father     Urinary tract infection Father     Hodgkin's lymphoma Brother         and non hodgkin's-per allscripts    Stomach cancer Maternal Aunt    Vena Per Migraines Daughter     Other Daughter         neuroblastoma    Jaundice Daughter     Lung disease Maternal Uncle         mesothelioma     Past Medical History:   Diagnosis Date    Asthma     last assessed 10/15/15    GERD (gastroesophageal reflux disease)     last assessed 10/15/15 O:  Blood pressure 118/74, weight (!) 140 kg (308 lb), not currently breastfeeding  Abdomen is soft and nontender  External genitals are normal without rashes or lesions  Vagina is normal without discharge or bleeding  Cervix is normal without discharge or lesion  IUD strings are normal      A:  IUD in place    P:  Patient was reassured that irregular bleeding is common for the first six months of IUD use and that this should slowly resolve over time  She will call with any persistently abnormal bleeding or other problems  She will return for her yearly exam as scheduled       Urine c&s sent out, will call if abnormal

## 2018-08-10 ENCOUNTER — TELEPHONE (OUTPATIENT)
Dept: OBGYN CLINIC | Facility: CLINIC | Age: 38
End: 2018-08-10

## 2018-08-10 DIAGNOSIS — N30.00 ACUTE CYSTITIS WITHOUT HEMATURIA: Primary | ICD-10-CM

## 2018-08-10 NOTE — TELEPHONE ENCOUNTER
Left message for patient  Results still in process  Will keep checking back  Asked to call our office and let us know how she is feeling regardless

## 2018-08-13 ENCOUNTER — APPOINTMENT (OUTPATIENT)
Dept: LAB | Facility: CLINIC | Age: 38
End: 2018-08-13
Payer: COMMERCIAL

## 2018-08-13 DIAGNOSIS — N30.00 ACUTE CYSTITIS WITHOUT HEMATURIA: ICD-10-CM

## 2018-08-13 PROCEDURE — 87086 URINE CULTURE/COLONY COUNT: CPT

## 2018-08-13 NOTE — TELEPHONE ENCOUNTER
I checked on pts u/a and it says "lost in transit"  I never saw that before  Shall I order another u/a ?   Thanks

## 2018-08-14 LAB — BACTERIA UR CULT: NORMAL

## 2018-08-14 NOTE — TELEPHONE ENCOUNTER
LM for pt that urine culture was entered into her chart  Gave directions for her to go to any [facility]'s facility, it would be available for them to retrieve it

## 2018-09-24 ENCOUNTER — TELEPHONE (OUTPATIENT)
Dept: OBGYN CLINIC | Facility: CLINIC | Age: 38
End: 2018-09-24

## 2018-09-24 NOTE — TELEPHONE ENCOUNTER
Pt  is having sharp lower pelvic pain for the last  2-3 days on and off, lbp and some brown discharge, she said its not her period,she said she cant feel her iud strings anymore where before she could, please advise

## 2018-09-25 ENCOUNTER — OFFICE VISIT (OUTPATIENT)
Dept: OBGYN CLINIC | Facility: MEDICAL CENTER | Age: 38
End: 2018-09-25
Payer: COMMERCIAL

## 2018-09-25 ENCOUNTER — HOSPITAL ENCOUNTER (OUTPATIENT)
Dept: ULTRASOUND IMAGING | Facility: HOSPITAL | Age: 38
Discharge: HOME | End: 2018-09-25
Payer: COMMERCIAL

## 2018-09-25 VITALS — WEIGHT: 293 LBS | DIASTOLIC BLOOD PRESSURE: 82 MMHG | BODY MASS INDEX: 42.15 KG/M2 | SYSTOLIC BLOOD PRESSURE: 132 MMHG

## 2018-09-25 DIAGNOSIS — R10.2 PELVIC PAIN: ICD-10-CM

## 2018-09-25 DIAGNOSIS — R10.2 PELVIC PAIN: Primary | ICD-10-CM

## 2018-09-25 PROBLEM — Z30.431 IUD CHECK UP: Status: RESOLVED | Noted: 2018-08-09 | Resolved: 2018-09-25

## 2018-09-25 PROCEDURE — 76830 TRANSVAGINAL US NON-OB: CPT

## 2018-09-25 PROCEDURE — 99213 OFFICE O/P EST LOW 20 MIN: CPT | Performed by: PHYSICIAN ASSISTANT

## 2018-09-25 PROCEDURE — 87591 N.GONORRHOEAE DNA AMP PROB: CPT | Performed by: PHYSICIAN ASSISTANT

## 2018-09-25 PROCEDURE — 87491 CHLMYD TRACH DNA AMP PROBE: CPT | Performed by: PHYSICIAN ASSISTANT

## 2018-09-25 PROCEDURE — 76856 US EXAM PELVIC COMPLETE: CPT

## 2018-09-25 NOTE — ASSESSMENT & PLAN NOTE
Pelvic US given, suspect ovarian cyst  If normal rec f/u with GI or PCP  If pain worsens call or RTO

## 2018-09-25 NOTE — PROGRESS NOTES
Assessment/Plan:  Problem List Items Addressed This Visit     Pelvic pain - Primary     Pelvic US given, suspect ovarian cyst  If normal rec f/u with GI or PCP  If pain worsens call or RTO          Relevant Orders    US pelvis complete w transvaginal    Chlamydia/GC amplified DNA by PCR        Subjective:      Patient ID: Nita Juares is a 41 y o  female  Patient presents to office c/o RLQ pain x 2 weeks  Patient states that pain is sharp and intermittent with radiation around to lower back  Pain worse with movement especially laying down and patient denies anything that makes it better  She is also c/o nausea but denies vomiting  Patient had Mirena inserted 5/2018 and has occasional irregular spotting but brown and light  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,  in prison  She denies any fever, chills, dysuria, or vaginal discharge  The following portions of the patient's history were reviewed and updated as appropriate: allergies, current medications, past family history, past medical history, past social history, past surgical history and problem list     Review of Systems   Constitutional: Negative for chills and fever  Gastrointestinal: Positive for abdominal pain and nausea  Negative for constipation, diarrhea and vomiting  Genitourinary: Positive for pelvic pain  Negative for dysuria, vaginal bleeding, vaginal discharge and vaginal pain  Objective:  /82 (BP Location: Right arm)   Wt (!) 141 kg (310 lb 12 8 oz)   BMI 42 15 kg/m²      Physical Exam   Constitutional: She appears well-developed and well-nourished  No distress  Abdominal: Soft  She exhibits no distension  There is tenderness in the right lower quadrant and suprapubic area  There is no rebound and no guarding  Genitourinary: There is no rash, tenderness or lesion on the right labia  There is no rash, tenderness or lesion on the left labia  There is bleeding (min dark blood) in the vagina  No vaginal discharge found  Genitourinary Comments: GC/Chlam cultures done   Nursing note and vitals reviewed

## 2018-09-26 LAB
CHLAMYDIA DNA CVX QL NAA+PROBE: NORMAL
N GONORRHOEA DNA GENITAL QL NAA+PROBE: NORMAL

## 2018-10-15 ENCOUNTER — APPOINTMENT (EMERGENCY)
Dept: CT IMAGING | Facility: HOSPITAL | Age: 38
End: 2018-10-15
Payer: COMMERCIAL

## 2018-10-15 ENCOUNTER — HOSPITAL ENCOUNTER (EMERGENCY)
Facility: HOSPITAL | Age: 38
Discharge: HOME | End: 2018-10-15
Attending: EMERGENCY MEDICINE | Admitting: EMERGENCY MEDICINE
Payer: COMMERCIAL

## 2018-10-15 VITALS
DIASTOLIC BLOOD PRESSURE: 62 MMHG | HEIGHT: 72 IN | BODY MASS INDEX: 39.68 KG/M2 | OXYGEN SATURATION: 99 % | RESPIRATION RATE: 16 BRPM | TEMPERATURE: 98.5 F | WEIGHT: 293 LBS | SYSTOLIC BLOOD PRESSURE: 113 MMHG | HEART RATE: 54 BPM

## 2018-10-15 DIAGNOSIS — R10.9 ABDOMINAL PAIN: Primary | ICD-10-CM

## 2018-10-15 LAB
ALBUMIN SERPL BCP-MCNC: 3.7 G/DL (ref 3.5–5)
ALP SERPL-CCNC: 71 U/L (ref 46–116)
ALT SERPL W P-5'-P-CCNC: 27 U/L (ref 12–78)
ANION GAP SERPL CALCULATED.3IONS-SCNC: 4 MMOL/L (ref 4–13)
APTT PPP: 30 SECONDS (ref 24–36)
AST SERPL W P-5'-P-CCNC: 11 U/L (ref 5–45)
BACTERIA UR QL AUTO: ABNORMAL /HPF
BASOPHILS # BLD AUTO: 0.04 THOUSANDS/ΜL (ref 0–0.1)
BASOPHILS NFR BLD AUTO: 1 % (ref 0–1)
BILIRUB SERPL-MCNC: 0.2 MG/DL (ref 0.2–1)
BILIRUB UR QL STRIP: NEGATIVE
BUN SERPL-MCNC: 10 MG/DL (ref 5–25)
CALCIUM SERPL-MCNC: 8.8 MG/DL (ref 8.3–10.1)
CHLORIDE SERPL-SCNC: 103 MMOL/L (ref 100–108)
CLARITY UR: CLEAR
CO2 SERPL-SCNC: 31 MMOL/L (ref 21–32)
COLOR UR: YELLOW
CREAT SERPL-MCNC: 0.8 MG/DL (ref 0.6–1.3)
EOSINOPHIL # BLD AUTO: 0.22 THOUSAND/ΜL (ref 0–0.61)
EOSINOPHIL NFR BLD AUTO: 3 % (ref 0–6)
ERYTHROCYTE [DISTWIDTH] IN BLOOD BY AUTOMATED COUNT: 12.5 % (ref 11.6–15.1)
EXT PREG TEST URINE: NEGATIVE
GFR SERPL CREATININE-BSD FRML MDRD: 94 ML/MIN/1.73SQ M
GLUCOSE SERPL-MCNC: 106 MG/DL (ref 65–140)
GLUCOSE UR STRIP-MCNC: NEGATIVE MG/DL
HCT VFR BLD AUTO: 37.8 % (ref 34.8–46.1)
HGB BLD-MCNC: 12.6 G/DL (ref 11.5–15.4)
HGB UR QL STRIP.AUTO: ABNORMAL
IMM GRANULOCYTES # BLD AUTO: 0.03 THOUSAND/UL (ref 0–0.2)
IMM GRANULOCYTES NFR BLD AUTO: 0 % (ref 0–2)
INR PPP: 1.01 (ref 0.86–1.17)
KETONES UR STRIP-MCNC: NEGATIVE MG/DL
LACTATE SERPL-SCNC: 0.8 MMOL/L (ref 0.5–2)
LEUKOCYTE ESTERASE UR QL STRIP: NEGATIVE
LIPASE SERPL-CCNC: 124 U/L (ref 73–393)
LYMPHOCYTES # BLD AUTO: 2.51 THOUSANDS/ΜL (ref 0.6–4.47)
LYMPHOCYTES NFR BLD AUTO: 30 % (ref 14–44)
MCH RBC QN AUTO: 28.8 PG (ref 26.8–34.3)
MCHC RBC AUTO-ENTMCNC: 33.3 G/DL (ref 31.4–37.4)
MCV RBC AUTO: 86 FL (ref 82–98)
MONOCYTES # BLD AUTO: 0.45 THOUSAND/ΜL (ref 0.17–1.22)
MONOCYTES NFR BLD AUTO: 5 % (ref 4–12)
NEUTROPHILS # BLD AUTO: 5.1 THOUSANDS/ΜL (ref 1.85–7.62)
NEUTS SEG NFR BLD AUTO: 61 % (ref 43–75)
NITRITE UR QL STRIP: NEGATIVE
NON-SQ EPI CELLS URNS QL MICRO: ABNORMAL /HPF
NRBC BLD AUTO-RTO: 0 /100 WBCS
PH UR STRIP.AUTO: 5.5 [PH] (ref 4.5–8)
PLATELET # BLD AUTO: 301 THOUSANDS/UL (ref 149–390)
PMV BLD AUTO: 9.2 FL (ref 8.9–12.7)
POTASSIUM SERPL-SCNC: 3.8 MMOL/L (ref 3.5–5.3)
PROT SERPL-MCNC: 7.6 G/DL (ref 6.4–8.2)
PROT UR STRIP-MCNC: NEGATIVE MG/DL
PROTHROMBIN TIME: 12.8 SECONDS (ref 11.8–14.2)
RBC # BLD AUTO: 4.38 MILLION/UL (ref 3.81–5.12)
RBC #/AREA URNS AUTO: ABNORMAL /HPF
SODIUM SERPL-SCNC: 138 MMOL/L (ref 136–145)
SP GR UR STRIP.AUTO: <=1.005 (ref 1–1.03)
UROBILINOGEN UR QL STRIP.AUTO: 0.2 E.U./DL
WBC # BLD AUTO: 8.35 THOUSAND/UL (ref 4.31–10.16)
WBC #/AREA URNS AUTO: ABNORMAL /HPF

## 2018-10-15 PROCEDURE — 74177 CT ABD & PELVIS W/CONTRAST: CPT

## 2018-10-15 PROCEDURE — 36415 COLL VENOUS BLD VENIPUNCTURE: CPT | Performed by: PHYSICIAN ASSISTANT

## 2018-10-15 PROCEDURE — 83690 ASSAY OF LIPASE: CPT | Performed by: PHYSICIAN ASSISTANT

## 2018-10-15 PROCEDURE — 96361 HYDRATE IV INFUSION ADD-ON: CPT

## 2018-10-15 PROCEDURE — 85025 COMPLETE CBC W/AUTO DIFF WBC: CPT | Performed by: PHYSICIAN ASSISTANT

## 2018-10-15 PROCEDURE — 80053 COMPREHEN METABOLIC PANEL: CPT | Performed by: PHYSICIAN ASSISTANT

## 2018-10-15 PROCEDURE — 85730 THROMBOPLASTIN TIME PARTIAL: CPT | Performed by: PHYSICIAN ASSISTANT

## 2018-10-15 PROCEDURE — 99284 EMERGENCY DEPT VISIT MOD MDM: CPT

## 2018-10-15 PROCEDURE — 83605 ASSAY OF LACTIC ACID: CPT | Performed by: PHYSICIAN ASSISTANT

## 2018-10-15 PROCEDURE — 85610 PROTHROMBIN TIME: CPT | Performed by: PHYSICIAN ASSISTANT

## 2018-10-15 PROCEDURE — 96360 HYDRATION IV INFUSION INIT: CPT

## 2018-10-15 PROCEDURE — 81025 URINE PREGNANCY TEST: CPT | Performed by: PHYSICIAN ASSISTANT

## 2018-10-15 PROCEDURE — 81001 URINALYSIS AUTO W/SCOPE: CPT | Performed by: PHYSICIAN ASSISTANT

## 2018-10-15 RX ADMIN — IOHEXOL 100 ML: 350 INJECTION, SOLUTION INTRAVENOUS at 19:25

## 2018-10-15 RX ADMIN — SODIUM CHLORIDE 1000 ML: 0.9 INJECTION, SOLUTION INTRAVENOUS at 18:15

## 2018-10-15 NOTE — DISCHARGE INSTRUCTIONS

## 2018-10-15 NOTE — ED NOTES
Patient IV on the right side was hurting her  I took the IV out        Betsey Pandey RN  10/15/18 1639

## 2018-10-15 NOTE — ED PROVIDER NOTES
History  Chief Complaint   Patient presents with    Abdominal Pain     Patient states she has had constant RLQ pain for about 2 weeks  Then today while at work the pain "shot across back (starp stabbing)" with nausea, but no vomiting  Patient presents to the emergency department today offering a chief complaint of right lower quadrant abdominal pain  She states that initially started 2 weeks ago  Saw her OBGYN and states she had negative urinalysis as well as negative ultrasound  Patient states today she noticed that the pain was worse and radiated into back on the right side as well  She denies vaginal bleeding or discharge  She denies urinary urgency frequency or burning  She denies chest pain or shortness of breath              None       Past Medical History:   Diagnosis Date    Asthma     last assessed 10/15/15    GERD (gastroesophageal reflux disease)     last assessed 10/15/15       Past Surgical History:   Procedure Laterality Date     SECTION      last assessed 14    CHOLECYSTECTOMY      last assessed 14    DILATION AND CURETTAGE OF UTERUS      DILATION AND CURETTAGE OF UTERUS      OTHER SURGICAL HISTORY Right     right fallopian tube removed    [place] LAP,FULGURATE/EXCISE LESIONS Right 2017    Procedure: LAPAROSCOPIC OVARIAN CYSTECTOMY VERSUS OOPHERECTOMY;  Surgeon: Hernando Macias MD;  [facility];  Service: Gynecology    OH REVISE MEDIAN N/CARPAL TUNNEL SURG Right 3/14/2018    Procedure: RELEASE CARPAL TUNNEL;  Surgeon: Karla Swain MD;  [facility];  Service: Orthopedics    SALPINGECTOMY      right side    TONSILLECTOMY      last assessed 14       Family History   Problem Relation Age of Onset    Arthritis Mother     Atrial fibrillation Mother     COPD Mother     Hypertension Mother     Other Mother         urinary incontinence    Depression Father     Anxiety disorder Father     Arthritis Father     Diabetes Father    [facility] Hypertension Father     Heart disease Father     Hyperlipidemia Father     Hydrocephalus Father     Urinary tract infection Father     Hodgkin's lymphoma Brother         and non hodgkin's-per allscripts    Stomach cancer Maternal Aunt    [employer] Migraines Daughter     Other Daughter         neuroblastoma    Jaundice Daughter     Lung disease Maternal Uncle         mesothelioma     I have reviewed and agree with the history as documented  Social History   Substance Use Topics    Smoking status: Former Smoker     Packs/day: 0 50     Years: 20 00     Types: Cigarettes     Quit date: 2015    Smokeless tobacco: Never Used      Comment: per allscripts; never a smoker    Alcohol use Yes      Comment: rarely; social        Review of Systems   Constitutional: Negative  HENT: Negative  Eyes: Negative  Respiratory: Negative  Cardiovascular: Negative  Gastrointestinal: Positive for abdominal pain  Negative for abdominal distention, anal bleeding, blood in stool, constipation, diarrhea, nausea, rectal pain and vomiting  Endocrine: Negative  Genitourinary: Negative  Musculoskeletal: Positive for back pain  Negative for arthralgias, gait problem, joint swelling, myalgias, neck pain and neck stiffness  Skin: Negative  Allergic/Immunologic: Negative  Neurological: Negative  Hematological: Negative  Psychiatric/Behavioral: Negative  All other systems reviewed and are negative  Physical Exam  Physical Exam   Constitutional: She is oriented to person, place, and time  She appears well-developed and well-nourished  No distress  HENT:   Head: Normocephalic  Right Ear: External ear normal    Left Ear: External ear normal    Nose: Nose normal    Mouth/Throat: Oropharynx is clear and moist  No oropharyngeal exudate  Eyes: Pupils are equal, round, and reactive to light  EOM are normal    Neck: Normal range of motion  Cardiovascular: Normal rate      Pulmonary/Chest: Effort normal  Abdominal: Soft  Bowel sounds are normal  She exhibits no distension and no mass  There is tenderness  There is no rebound and no guarding  No hernia  Centralized and right lower abdominal tenderness   Musculoskeletal: Normal range of motion  Neurological: She is alert and oriented to person, place, and time  Skin: Skin is warm  Capillary refill takes less than 2 seconds  She is not diaphoretic  Psychiatric: She has a normal mood and affect  Vitals reviewed  Vital Signs  ED Triage Vitals [10/15/18 1729]   Temperature Pulse Respirations Blood Pressure SpO2   98 5 °F (36 9 °C) 69 18 122/73 99 %      Temp Source Heart Rate Source Patient Position - Orthostatic VS BP Location FiO2 (%)   Temporal Monitor Sitting Right arm --      Pain Score       6           Vitals:    10/15/18 1729   BP: 122/73   Pulse: 69   Patient Position - Orthostatic VS: Sitting       Visual Acuity      ED Medications  Medications   sodium chloride 0 9 % bolus 1,000 mL (1,000 mL Intravenous New Bag 10/15/18 1815)   iohexol (OMNIPAQUE) 350 MG/ML injection (MULTI-DOSE) 100 mL (100 mL Intravenous Given 10/15/18 1925)       Diagnostic Studies  Results Reviewed     Procedure Component Value Units Date/Time    Urine Microscopic [[id number]]  (Abnormal) Collected:  10/15/18 1816    Lab Status:  Final result Specimen:  Urine from Urine, Clean Catch Updated:  10/15/18 1843     RBC, UA 1-2 (A) /hpf      WBC, UA 0-1 (A) /hpf      Epithelial Cells Moderate (A) /hpf      Bacteria, UA Occasional /hpf     Lactic acid, plasma [[id number]]  (Normal) Collected:  10/15/18 1816    Lab Status:  Final result Specimen:  Blood from Arm, Right Updated:  10/15/18 1841     LACTIC ACID 0 8 mmol/L     Narrative:         Result may be elevated if tourniquet was used during collection      Comprehensive metabolic panel [[id number]] Collected:  10/15/18 1816    Lab Status:  Final result Specimen:  Blood from Arm, Right Updated:  10/15/18 1838     Sodium 138 mmol/L Potassium 3 8 mmol/L      Chloride 103 mmol/L      CO2 31 mmol/L      ANION GAP 4 mmol/L      BUN 10 mg/dL      Creatinine 0 80 mg/dL      Glucose 106 mg/dL      Calcium 8 8 mg/dL      AST 11 U/L      ALT 27 U/L      Alkaline Phosphatase 71 U/L      Total Protein 7 6 g/dL      Albumin 3 7 g/dL      Total Bilirubin 0 20 mg/dL      eGFR 94 ml/min/1 73sq m     Narrative:         National Kidney Disease Education Program recommendations are as follows:  GFR calculation is accurate only with a steady state creatinine  Chronic Kidney disease less than 60 ml/min/1 73 sq  meters  Kidney failure less than 15 ml/min/1 73 sq  meters      Lipase [[id number]]  (Normal) Collected:  10/15/18 1816    Lab Status:  Final result Specimen:  Blood from Arm, Right Updated:  10/15/18 1838     Lipase 124 u/L     Protime-INR [[id number]]  (Normal) Collected:  10/15/18 1816    Lab Status:  Final result Specimen:  Blood from Arm, Right Updated:  10/15/18 1834     Protime 12 8 seconds      INR 1 01    APTT [[id number]]  (Normal) Collected:  10/15/18 1816    Lab Status:  Final result Specimen:  Blood from Arm, Right Updated:  10/15/18 1834     PTT 30 seconds     UA w Reflex to Microscopic [[id number]]  (Abnormal) Collected:  10/15/18 1816    Lab Status:  Final result Specimen:  Urine from Urine, Clean Catch Updated:  10/15/18 1824     Color, UA Yellow     Clarity, UA Clear     Specific Gravity, UA <=1 005     pH, UA 5 5     Leukocytes, UA Negative     Nitrite, UA Negative     Protein, UA Negative mg/dl      Glucose, UA Negative mg/dl      Ketones, UA Negative mg/dl      Urobilinogen, UA 0 2 E U /dl      Bilirubin, UA Negative     Blood, UA Trace-Intact (A)    CBC and differential [[id number]] Collected:  10/15/18 1816    Lab Status:  Final result Specimen:  Blood from Arm, Right Updated:  10/15/18 1823     WBC 8 35 Thousand/uL      RBC 4 38 Million/uL      Hemoglobin 12 6 g/dL      Hematocrit 37 8 %      MCV 86 fL      MCH 28 8 pg      MCHC 33 3 g/dL RDW 12 5 %      MPV 9 2 fL      Platelets 201 Thousands/uL      nRBC 0 /100 WBCs      Neutrophils Relative 61 %      Immat GRANS % 0 %      Lymphocytes Relative 30 %      Monocytes Relative 5 %      Eosinophils Relative 3 %      Basophils Relative 1 %      Neutrophils Absolute 5 10 Thousands/µL      Immature Grans Absolute 0 03 Thousand/uL      Lymphocytes Absolute 2 51 Thousands/µL      Monocytes Absolute 0 45 Thousand/µL      Eosinophils Absolute 0 22 Thousand/µL      Basophils Absolute 0 04 Thousands/µL     POCT pregnancy, urine [[id number]]  (Normal) Resulted:  10/15/18 1816    Lab Status:  Final result Updated:  10/15/18 1817     EXT PREG TEST UR (Ref: Negative) negative                 CT abdomen pelvis with contrast   Final Result by Calvin Cárdenas MD (10/15 1939)      No source for acute abdominal pain identified  A normal appendix was visualized  There is colonic diverticulosis without diverticulitis  Workstation performed: [id number]                    Procedures  Procedures       Phone Contacts  ED Phone Contact    ED Course  ED Course as of Oct 15 1956   [facility] Oct 15, 2018   1732 Blood Pressure: 122/73   1732 Temperature: 98 5 °F (36 9 °C)   1732 Pulse: 69   1732 Respirations: 18   1732 SpO2: 99 %   1828 WBC: 8 35   1828 SL AMB HEMOGLOBIN: 12 6   1828 Platelet Count: 585   1828 Blood, UA: (!) Trace-Intact   1828 Ketones, UA: Negative   1828 SL AMB SPECIFIC GRAVITY_URINE: <=1 005   1828 PREGNANCY TEST URINE: negative   1836 Awaiting CT scan    1910 Pt at [place] Impression       No source for acute abdominal pain identified   A normal appendix was visualized  There is colonic diverticulosis without diverticulitis                                      MDM  CritCare Time    Disposition  Final diagnoses:   Abdominal pain     Time reflects when diagnosis was documented in both MDM as applicable and the Disposition within this note     Time User Action Codes Description Comment    10/15/2018 7:55 PM Amaya Sol Add [R10 9] Abdominal pain       ED Disposition     ED Disposition Condition Comment    Discharge  [address] discharge to home/self care  Condition at discharge: Good        Follow-up Information     Follow up With Specialties Details Why Contact Info Additional [address], [address], Nurse Practitioner Schedule an appointment as soon as possible for a visit  Yosef Vazquez [address] [place] [address] 145       [facility] Emergency Department Emergency Medicine Go to If symptoms worsen [address] [place] ED, [address], [address]          Patient's Medications    No medications on file     No discharge procedures on file      ED Provider  Electronically Signed by           Abdulaziz Ibarra PA-C  10/15/18 1956

## 2018-10-16 ENCOUNTER — VBI (OUTPATIENT)
Dept: ADMINISTRATIVE | Facility: OTHER | Age: 38
End: 2018-10-16

## 2018-10-16 NOTE — TELEPHONE ENCOUNTER
Alondra Hay    ED Visit Information     Ed visit date10/15/18  Diagnosis Description: Abdominal pain  In Network? Yes [employer]  Discharge status: Home  Discharged with meds ? No  Number of ED visits to date: 1  ED Severity:3     Outreach Information    Outreach successful: Yes 1  Date letter mailed:0  Date Finalized:10/16/18    Care Coordination    Follow up appointment with pcp: no in basket message  Transportation issues ? No    Value [employer] type:  3 Day Outreach  Emergent necessity warranted by diagnosis:  No  [facility] PCP:  Yes  Transportation:  Self Transport  Called PCP first?:  No  Feels able to call PCP for urgent problems ?:  Yes  Understands what emergencies can be handled by PCP ?:  Yes  Ever any problems getting appointment with PCP for minor emergency/urgency problems?:  No  Practice Contacted Patient ?:  No  Pt had ED follow up with pcp/staff ?:  No    Reason Patient went to ED instead of Urgent Care or PCP?:  Perceived Severity of Illness  Urgent care Education?:  Yes  I spoke with Gonzalo Jarquin today she stated to me she is still not feeling all that well, and still has the pain  When asked what her pain level is she stated it is a 5 - but can be as high as an 8  Gonzalo Jarquin would like a follow up with her PCP  I told her I will send an in basket message to the office   On her behalf

## 2018-10-26 ENCOUNTER — OFFICE VISIT (OUTPATIENT)
Dept: FAMILY MEDICINE CLINIC | Facility: CLINIC | Age: 38
End: 2018-10-26
Payer: COMMERCIAL

## 2018-10-26 VITALS
HEIGHT: 72 IN | DIASTOLIC BLOOD PRESSURE: 70 MMHG | WEIGHT: 293 LBS | SYSTOLIC BLOOD PRESSURE: 110 MMHG | TEMPERATURE: 97.8 F | RESPIRATION RATE: 17 BRPM | OXYGEN SATURATION: 97 % | BODY MASS INDEX: 39.68 KG/M2 | HEART RATE: 50 BPM

## 2018-10-26 DIAGNOSIS — R10.11 RIGHT UPPER QUADRANT ABDOMINAL PAIN: Primary | ICD-10-CM

## 2018-10-26 DIAGNOSIS — K57.90 DIVERTICULOSIS OF INTESTINE WITHOUT BLEEDING, UNSPECIFIED INTESTINAL TRACT LOCATION: ICD-10-CM

## 2018-10-26 PROCEDURE — 99213 OFFICE O/P EST LOW 20 MIN: CPT | Performed by: FAMILY MEDICINE

## 2018-10-26 NOTE — PROGRESS NOTES
[facility]       NAME: Daniel Waldron is a 41 y o  female  : 1980    MRN: [id number]  DATE: 2018  TIME: 7:38 AM    Assessment and Plan   Diagnoses and all orders for this visit:    Right upper quadrant abdominal pain  -     Ambulatory referral to Gastroenterology; Future    Diverticulosis of intestine without bleeding, unspecified intestinal tract location  -     Ambulatory referral to Gastroenterology; Future        No problem-specific Assessment & Plan notes found for this encounter  Patient Instructions     Discussed CT results and diverticulosis, discussed increasing fiber in her diet, adding a daily probiotic and GI referral placed to further eval abdominal pain        Chief Complaint     Chief Complaint   Patient presents with    Follow-up     ER f/u, She was in the ER for abdominal pain, She states she is constantly nauseous, feels full fast          History of Present Illness       41year old female presents at office with chief complaint of ongoing abdominal pain for the past 6 months  She was seen at [facility] ER on 10/15/18, had CT scan unremarkable for acute abdominal pain, she also had pelvic ultrasound last month which was also unremarkable  Describes this pain as cramping bloating and full feeling  She does report intermittent diarrhea since abdominal pain started  Appetite has been poor  She does report no weight loss but does report a weight gain of 10 lbs amount over the last months       Abdominal Pain   This is a chronic problem  The current episode started more than 1 month ago  The onset quality is gradual  The problem occurs intermittently  The problem has been waxing and waning  The pain is located in the RUQ  The pain is at a severity of 5/10  The pain is moderate  The quality of the pain is aching  The abdominal pain radiates to the RLQ  Associated symptoms include anorexia, diarrhea and nausea   Pertinent negatives include no arthralgias, belching, constipation, dysuria, fever, flatus, frequency, headaches, hematochezia, hematuria, melena, myalgias, vomiting or weight loss  Nothing aggravates the pain  The pain is relieved by nothing  The treatment provided no relief  Prior diagnostic workup includes CT scan and ultrasound  Review of Systems   Review of Systems   Constitutional: Negative  Negative for fever and weight loss  HENT: Negative  Eyes: Negative  Respiratory: Negative  Cardiovascular: Negative  Gastrointestinal: Positive for abdominal pain, anorexia, diarrhea and nausea  Negative for constipation, flatus, hematochezia, melena and vomiting  Endocrine: Negative  Genitourinary: Negative  Negative for dysuria, frequency and hematuria  Musculoskeletal: Negative  Negative for arthralgias and myalgias  Skin: Negative  Allergic/Immunologic: Negative  Neurological: Negative  Negative for headaches  Hematological: Negative  Psychiatric/Behavioral: Negative  All other systems reviewed and are negative  Current Medications     No current outpatient prescriptions on file      Current Allergies     Allergies as of 10/26/2018 - Reviewed 10/26/2018   Allergen Reaction Noted    Amoxicillin Rash 2014            The following portions of the patient's history were reviewed and updated as appropriate: allergies, current medications, past family history, past medical history, past social history, past surgical history and problem list      Past Medical History:   Diagnosis Date    Asthma     last assessed 10/15/15    GERD (gastroesophageal reflux disease)     last assessed 10/15/15       Past Surgical History:   Procedure Laterality Date     SECTION      last assessed 14    CHOLECYSTECTOMY      last assessed 14    DILATION AND CURETTAGE OF UTERUS      DILATION AND CURETTAGE OF UTERUS      OTHER SURGICAL HISTORY Right     right fallopian tube removed    [place] LAP,FULGURATE/EXCISE LESIONS Right 11/9/2017    Procedure: LAPAROSCOPIC OVARIAN CYSTECTOMY VERSUS OOPHERECTOMY;  Surgeon: Ld Oliver MD;  [facility];  Service: Gynecology    [place] REVISE MEDIAN N/CARPAL TUNNEL SURG Right 3/14/2018    Procedure: RELEASE CARPAL TUNNEL;  Surgeon: Cierra Weinstein MD;  [facility];  Service: Orthopedics    SALPINGECTOMY      right side    TONSILLECTOMY      last assessed 07/17/14       Family History   Problem Relation Age of Onset    Arthritis Mother     Atrial fibrillation Mother     COPD Mother     Hypertension Mother     Other Mother         urinary incontinence    Depression Father     Anxiety disorder Father     Arthritis Father     Diabetes Father     Hypertension Father     Heart disease Father     Hyperlipidemia Father     Hydrocephalus Father     Urinary tract infection Father     Hodgkin's lymphoma Brother         and non hodgkin's-per allscripts    Stomach cancer Maternal Aunt    [employer] Migraines Daughter     Other Daughter         neuroblastoma    Jaundice Daughter     Lung disease Maternal Uncle         mesothelioma         Medications have been verified  Objective   /70   Pulse (!) 50   Temp 97 8 °F (36 6 °C)   Resp 17   Ht 6' (1 829 m)   Wt (!) 140 kg (309 lb)   SpO2 97%   BMI 41 91 kg/m²        Physical Exam     Physical Exam   Constitutional: She is oriented to person, place, and time  Vital signs are normal  She appears well-developed  HENT:   Head: Normocephalic and atraumatic  Right Ear: External ear normal    Left Ear: External ear normal    Nose: Nose normal    Mouth/Throat: Oropharynx is clear and moist    Eyes: Pupils are equal, round, and reactive to light  Conjunctivae and EOM are normal  Lids are everted and swept, no foreign bodies found  Neck: Normal range of motion  Neck supple  Cardiovascular: Normal rate, regular rhythm, normal heart sounds and intact distal pulses      Pulmonary/Chest: Effort normal and breath sounds normal    Abdominal: Soft  Normal appearance and bowel sounds are normal  There is tenderness in the right upper quadrant  There is no rigidity, no rebound, no guarding, no tenderness at McBurney's point and negative Proctor's sign  Musculoskeletal: Normal range of motion  Neurological: She is alert and oriented to person, place, and time  She has normal reflexes  Skin: Skin is warm, dry and intact  Psychiatric: She has a normal mood and affect  Her speech is normal and behavior is normal  Judgment and thought content normal    Nursing note and vitals reviewed

## 2018-11-28 ENCOUNTER — ANNUAL EXAM (OUTPATIENT)
Dept: OBGYN CLINIC | Facility: CLINIC | Age: 38
End: 2018-11-28
Payer: COMMERCIAL

## 2018-11-28 ENCOUNTER — OFFICE VISIT (OUTPATIENT)
Dept: FAMILY MEDICINE CLINIC | Facility: CLINIC | Age: 38
End: 2018-11-28

## 2018-11-28 VITALS
HEART RATE: 72 BPM | RESPIRATION RATE: 17 BRPM | BODY MASS INDEX: 39.68 KG/M2 | WEIGHT: 293 LBS | DIASTOLIC BLOOD PRESSURE: 82 MMHG | TEMPERATURE: 97.5 F | HEIGHT: 72 IN | OXYGEN SATURATION: 98 % | SYSTOLIC BLOOD PRESSURE: 114 MMHG

## 2018-11-28 VITALS — DIASTOLIC BLOOD PRESSURE: 74 MMHG | BODY MASS INDEX: 42.91 KG/M2 | WEIGHT: 293 LBS | SYSTOLIC BLOOD PRESSURE: 120 MMHG

## 2018-11-28 DIAGNOSIS — R53.83 FATIGUE, UNSPECIFIED TYPE: Primary | ICD-10-CM

## 2018-11-28 DIAGNOSIS — Z30.432 ENCOUNTER FOR IUD REMOVAL: ICD-10-CM

## 2018-11-28 DIAGNOSIS — Z01.419 ENCOUNTER FOR GYNECOLOGICAL EXAMINATION (GENERAL) (ROUTINE) WITHOUT ABNORMAL FINDINGS: Primary | ICD-10-CM

## 2018-11-28 DIAGNOSIS — Z13.220 SCREENING, LIPID: ICD-10-CM

## 2018-11-28 PROCEDURE — S0612 ANNUAL GYNECOLOGICAL EXAMINA: HCPCS | Performed by: PHYSICIAN ASSISTANT

## 2018-11-28 PROCEDURE — 58301 REMOVE INTRAUTERINE DEVICE: CPT | Performed by: PHYSICIAN ASSISTANT

## 2018-11-28 NOTE — ASSESSMENT & PLAN NOTE
IUD removed without complication, discussed other contraception options, patient declines  Wants BTL but wants to discuss with  first so will use condoms until then

## 2018-11-28 NOTE — PROGRESS NOTES
Assessment/Plan  Problem List Items Addressed This Visit     Encounter for gynecological examination (general) (routine) without abnormal findings - Primary     Annual exam performed, pt declined STD testing  RTO 1 year         Encounter for IUD removal     IUD removed without complication, discussed other contraception options, patient declines  Wants BTL but wants to discuss with  first so will use condoms until then  Relevant Orders    Iud removal        Tricia Westfall is a 41 y o  female who presents for annual GYN exam  She denies any changes in Medical, Surgical or Family  Periods are absent due to Mirena but constant brown discharge  Dysmenorrhea:none  She denies that she is sexually active  incarcerated, will be released 2019  Patient wants Mirena removed, will do condoms when active    Last Pap smear: 2017  Regular self breast exam: no    Family history of uterine or ovarian cancer: no  Family history of breast cancer: no  Family history of colon cancer: no    Menstrual History:  OB History      Para Term  AB Living    4 2 2 0 2 0    SAB TAB Ectopic Multiple Live Births    2 0 0 0 0        Obstetric Comments    Possible pregnancy         No LMP recorded  Patient has had an implant  The following portions of the patient's history were reviewed and updated as appropriate: allergies, current medications, past family history, past medical history, past social history, past surgical history and problem list     Review of Systems  Review of Systems   Constitutional: Negative for chills and fever  Respiratory: Negative for shortness of breath  Cardiovascular: Negative for chest pain  Gastrointestinal: Negative for abdominal pain  Genitourinary: Positive for vaginal discharge (brown d/c)  Negative for dysuria, pelvic pain, vaginal bleeding and vaginal pain  Negative for breast pain or lumps  Negative for stress urinary incontinence  Neurological: Negative for headaches  Objective   /74 (BP Location: Left arm)   Wt (!) 144 kg (316 lb 6 4 oz)   BMI 42 91 kg/m²       Physical Exam   Constitutional: She is oriented to person, place, and time  She appears well-developed and well-nourished  Neck: No thyromegaly present  Cardiovascular: Normal rate and regular rhythm  Pulmonary/Chest: Effort normal and breath sounds normal  Right breast exhibits no mass, no nipple discharge, no skin change and no tenderness  Left breast exhibits no mass, no nipple discharge, no skin change and no tenderness  Abdominal: Soft  There is no tenderness  There is no rebound and no guarding  Genitourinary: There is no rash or lesion on the right labia  There is no rash or lesion on the left labia  Uterus is not enlarged and not tender  Cervix exhibits no motion tenderness and no discharge  Right adnexum displays no mass and no tenderness  Left adnexum displays no mass and no tenderness  No bleeding in the vagina  No vaginal discharge found  Neurological: She is alert and oriented to person, place, and time  Psychiatric: She has a normal mood and affect  Nursing note and vitals reviewed      Iud removal  Date/Time: 11/28/2018 10:12 AM  Performed by: Matthew Sutton  Authorized by: Matthew Sutton     Consent:     Consent obtained:  Verbal    Consent given by:  Patient    Procedure risks and benefits discussed: yes      Patient questions answered: yes      Patient agrees, verbalizes understanding, and wants to proceed: yes    Procedure:     Removed with no complications: yes

## 2018-11-28 NOTE — PROGRESS NOTES
OFFICE VISIT  Kathia Zuniga 41 y o  female MRN: [id number]      Assessment / Plan:  Diagnoses and all orders for this visit:    Fatigue, unspecified type  -     TSH, 3rd generation with Free T4 reflex; Future  -     Iron; Future  -     Vitamin D 25 hydroxy; Future    Screening, lipid  -     Lipid Panel with Direct LDL reflex; Future    BMI 40 0-44 9, adult (HCC)  -     CBC and differential; Future  -     Comprehensive metabolic panel; Future  -     HEMOGLOBIN A1C W/ EAG ESTIMATION; Future  -     Vitamin D 25 hydroxy; Future      Regular exercise and physical activity may help you control your weight  Diet and exercise play an important role in controlling your weight  Exercise strengthens your heart and improves your circulation  This lowers risks of heart disease  Exercise can lower your blood sugar level and help your insulin work better  This will cut down your risk of type 2 diabetes  Exercise can improve your mood and make you feel more relaxed  This can reduce your risk of depression  Reason For Visit / Chief Complaint  Chief Complaint   Patient presents with    Follow-up        HPI:  Kathia Zuniga is a 41 y o  female who presents today for follow up from one month ago  She was seen on 10/15 for abd pain, ct scan at that time colonic diverticulosis  She was recommended to see GI at her follow up, in which she has not made an appt at that time  She complaints of fatigue, sleeping 5 to 6 hours a night       Historical Information   Past Medical History:   Diagnosis Date    Asthma     last assessed 10/15/15    GERD (gastroesophageal reflux disease)     last assessed 10/15/15     Past Surgical History:   Procedure Laterality Date     SECTION      last assessed 14    CHOLECYSTECTOMY      last assessed 14    DILATION AND CURETTAGE OF UTERUS      DILATION AND CURETTAGE OF UTERUS      OTHER SURGICAL HISTORY Right     right fallopian tube removed    [place] LAP,FULGURATE/EXCISE LESIONS Right 11/9/2017    Procedure: LAPAROSCOPIC OVARIAN CYSTECTOMY VERSUS OOPHERECTOMY;  Surgeon: Verma Spurling, MD;  [facility];  Service: Gynecology    MI REVISE MEDIAN N/CARPAL TUNNEL SURG Right 3/14/2018    Procedure: RELEASE CARPAL TUNNEL;  Surgeon: Priscilla Opitz, MD;  [facility];  Service: Orthopedics    SALPINGECTOMY      right side    TONSILLECTOMY      last assessed 07/17/14     Social History   History   Alcohol Use    Yes     Comment: rarely; social     History   Drug Use No     History   Smoking Status    Former Smoker    Packs/day: 0 50    Years: 20 00    Types: Cigarettes    Quit date: 2015   Smokeless Tobacco    Never Used     Comment: per allscripts; never a smoker     Family History   Problem Relation Age of Onset    Arthritis Mother     Atrial fibrillation Mother    Beata Melgar COPD Mother     Hypertension Mother     Other Mother         urinary incontinence    Depression Father     Anxiety disorder Father     Arthritis Father     Diabetes Father     Hypertension Father     Heart disease Father     Hyperlipidemia Father     Hydrocephalus Father     Urinary tract infection Father     Hodgkin's lymphoma Brother         and non hodgkin's-per allscripts    Stomach cancer Maternal Aunt    Beata Melgra Migraines Daughter     Other Daughter         neuroblastoma    Jaundice Daughter     Lung disease Maternal Uncle         mesothelioma       Meds/Allergies   Allergies   Allergen Reactions    Amoxicillin Rash       Meds:  No current outpatient prescriptions on file  REVIEW OF SYSTEMS  Review of Systems   Constitutional: Negative for chills, fatigue and fever  HENT: Negative for congestion, ear discharge, ear pain, sore throat, trouble swallowing and voice change  Eyes: Negative for pain and redness  Respiratory: Negative for cough, chest tightness, shortness of breath and wheezing      Gastrointestinal: Negative for abdominal pain, blood in stool, constipation, diarrhea, nausea and vomiting  Endocrine: Negative for cold intolerance, heat intolerance, polydipsia, polyphagia and polyuria  Genitourinary: Negative for decreased urine volume, dysuria, frequency and urgency  Musculoskeletal: Negative for arthralgias, back pain, myalgias and neck pain  Skin: Negative for color change and rash  Neurological: Negative for dizziness, syncope, weakness, light-headedness, numbness and headaches  Psychiatric/Behavioral: Negative for sleep disturbance and suicidal ideas  The patient is not nervous/anxious  Current Vitals:   Blood Pressure: 114/82 (11/28/18 0739)  Pulse: 72 (11/28/18 0739)  Temperature: 97 5 °F (36 4 °C) (11/28/18 0739)  Respirations: 17 (11/28/18 0739)  Height: 6' (182 9 cm) (11/28/18 0739)  Weight - Scale: (!) 143 kg (316 lb) (11/28/18 0739)  SpO2: 98 % (11/28/18 0739)  [unfilled]    PHYSICAL EXAMS:  Physical Exam   Constitutional: She is oriented to person, place, and time  She appears well-developed and well-nourished  HENT:   Head: Normocephalic  Right Ear: External ear normal    Left Ear: External ear normal    Mouth/Throat: Oropharynx is clear and moist    Eyes: Pupils are equal, round, and reactive to light  Conjunctivae are normal    Neck: Neck supple  Cardiovascular: Normal rate and regular rhythm  Pulmonary/Chest: Effort normal and breath sounds normal    Abdominal: Soft  Bowel sounds are normal  She exhibits no distension  There is no tenderness  Musculoskeletal: Normal range of motion  Neurological: She is alert and oriented to person, place, and time  Skin: Skin is warm and dry  Psychiatric: She has a normal mood and affect  Follow up at this office in 6 months     Counseling / Coordination of Care  Total floor / unit time spent today 20 minutes  Greater than 50% of total time was spent with the patient and / or family counseling and / or coordination of care

## 2018-12-03 ENCOUNTER — TELEPHONE (OUTPATIENT)
Dept: OBGYN CLINIC | Facility: CLINIC | Age: 38
End: 2018-12-03

## 2018-12-03 DIAGNOSIS — N92.1 MENORRHAGIA WITH IRREGULAR CYCLE: Primary | ICD-10-CM

## 2018-12-03 NOTE — TELEPHONE ENCOUNTER
They were overnight pads  Pt has no dizziness  No lightheadedness  I entered cbc and tsh If pt continues to bleed heavily - will go for labs   I had told her probably hormonal withdrawl

## 2018-12-03 NOTE — TELEPHONE ENCOUNTER
Were they overnight pads? Any dizziness or lightheadedness   Likely normal but if sounds very heavy can order CBC and TSH

## 2018-12-03 NOTE — TELEPHONE ENCOUNTER
Pt had her mirena removed last week and has been having a heavy period with clots  Is this to be expected?

## 2018-12-03 NOTE — TELEPHONE ENCOUNTER
Pt had iud removed last Wed  (Mirena) Said she was changing pads every hr the ff day  She is still bleeding very heavily  Used at least 10 pads yesterday  Said clots and cramps awful    I know bleeding ff hormonal iud - but this sounds like a lot

## 2018-12-05 ENCOUNTER — APPOINTMENT (OUTPATIENT)
Dept: LAB | Facility: CLINIC | Age: 38
End: 2018-12-05
Payer: COMMERCIAL

## 2018-12-05 ENCOUNTER — TRANSCRIBE ORDERS (OUTPATIENT)
Dept: LAB | Facility: CLINIC | Age: 38
End: 2018-12-05

## 2018-12-05 DIAGNOSIS — Z13.220 SCREENING, LIPID: ICD-10-CM

## 2018-12-05 DIAGNOSIS — R53.83 FATIGUE, UNSPECIFIED TYPE: ICD-10-CM

## 2018-12-05 DIAGNOSIS — N92.1 MENORRHAGIA WITH IRREGULAR CYCLE: ICD-10-CM

## 2018-12-05 LAB
25(OH)D3 SERPL-MCNC: 9.3 NG/ML (ref 30–100)
ALBUMIN SERPL BCP-MCNC: 3.5 G/DL (ref 3.5–5)
ALP SERPL-CCNC: 71 U/L (ref 46–116)
ALT SERPL W P-5'-P-CCNC: 25 U/L (ref 12–78)
ANION GAP SERPL CALCULATED.3IONS-SCNC: 6 MMOL/L (ref 4–13)
AST SERPL W P-5'-P-CCNC: 11 U/L (ref 5–45)
BASOPHILS # BLD AUTO: 0.05 THOUSANDS/ΜL (ref 0–0.1)
BASOPHILS NFR BLD AUTO: 1 % (ref 0–1)
BILIRUB SERPL-MCNC: 0.28 MG/DL (ref 0.2–1)
BUN SERPL-MCNC: 12 MG/DL (ref 5–25)
CALCIUM SERPL-MCNC: 8.2 MG/DL (ref 8.3–10.1)
CHLORIDE SERPL-SCNC: 105 MMOL/L (ref 100–108)
CHOLEST SERPL-MCNC: 208 MG/DL (ref 50–200)
CO2 SERPL-SCNC: 25 MMOL/L (ref 21–32)
CREAT SERPL-MCNC: 0.74 MG/DL (ref 0.6–1.3)
EOSINOPHIL # BLD AUTO: 0.22 THOUSAND/ΜL (ref 0–0.61)
EOSINOPHIL NFR BLD AUTO: 3 % (ref 0–6)
ERYTHROCYTE [DISTWIDTH] IN BLOOD BY AUTOMATED COUNT: 12.6 % (ref 11.6–15.1)
GFR SERPL CREATININE-BSD FRML MDRD: 104 ML/MIN/1.73SQ M
GLUCOSE P FAST SERPL-MCNC: 110 MG/DL (ref 65–99)
HCT VFR BLD AUTO: 36.7 % (ref 34.8–46.1)
HDLC SERPL-MCNC: 38 MG/DL (ref 40–60)
HGB BLD-MCNC: 12.1 G/DL (ref 11.5–15.4)
IMM GRANULOCYTES # BLD AUTO: 0.01 THOUSAND/UL (ref 0–0.2)
IMM GRANULOCYTES NFR BLD AUTO: 0 % (ref 0–2)
IRON SERPL-MCNC: 50 UG/DL (ref 50–170)
LDLC SERPL CALC-MCNC: 135 MG/DL (ref 0–100)
LYMPHOCYTES # BLD AUTO: 2.04 THOUSANDS/ΜL (ref 0.6–4.47)
LYMPHOCYTES NFR BLD AUTO: 30 % (ref 14–44)
MCH RBC QN AUTO: 28.8 PG (ref 26.8–34.3)
MCHC RBC AUTO-ENTMCNC: 33 G/DL (ref 31.4–37.4)
MCV RBC AUTO: 87 FL (ref 82–98)
MONOCYTES # BLD AUTO: 0.33 THOUSAND/ΜL (ref 0.17–1.22)
MONOCYTES NFR BLD AUTO: 5 % (ref 4–12)
NEUTROPHILS # BLD AUTO: 4.05 THOUSANDS/ΜL (ref 1.85–7.62)
NEUTS SEG NFR BLD AUTO: 61 % (ref 43–75)
NRBC BLD AUTO-RTO: 0 /100 WBCS
PLATELET # BLD AUTO: 330 THOUSANDS/UL (ref 149–390)
PMV BLD AUTO: 10 FL (ref 8.9–12.7)
POTASSIUM SERPL-SCNC: 3.8 MMOL/L (ref 3.5–5.3)
PROT SERPL-MCNC: 7.3 G/DL (ref 6.4–8.2)
RBC # BLD AUTO: 4.2 MILLION/UL (ref 3.81–5.12)
SODIUM SERPL-SCNC: 136 MMOL/L (ref 136–145)
TRIGL SERPL-MCNC: 174 MG/DL
TSH SERPL DL<=0.05 MIU/L-ACNC: 2.6 UIU/ML (ref 0.36–3.74)
WBC # BLD AUTO: 6.7 THOUSAND/UL (ref 4.31–10.16)

## 2018-12-05 PROCEDURE — 80061 LIPID PANEL: CPT

## 2018-12-05 PROCEDURE — 84443 ASSAY THYROID STIM HORMONE: CPT

## 2018-12-05 PROCEDURE — 80053 COMPREHEN METABOLIC PANEL: CPT

## 2018-12-05 PROCEDURE — 83036 HEMOGLOBIN GLYCOSYLATED A1C: CPT

## 2018-12-05 PROCEDURE — 82306 VITAMIN D 25 HYDROXY: CPT

## 2018-12-05 PROCEDURE — 83540 ASSAY OF IRON: CPT

## 2018-12-05 PROCEDURE — 85025 COMPLETE CBC W/AUTO DIFF WBC: CPT

## 2018-12-05 PROCEDURE — 36415 COLL VENOUS BLD VENIPUNCTURE: CPT

## 2018-12-06 DIAGNOSIS — E55.9 VITAMIN D DEFICIENCY: Primary | ICD-10-CM

## 2018-12-06 LAB
EST. AVERAGE GLUCOSE BLD GHB EST-MCNC: 126 MG/DL
HBA1C MFR BLD: 6 % (ref 4.2–6.3)

## 2018-12-06 RX ORDER — ERGOCALCIFEROL 1.25 MG/1
50000 CAPSULE ORAL WEEKLY
Qty: 12 CAPSULE | Refills: 0 | Status: SHIPPED | OUTPATIENT
Start: 2018-12-06 | End: 2019-02-25 | Stop reason: SDUPTHER

## 2018-12-07 ENCOUNTER — TELEPHONE (OUTPATIENT)
Dept: FAMILY MEDICINE CLINIC | Facility: CLINIC | Age: 38
End: 2018-12-07

## 2018-12-07 NOTE — TELEPHONE ENCOUNTER
Patient is aware     ----- Message from [address] sent at 12/6/2018  7:45 AM EST -----  Please let her know I reviewed her labs, cholesterol elevated, lifestyle changes advised     Vit d low- medication sent to [facility]  increased vit d enriched foods into diet    ----- Message -----  From: Lab, Background User  Sent: 12/5/2018   5:03 PM  To: [address]

## 2019-02-25 DIAGNOSIS — E55.9 VITAMIN D DEFICIENCY: ICD-10-CM

## 2019-02-27 RX ORDER — ERGOCALCIFEROL 1.25 MG/1
CAPSULE ORAL
Qty: 12 CAPSULE | Refills: 0 | Status: SHIPPED | OUTPATIENT
Start: 2019-02-27 | End: 2020-07-28 | Stop reason: SDUPTHER

## 2019-07-01 ENCOUNTER — OFFICE VISIT (OUTPATIENT)
Dept: URGENT CARE | Facility: CLINIC | Age: 39
End: 2019-07-01

## 2019-07-01 VITALS
WEIGHT: 288 LBS | HEART RATE: 65 BPM | HEIGHT: 72 IN | SYSTOLIC BLOOD PRESSURE: 109 MMHG | BODY MASS INDEX: 39.01 KG/M2 | DIASTOLIC BLOOD PRESSURE: 60 MMHG | RESPIRATION RATE: 20 BRPM | OXYGEN SATURATION: 99 % | TEMPERATURE: 98 F

## 2019-07-01 DIAGNOSIS — O36.8190 DECREASED FETAL MOVEMENT DURING PREGNANCY, ANTEPARTUM, SINGLE OR UNSPECIFIED FETUS: Primary | ICD-10-CM

## 2019-07-01 RX ORDER — ASPIRIN 81 MG/1
81 TABLET, CHEWABLE ORAL DAILY
COMMUNITY
End: 2021-04-16 | Stop reason: ALTCHOICE

## 2019-07-01 RX ORDER — ALBUTEROL SULFATE 90 UG/1
2 AEROSOL, METERED RESPIRATORY (INHALATION) EVERY 6 HOURS PRN
COMMUNITY
End: 2021-10-26 | Stop reason: SDUPTHER

## 2019-07-01 RX ORDER — MELATONIN
1000 DAILY
COMMUNITY
End: 2021-04-16 | Stop reason: ALTCHOICE

## 2019-07-01 NOTE — PROGRESS NOTES
[facility]        NAME: Aundrea Diallo is a 46 y o  female  : 1980    MRN: [id number]  DATE: 2019  TIME: 6:01 PM    Assessment and Plan   Decreased fetal movement during pregnancy, antepartum, single or unspecified fetus [O36 [id number]]  1  Decreased fetal movement during pregnancy, antepartum, single or unspecified fetus  Transfer to other facility         Patient Instructions       Go directly to the emergency room for further evaluation  Chief Complaint     Chief Complaint   Patient presents with    Possible UTI     pressure with voiding, denies burning    Decreased Fetal Movement     , spotting started yesterday         History of Present Illness       Patient started with spotting yesterday inability to void and decreased fetal movement today  She called her OB referred her to urgent care  Review of Systems   Review of Systems   Genitourinary: Positive for difficulty urinating  Negative for dysuria           Current Medications       Current Outpatient Medications:     albuterol (PROVENTIL HFA,VENTOLIN HFA) 90 mcg/act inhaler, Inhale 2 puffs every 6 (six) hours as needed for wheezing, Disp: , Rfl:     aspirin 81 mg chewable tablet, Chew 81 mg daily, Disp: , Rfl:     cholecalciferol (VITAMIN D3) 1,000 units tablet, Take 1,000 Units by mouth daily, Disp: , Rfl:     Insulin Glargine (Luba Gin SC), Inject under the skin, Disp: , Rfl:     Prenatal Vit-Fe Fumarate-FA (PRENATAL 1+1 PO), Take by mouth, Disp: , Rfl:     ergocalciferol (VITAMIN D2) 50,000 units, TAKE ONE CAPSULE BY MOUTH ONE TIME PER WEEK (Patient not taking: Reported on 2019), Disp: 12 capsule, Rfl: 0    Current Allergies     Allergies as of 2019 - Reviewed 2019   Allergen Reaction Noted    Amoxicillin Rash 2014            The following portions of the patient's history were reviewed and updated as appropriate: allergies, current medications, past family history, past medical history, past social history, past surgical history and problem list      Past Medical History:   Diagnosis Date    Asthma     last assessed 10/15/15    Diabetes mellitus ([facility] [address] )     GERD (gastroesophageal reflux disease)     last assessed 10/15/15    Gestational diabetes        Past Surgical History:   Procedure Laterality Date     SECTION      last assessed 14    CHOLECYSTECTOMY      last assessed 14    DILATION AND CURETTAGE OF UTERUS      DILATION AND CURETTAGE OF UTERUS      OTHER SURGICAL HISTORY Right     right fallopian tube removed    [place] LAP,FULGURATE/EXCISE LESIONS Right 2017    Procedure: LAPAROSCOPIC OVARIAN CYSTECTOMY VERSUS OOPHERECTOMY;  Surgeon: Vicky Dejesus MD;  [facility];  Service: Gynecology    ID REVISE MEDIAN N/CARPAL TUNNEL SURG Right 3/14/2018    Procedure: RELEASE CARPAL TUNNEL;  Surgeon: Silvana Galvan MD;  [facility];  Service: Orthopedics    SALPINGECTOMY      right side    TONSILLECTOMY      last assessed 14       Family History   Problem Relation Age of Onset    Arthritis Mother     Atrial fibrillation Mother     COPD Mother     Hypertension Mother     Other Mother         urinary incontinence    Depression Father     Anxiety disorder Father     Arthritis Father     Diabetes Father     Hypertension Father     Heart disease Father     Hyperlipidemia Father     Hydrocephalus Father     Urinary tract infection Father     Hodgkin's lymphoma Brother         and non hodgkin's-per allscripts    Stomach cancer Maternal Aunt    Omer Rosario Migraines Daughter     Other Daughter         neuroblastoma    Jaundice Daughter     Lung disease Maternal Uncle         mesothelioma         Medications have been verified          Objective   /60   Pulse 65   Temp 98 °F (36 7 °C) (Tympanic)   Resp 20   Ht 6' (1 829 m)   Wt 131 kg (288 lb)   SpO2 99%   BMI 39 06 kg/m²        Physical Exam     Physical Exam

## 2019-08-21 ENCOUNTER — OFFICE VISIT (OUTPATIENT)
Dept: URGENT CARE | Facility: CLINIC | Age: 39
End: 2019-08-21
Payer: COMMERCIAL

## 2019-08-21 VITALS
OXYGEN SATURATION: 97 % | SYSTOLIC BLOOD PRESSURE: 119 MMHG | HEART RATE: 82 BPM | TEMPERATURE: 97.6 F | DIASTOLIC BLOOD PRESSURE: 62 MMHG | RESPIRATION RATE: 18 BRPM

## 2019-08-21 DIAGNOSIS — L30.9 ECZEMA, UNSPECIFIED TYPE: Primary | ICD-10-CM

## 2019-08-21 PROCEDURE — 99284 EMERGENCY DEPT VISIT MOD MDM: CPT | Performed by: NURSE PRACTITIONER

## 2019-08-21 PROCEDURE — 99204 OFFICE O/P NEW MOD 45 MIN: CPT | Performed by: NURSE PRACTITIONER

## 2019-08-21 PROCEDURE — G0383 LEV 4 HOSP TYPE B ED VISIT: HCPCS | Performed by: NURSE PRACTITIONER

## 2019-08-21 RX ORDER — MOMETASONE FUROATE 1 MG/ML
SOLUTION TOPICAL DAILY
Qty: 60 ML | Refills: 0 | Status: SHIPPED | OUTPATIENT
Start: 2019-08-21 | End: 2021-04-29 | Stop reason: ALTCHOICE

## 2019-08-21 NOTE — PATIENT INSTRUCTIONS
You have been prescribed elocon cream for eczema - use only as directed for a short term  You are to follow up with your PCP    Eczema   WHAT YOU NEED TO KNOW:   Eczema, or atopic dermatitis, is an itchy, red skin rash  It is a long-term condition that may cause flare-ups for the rest of your life  DISCHARGE INSTRUCTIONS:   Return to the emergency department if:   · You develop a fever or have red streaks going up your arm or leg  · Your rash gets more swollen, red, or hot  Contact your healthcare provider if:   · Most of your skin is red, swollen, painful, and covered with scales  · You develop bloody, red, painful crusts  · Your skin blisters and oozes white or yellow pus  · You have questions about your condition or care  Medicines:   · Medicines , such as immunosuppressants, help reduce itching, redness, pain, and swelling  They may be given as a cream or pill  You may also receive antihistamines to reduce itching, or antibiotics if you have a skin infection  · Take your medicine as directed  Contact your healthcare provider if you think your medicine is not helping or if you have side effects  Tell him of her if you are allergic to any medicine  Keep a list of the medicines, vitamins, and herbs you take  Include the amounts, and when and why you take them  Bring the list or the pill bottles to follow-up visits  Carry your medicine list with you in case of an emergency  Manage eczema:   · Do not scratch  Pat or press on your skin for relief from itching  Your symptoms will get worse if you scratch  Keep your fingernails short so you do not tear your skin if you do scratch  · Keep your skin moist   Rub lotion, cream or ointment into your skin right after a bath or shower when your skin is still damp  Ask your healthcare provider what to use and how often to use it  · Take baths or showers  with warm water for 10 minutes or less  Use mild bar soap   Ask your healthcare provider for the best soap for you to use  · Wear cotton clothes  Wear loose-fitting clothes made from cotton or cotton blends  Avoid wool  · Use a humidifier  to add moisture to the air in your home  · Avoid changes in temperature , especially activities that cause you to sweat a lot because this can cause itching  Remove blankets from your bed if you get hot while you sleep  · Avoid allergens, dust, and skin irritants  Do not let pets inside your home  Do not use perfume, fabric softener, or makeup that burns or itches  Follow up with your healthcare provider as directed:  Write down your questions so you remember to ask them during your visits  © 2017 [facility] Information is for End User's use only and may not be sold, redistributed or otherwise used for commercial purposes  All illustrations and images included in CareNotes® are the copyrighted property of [employer]  or [employer]  The above information is an  only  It is not intended as medical advice for individual conditions or treatments  Talk to your doctor, nurse or pharmacist before following any medical regimen to see if it is safe and effective for you

## 2019-08-21 NOTE — PROGRESS NOTES
[facility]        NAME: Gini Neumann is a 46 y o  female  : 1980    MRN: [id number]  DATE: 2019  TIME: 6:32 PM    Assessment and Plan   Eczema, unspecified type [L30 9]  1  Eczema, unspecified type  mometasone (ELOCON) 0 1 % lotion         Patient Instructions       Follow up with PCP in 3-5 days  Proceed to  ER if symptoms worsen  You have been prescribed elocon cream for eczema - use only as directed for a short term  You are to follow up with your PCP        Chief Complaint     Chief Complaint   Patient presents with    Rash     Pt c/o a rash on bilateral hands for a month  History of Present Illness       This is a 46year old 28 week pregnant female who has a PMH of eczema and states that for the last few days her hands are peeling, open and sore  She states she has been using OTC products w/o relief  She called her PCP and they are on vacation and was told to come to   She states her OB would not prescribe anything for her  Rash         Review of Systems   Review of Systems   Skin: Positive for rash and wound  All other systems reviewed and are negative          Current Medications       Current Outpatient Medications:     albuterol (PROVENTIL HFA,VENTOLIN HFA) 90 mcg/act inhaler, Inhale 2 puffs every 6 (six) hours as needed for wheezing, Disp: , Rfl:     aspirin 81 mg chewable tablet, Chew 81 mg daily, Disp: , Rfl:     cholecalciferol (VITAMIN D3) 1,000 units tablet, Take 1,000 Units by mouth daily, Disp: , Rfl:     ergocalciferol (VITAMIN D2) 50,000 units, TAKE ONE CAPSULE BY MOUTH ONE TIME PER WEEK (Patient not taking: Reported on 2019), Disp: 12 capsule, Rfl: 0    Insulin Glargine (BASAGLAR KWIKPEN SC), Inject under the skin, Disp: , Rfl:     mometasone (ELOCON) 0 1 % lotion, Apply topically daily Use for short duration only, Disp: 60 mL, Rfl: 0    Prenatal Vit-Fe Fumarate-FA (PRENATAL 1+1 PO), Take by mouth, Disp: , Rfl:     Current Allergies Allergies as of 2019 - Reviewed 2019   Allergen Reaction Noted    Amoxicillin Rash 2014            The following portions of the patient's history were reviewed and updated as appropriate: allergies, current medications, past family history, past medical history, past social history, past surgical history and problem list      Past Medical History:   Diagnosis Date    Asthma     last assessed 10/15/15    Diabetes mellitus ([address] )     GERD (gastroesophageal reflux disease)     last assessed 10/15/15    Gestational diabetes        Past Surgical History:   Procedure Laterality Date     SECTION      last assessed 14    CHOLECYSTECTOMY      last assessed 14    DILATION AND CURETTAGE OF UTERUS      DILATION AND CURETTAGE OF UTERUS      OTHER SURGICAL HISTORY Right     right fallopian tube removed    MI LAP,FULGURATE/EXCISE LESIONS Right 2017    Procedure: LAPAROSCOPIC OVARIAN CYSTECTOMY VERSUS OOPHERECTOMY;  Surgeon: Duane Meckel, MD;  [facility];  Service: Gynecology    MI REVISE MEDIAN N/CARPAL TUNNEL SURG Right 3/14/2018    Procedure: RELEASE CARPAL TUNNEL;  Surgeon: Josefina Michel MD;  [facility];  Service: Orthopedics    SALPINGECTOMY      right side    TONSILLECTOMY      last assessed 14       Family History   Problem Relation Age of Onset    Arthritis Mother     Atrial fibrillation Mother     COPD Mother     Hypertension Mother     Other Mother         urinary incontinence    Depression Father     Anxiety disorder Father     Arthritis Father     Diabetes Father     Hypertension Father     Heart disease Father     Hyperlipidemia Father     Hydrocephalus Father     Urinary tract infection Father     Hodgkin's lymphoma Brother         and non hodgkin's-per allscripts    Stomach cancer Maternal Aunt    Delfina Portillo Migraines Daughter     Other Daughter         neuroblastoma    Jaundice Daughter     Lung disease Maternal Uncle         mesothelioma         Medications have been verified  Objective   /62   Pulse 82   Temp 97 6 °F (36 4 °C)   Resp 18   SpO2 97%        Physical Exam     Physical Exam   Constitutional: She is oriented to person, place, and time  She appears well-developed and well-nourished  No distress  HENT:   Head: Normocephalic and atraumatic  Eyes: Pupils are equal, round, and reactive to light  EOM are normal    Neck: Normal range of motion  Neck supple  Musculoskeletal: Normal range of motion  Neurological: She is alert and oriented to person, place, and time  Skin: Skin is warm and dry  Capillary refill takes less than 2 seconds  Rash noted  She is not diaphoretic  There is erythema  Stage I skin ulcerations to mcgowan surface of hands with dry cracking peeling skin  Knuckles and dorsal surface of hands are dry and cracking as well  Psychiatric: She has a normal mood and affect  Her behavior is normal  Judgment and thought content normal    Nursing note and vitals reviewed  Discussed treatment options with pharmacist at [facility]   Elocon safe in pregnancy for short term use

## 2019-10-07 PROBLEM — R73.03 PREDIABETES: Status: ACTIVE | Noted: 2019-10-07

## 2020-02-10 ENCOUNTER — OFFICE VISIT (OUTPATIENT)
Dept: URGENT CARE | Facility: CLINIC | Age: 40
End: 2020-02-10
Payer: COMMERCIAL

## 2020-02-10 VITALS
HEART RATE: 61 BPM | WEIGHT: 267.6 LBS | OXYGEN SATURATION: 97 % | RESPIRATION RATE: 18 BRPM | DIASTOLIC BLOOD PRESSURE: 70 MMHG | SYSTOLIC BLOOD PRESSURE: 105 MMHG | HEIGHT: 72 IN | TEMPERATURE: 98.5 F | BODY MASS INDEX: 36.24 KG/M2

## 2020-02-10 DIAGNOSIS — B02.9 HERPES ZOSTER WITHOUT COMPLICATION: Primary | ICD-10-CM

## 2020-02-10 PROCEDURE — 99283 EMERGENCY DEPT VISIT LOW MDM: CPT | Performed by: PHYSICIAN ASSISTANT

## 2020-02-10 PROCEDURE — 99203 OFFICE O/P NEW LOW 30 MIN: CPT | Performed by: PHYSICIAN ASSISTANT

## 2020-02-10 PROCEDURE — G0382 LEV 3 HOSP TYPE B ED VISIT: HCPCS | Performed by: PHYSICIAN ASSISTANT

## 2020-02-10 RX ORDER — VALACYCLOVIR HYDROCHLORIDE 1 G/1
1000 TABLET, FILM COATED ORAL 3 TIMES DAILY
Qty: 21 TABLET | Refills: 0 | Status: SHIPPED | OUTPATIENT
Start: 2020-02-10 | End: 2020-08-17

## 2020-02-10 NOTE — PROGRESS NOTES
[facility]        NAME: Ken Heart is a 46 y o  female  : 1980    MRN: [id number]  DATE: February 10, 2020  TIME: 10:18 AM    Assessment and Plan   Herpes zoster without complication [O83 0]  1  Herpes zoster without complication  valACYclovir (VALTREX) 1,000 mg tablet         Patient Instructions     Start Valtrex as prescribed  Follow-up with your family doctor for the lump within the breast you may require further testing  If symptoms worsen go to the emergency room for further evaluation  Contact the child's pediatrician in regards to you taking Valtrex and breast-feeding  Follow up with PCP in 3-5 days  Proceed to  ER if symptoms worsen  Chief Complaint     Chief Complaint   Patient presents with    Breast Pain     pt states that she is having severe pain in her left breast and there is a lump  started saturday    Rash     under left breast         History of Present Illness       Patient started with a burning pressure pain beneath her left breast 2 days ago  Later that day she started with a rash beneath the left breast   The rash is still a burning discomfort which radiates along to her back  Denies any drainage, fever or chills  Patient is breast-feeding a 1month-old  Patient did have chickenpox as a child  Review of Systems   Review of Systems   Constitutional: Negative for chills and fever  HENT: Negative for sore throat  Respiratory: Negative for cough  Cardiovascular: Negative for chest pain  Gastrointestinal: Negative for nausea and vomiting  Musculoskeletal: Positive for back pain  Skin: Positive for rash  Neurological: Negative for headaches  Hematological: Negative for adenopathy           Current Medications       Current Outpatient Medications:     albuterol (PROVENTIL HFA,VENTOLIN HFA) 90 mcg/act inhaler, Inhale 2 puffs every 6 (six) hours as needed for wheezing, Disp: , Rfl:     cholecalciferol (VITAMIN D3) 1,000 units tablet, Take 1,000 Units by mouth daily, Disp: , Rfl:     Prenatal Vit-Fe Fumarate-FA (PRENATAL 1+1 PO), Take by mouth, Disp: , Rfl:     aspirin 81 mg chewable tablet, Chew 81 mg daily, Disp: , Rfl:     ergocalciferol (VITAMIN D2) 50,000 units, TAKE ONE CAPSULE BY MOUTH ONE TIME PER WEEK (Patient not taking: Reported on 2019), Disp: 12 capsule, Rfl: 0    Insulin Glargine (Emma Arredondo SC), Inject under the skin, Disp: , Rfl:     mometasone (ELOCON) 0 1 % lotion, Apply topically daily Use for short duration only (Patient not taking: Reported on 2/10/2020), Disp: 60 mL, Rfl: 0    valACYclovir (VALTREX) 1,000 mg tablet, Take 1 tablet (1,000 mg total) by mouth 3 (three) times a day for 7 days, Disp: 21 tablet, Rfl: 0    Current Allergies     Allergies as of 02/10/2020 - Reviewed 02/10/2020   Allergen Reaction Noted    Amoxicillin Rash 2014            The following portions of the patient's history were reviewed and updated as appropriate: allergies, current medications, past family history, past medical history, past social history, past surgical history and problem list      Past Medical History:   Diagnosis Date    Asthma     last assessed 10/15/15    Diabetes mellitus ([facility] [address] )     GERD (gastroesophageal reflux disease)     last assessed 10/15/15    Gestational diabetes        Past Surgical History:   Procedure Laterality Date     SECTION      last assessed 14    CHOLECYSTECTOMY      last assessed 14    DILATION AND CURETTAGE OF UTERUS      DILATION AND CURETTAGE OF UTERUS      OTHER SURGICAL HISTORY Right     right fallopian tube removed    ID LAP,FULGURATE/EXCISE LESIONS Right 2017    Procedure: LAPAROSCOPIC OVARIAN CYSTECTOMY VERSUS OOPHERECTOMY;  Surgeon: Alin Breaux MD;  [facility];  Service: Gynecology    ID REVISE MEDIAN N/CARPAL TUNNEL SURG Right 3/14/2018    Procedure: RELEASE CARPAL TUNNEL;  Surgeon: Lukasz Pang MD;  [facility];  Service: Orthopedics    SALPINGECTOMY      right side    TONSILLECTOMY      last assessed 07/17/14       Family History   Problem Relation Age of Onset    Arthritis Mother     Atrial fibrillation Mother    Virgen Gomez COPD Mother     Hypertension Mother     Other Mother         urinary incontinence    Depression Father     Anxiety disorder Father     Arthritis Father     Diabetes Father     Hypertension Father     Heart disease Father     Hyperlipidemia Father     Hydrocephalus Father     Urinary tract infection Father     Hodgkin's lymphoma Brother         and non hodgkin's-per allscripts    Stomach cancer Maternal Aunt    Virgen Gomez Migraines Daughter     Other Daughter         neuroblastoma    Jaundice Daughter     Lung disease Maternal Uncle         mesothelioma         Medications have been verified  Objective   /70   Pulse 61   Temp 98 5 °F (36 9 °C) (Tympanic)   Resp 18   Ht 6' (1 829 m)   Wt 121 kg (267 lb 9 6 oz)   LMP  (LMP Unknown)   SpO2 97%   Breastfeeding Yes   BMI 36 29 kg/m²        Physical Exam     Physical Exam   Constitutional: She is oriented to person, place, and time  She appears well-developed and well-nourished  HENT:   Head: Normocephalic and atraumatic  Neck: Normal range of motion  Cardiovascular: Normal rate and regular rhythm  Pulmonary/Chest: Effort normal    Neurological: She is alert and oriented to person, place, and time  Skin: Skin is warm and dry  Maculopapular erythematous blotchy rash beneath the left breast which appears consistent with shingles  There is also a small less than 1 cm round subcutaneous firmness in the left upper outer quadrant of the left breast which is nontender no rashes overlying this area  Psychiatric: She has a normal mood and affect  Her behavior is normal    Nursing note and vitals reviewed

## 2020-02-10 NOTE — PATIENT INSTRUCTIONS
Start Valtrex as prescribed  Follow-up with your family doctor for the lump within the breast you may require further testing  If symptoms worsen go to the emergency room for further evaluation  Contact the child's pediatrician in regards to you taking Valtrex and breast-feeding  Shingles   AMBULATORY CARE:   Shingles  is a painful rash  Shingles is caused by the same virus that causes chickenpox (varicella-zoster virus)  After you get chickenpox, the virus stays in your body for several years without causing any symptoms  Shingles occurs when the virus becomes active again  Once active, the virus will travel along a nerve to your skin and cause a rash  Common signs and symptoms include the following:  Shingles often starts with pain in the back, chest, neck, or face  A rash then develops in the same area  The rash is usually found on only one side of the body  The rash may feel itchy or painful  It starts as red dots that become blisters filled with fluid  The blisters usually grow bigger, become filled with pus, and then crust over after a few days  You may also have any of the following:  · Fatigue and muscle weakness    · Pain when your skin is lightly touched    · Headache    · Fever    · Eye pain when exposed to light  Seek care immediately if:   · You have painful, red, warm skin around the blisters, or the blisters drain pus  · Your neck is stiff or you have trouble moving it  · You have trouble moving your arms, legs, or face  · You have a seizure  · You have weakness in an arm or leg  · You become confused, or have difficulty speaking  · You have dizziness, a severe headache, or hearing or vision loss  Contact your healthcare provider if:   · You feel weak or have a headache  · You have a cough, chills, or a fever  · You have abdominal pain or nausea, or you are vomiting  · Your rash becomes more itchy or painful      · Your rash spreads to other parts of your body     · Your pain worsens and does not go away even after you take medicine  · You have questions or concerns about your condition or care  Medicines:   · Antiviral medicine  helps decrease symptoms and healing time  They may also decrease your risk of developing nerve pain  You will need to start taking them within 3 days of the start of symptoms to prevent nerve pain  · Pain medicine  may be prescribed or suggested by your healthcare provider  You may need NSAIDs, acetaminophen, or opioid medicine depending on how much pain you are in  Do not wait until the pain is severe before you take more pain medicine  · Topical anesthetics  are used to numb the skin and decrease pain  They can be a cream, gel, spray, or patch  · Anticonvulsants  decrease nerve pain and may help you sleep at night  · Antidepressants  may be used to decrease nerve pain  Follow up with your healthcare provider as directed:  Write down your questions so you remember to ask them during your visits  Self-care:  Keep your rash clean and dry  Cover your rash with a bandage or clothing  Do not use bandages that stick to your skin  The sticky part may irritate your skin and make your rash last longer  Prevent the spread of shingles: The virus can be passed to a person who has never had chickenpox  This person may get chickenpox, but not shingles  You may pass the virus to others as long as you have a rash  The virus is spread by direct contact with the fluid from the blisters  Usually, you cannot spread the virus once the blisters dry up  Prevent shingles or another shingles outbreak:  A vaccine may be given to help prevent shingles  Ask for more information about this vaccine  © 2017 [employer]  Information is for End User's use only and may not be sold, redistributed or otherwise used for commercial purposes   All illustrations and images included in CareNotes® are the copyrighted property of [employer]JI MUR[employer] Christine  or Baljinder Castle  The above information is an  only  It is not intended as medical advice for individual conditions or treatments  Talk to your doctor, nurse or pharmacist before following any medical regimen to see if it is safe and effective for you

## 2020-07-28 ENCOUNTER — OFFICE VISIT (OUTPATIENT)
Dept: URGENT CARE | Facility: CLINIC | Age: 40
End: 2020-07-28
Payer: COMMERCIAL

## 2020-07-28 VITALS
TEMPERATURE: 97.7 F | HEART RATE: 78 BPM | WEIGHT: 267 LBS | OXYGEN SATURATION: 98 % | BODY MASS INDEX: 36.16 KG/M2 | DIASTOLIC BLOOD PRESSURE: 70 MMHG | HEIGHT: 72 IN | SYSTOLIC BLOOD PRESSURE: 106 MMHG | RESPIRATION RATE: 18 BRPM

## 2020-07-28 DIAGNOSIS — J01.90 ACUTE SINUSITIS, RECURRENCE NOT SPECIFIED, UNSPECIFIED LOCATION: Primary | ICD-10-CM

## 2020-07-28 PROCEDURE — 99213 OFFICE O/P EST LOW 20 MIN: CPT | Performed by: PHYSICIAN ASSISTANT

## 2020-07-28 PROCEDURE — G0382 LEV 3 HOSP TYPE B ED VISIT: HCPCS | Performed by: PHYSICIAN ASSISTANT

## 2020-07-28 PROCEDURE — 99283 EMERGENCY DEPT VISIT LOW MDM: CPT | Performed by: PHYSICIAN ASSISTANT

## 2020-07-28 RX ORDER — PREDNISONE 10 MG/1
TABLET ORAL
Qty: 26 TABLET | Refills: 0 | Status: SHIPPED | OUTPATIENT
Start: 2020-07-28 | End: 2021-04-29 | Stop reason: ALTCHOICE

## 2020-07-28 RX ORDER — AZITHROMYCIN 250 MG/1
TABLET, FILM COATED ORAL
Qty: 6 TABLET | Refills: 0 | Status: SHIPPED | OUTPATIENT
Start: 2020-07-28 | End: 2020-08-01

## 2020-07-28 NOTE — PATIENT INSTRUCTIONS
Antibiotic and prednisone as prescribed  If not improving, follow up with PCP  If worsening, go to the ER

## 2020-07-28 NOTE — PROGRESS NOTES
[facility]    NAME: Nadia Westfall is a 45 y o  female  : 1980    MRN: [id number]  DATE: 2020  TIME: 7:31 PM    Assessment and Plan   Acute sinusitis, recurrence not specified, unspecified location [J01 90]  1  Acute sinusitis, recurrence not specified, unspecified location  azithromycin (ZITHROMAX) 250 mg tablet    predniSONE 10 mg tablet       Patient Instructions     Patient Instructions   Antibiotic and prednisone as prescribed  If not improving, follow up with PCP  If worsening, go to the ER  Chief Complaint     Chief Complaint   Patient presents with    Sinusitis     x 2 weeks    Headache       History of Present Illness   52-year-old female here with complaint of headache for the last 2 weeks  Also has some mild sinus congestion and had a bad nosebleed today  Headache is throbbing in nature on the left side of her head  Denies any visual changes  No neck pain  Has taken Tylenol and ibuprofen with minimal relief  Review of Systems   Review of Systems   Constitutional: Negative for appetite change, chills and fever  HENT: Positive for congestion  Negative for ear discharge, ear pain, facial swelling, postnasal drip, sinus pressure, sneezing and sore throat  Nose bleeding   Respiratory: Negative for cough, shortness of breath and wheezing  Neurological: Positive for headaches         Current Medications     Current Outpatient Medications:     albuterol (PROVENTIL HFA,VENTOLIN HFA) 90 mcg/act inhaler, Inhale 2 puffs every 6 (six) hours as needed for wheezing, Disp: , Rfl:     cholecalciferol (VITAMIN D3) 1,000 units tablet, Take 1,000 Units by mouth daily, Disp: , Rfl:     Prenatal Vit-Fe Fumarate-FA (PRENATAL 1+1 PO), Take by mouth, Disp: , Rfl:     aspirin 81 mg chewable tablet, Chew 81 mg daily, Disp: , Rfl:     azithromycin (ZITHROMAX) 250 mg tablet, Take 2 tablets today then 1 tablet daily x 4 days, Disp: 6 tablet, Rfl: 0    Insulin Glargine (Nate Porter SC), Inject under the skin, Disp: , Rfl:     mometasone (ELOCON) 0 1 % lotion, Apply topically daily Use for short duration only (Patient not taking: Reported on 2/10/2020), Disp: 60 mL, Rfl: 0    predniSONE 10 mg tablet, Take 3 tabs BID X 2 days, 2 tabs BID X 2 days, 1 tab BID X 2 days, 1 tab daily X 2 days, Disp: 26 tablet, Rfl: 0    valACYclovir (VALTREX) 1,000 mg tablet, Take 1 tablet (1,000 mg total) by mouth 3 (three) times a day for 7 days, Disp: 21 tablet, Rfl: 0    Current Allergies     Allergies as of 2020 - Reviewed 2020   Allergen Reaction Noted    Amoxicillin Rash 2014          The following portions of the patient's history were reviewed and updated as appropriate: allergies, current medications, past family history, past medical history, past social history, past surgical history and problem list    Past Medical History:   Diagnosis Date    Asthma     last assessed 10/15/15    Diabetes mellitus ([facility] [address] )     GERD (gastroesophageal reflux disease)     last assessed 10/15/15    Gestational diabetes      Past Surgical History:   Procedure Laterality Date     SECTION      last assessed 14    CHOLECYSTECTOMY      last assessed 14    DILATION AND CURETTAGE OF UTERUS      DILATION AND CURETTAGE OF UTERUS      OTHER SURGICAL HISTORY Right     right fallopian tube removed    ME LAP,FULGURATE/EXCISE LESIONS Right 2017    Procedure: LAPAROSCOPIC OVARIAN CYSTECTOMY VERSUS OOPHERECTOMY;  Surgeon: Brian Pa MD;  [facility];  Service: Gynecology    ME REVISE MEDIAN N/CARPAL TUNNEL SURG Right 3/14/2018    Procedure: RELEASE CARPAL TUNNEL;  Surgeon: Brooke Kahn MD;  [facility];  Service: Orthopedics    SALPINGECTOMY      right side    TONSILLECTOMY      last assessed 14     Family History   Problem Relation Age of Onset    Arthritis Mother     Atrial fibrillation Mother     COPD Mother     Hypertension Mother     Other Mother         urinary incontinence    Depression Father     Anxiety disorder Father     Arthritis Father     Diabetes Father     Hypertension Father     Heart disease Father     Hyperlipidemia Father     Hydrocephalus Father     Urinary tract infection Father     Hodgkin's lymphoma Brother         and non hodgkin's-per allscripts    Stomach cancer Maternal Aunt     Migraines Daughter     Other Daughter         neuroblastoma    Jaundice Daughter     Lung disease Maternal Uncle         mesothelioma     Social History     Socioeconomic History    Marital status: Single     Spouse name: Not on file    Number of children: Not on file    Years of education: Not on file    Highest education level: Not on file   Occupational History    Not on file   Social Needs    Financial resource strain: Not on file    Food insecurity:     Worry: Not on file     Inability: Not on file    Transportation needs:     Medical: Not on file     Non-medical: Not on file   Tobacco Use    Smoking status: Former Smoker     Packs/day: 0 50     Years: 20 00     Pack years: 10 00     Types: Cigarettes     Last attempt to quit:      Years since quittin 5    Smokeless tobacco: Never Used    Tobacco comment:    Substance and Sexual Activity    Alcohol use: Yes     Comment: rarely; social    Drug use: No    Sexual activity: Not Currently     Birth control/protection: IUD   Lifestyle    Physical activity:     Days per week: Not on file     Minutes per session: Not on file    Stress: Not on file   Relationships    Social connections:     Talks on phone: Not on file     Gets together: Not on file     Attends Hoahaoism service: Not on file     Active member of club or organization: Not on file     Attends meetings of clubs or organizations: Not on file     Relationship status: Not on file    Intimate partner violence:     Fear of current or ex partner: Not on file     Emotionally abused: Not on file     Physically abused: Not on file     Forced sexual activity: Not on file   Other Topics Concern    Not on file   Social History Narrative    Not on file     Medications have been verified  Objective   /70   Pulse 78   Temp 97 7 °F (36 5 °C)   Resp 18   Ht 6' (1 829 m)   Wt 121 kg (267 lb)   SpO2 98%   BMI 36 21 kg/m²      Physical Exam   Physical Exam   Constitutional: She is oriented to person, place, and time  She appears well-developed and well-nourished  No distress  HENT:   Head: Normocephalic and atraumatic  Right Ear: Tympanic membrane normal    Left Ear: Tympanic membrane normal    Nose: Mucosal edema present  No rhinorrhea  Right sinus exhibits no maxillary sinus tenderness and no frontal sinus tenderness  Left sinus exhibits no maxillary sinus tenderness and no frontal sinus tenderness  Mouth/Throat: Oropharynx is clear and moist  No oropharyngeal exudate, posterior oropharyngeal edema or posterior oropharyngeal erythema  Eyes: Conjunctivae are normal    Cardiovascular: Normal rate, regular rhythm and normal heart sounds  No murmur heard  Pulmonary/Chest: Effort normal and breath sounds normal    Neurological: She is alert and oriented to person, place, and time  No cranial nerve deficit  Nursing note and vitals reviewed

## 2020-08-17 ENCOUNTER — HOSPITAL ENCOUNTER (EMERGENCY)
Facility: HOSPITAL | Age: 40
Discharge: HOME | End: 2020-08-17
Attending: EMERGENCY MEDICINE | Admitting: EMERGENCY MEDICINE
Payer: COMMERCIAL

## 2020-08-17 ENCOUNTER — APPOINTMENT (EMERGENCY)
Dept: CT IMAGING | Facility: HOSPITAL | Age: 40
End: 2020-08-17
Payer: COMMERCIAL

## 2020-08-17 VITALS
OXYGEN SATURATION: 96 % | SYSTOLIC BLOOD PRESSURE: 121 MMHG | TEMPERATURE: 97.8 F | BODY MASS INDEX: 39.68 KG/M2 | HEIGHT: 72 IN | RESPIRATION RATE: 18 BRPM | HEART RATE: 64 BPM | DIASTOLIC BLOOD PRESSURE: 71 MMHG | WEIGHT: 293 LBS

## 2020-08-17 DIAGNOSIS — K57.92 DIVERTICULITIS: Primary | ICD-10-CM

## 2020-08-17 LAB
ALBUMIN SERPL BCP-MCNC: 3.5 G/DL (ref 3.5–5)
ALP SERPL-CCNC: 67 U/L (ref 46–116)
ALT SERPL W P-5'-P-CCNC: 33 U/L (ref 12–78)
ANION GAP SERPL CALCULATED.3IONS-SCNC: 6 MMOL/L (ref 4–13)
AST SERPL W P-5'-P-CCNC: 13 U/L (ref 5–45)
BACTERIA UR QL AUTO: ABNORMAL /HPF
BASOPHILS # BLD AUTO: 0.05 THOUSANDS/ΜL (ref 0–0.1)
BASOPHILS NFR BLD AUTO: 1 % (ref 0–1)
BILIRUB SERPL-MCNC: 0.4 MG/DL (ref 0.2–1)
BILIRUB UR QL STRIP: NEGATIVE
BUN SERPL-MCNC: 15 MG/DL (ref 5–25)
CALCIUM SERPL-MCNC: 8.5 MG/DL (ref 8.3–10.1)
CHLORIDE SERPL-SCNC: 105 MMOL/L (ref 100–108)
CLARITY UR: ABNORMAL
CO2 SERPL-SCNC: 28 MMOL/L (ref 21–32)
COLOR UR: ABNORMAL
CREAT SERPL-MCNC: 0.88 MG/DL (ref 0.6–1.3)
EOSINOPHIL # BLD AUTO: 0.16 THOUSAND/ΜL (ref 0–0.61)
EOSINOPHIL NFR BLD AUTO: 2 % (ref 0–6)
ERYTHROCYTE [DISTWIDTH] IN BLOOD BY AUTOMATED COUNT: 12.4 % (ref 11.6–15.1)
EXT PREG TEST URINE: NEGATIVE
EXT. CONTROL ED NAV: NORMAL
GFR SERPL CREATININE-BSD FRML MDRD: 83 ML/MIN/1.73SQ M
GLUCOSE SERPL-MCNC: 125 MG/DL (ref 65–140)
GLUCOSE UR STRIP-MCNC: NEGATIVE MG/DL
HCT VFR BLD AUTO: 38.1 % (ref 34.8–46.1)
HGB BLD-MCNC: 12.5 G/DL (ref 11.5–15.4)
HGB UR QL STRIP.AUTO: ABNORMAL
IMM GRANULOCYTES # BLD AUTO: 0.05 THOUSAND/UL (ref 0–0.2)
IMM GRANULOCYTES NFR BLD AUTO: 1 % (ref 0–2)
KETONES UR STRIP-MCNC: ABNORMAL MG/DL
LEUKOCYTE ESTERASE UR QL STRIP: NEGATIVE
LIPASE SERPL-CCNC: 91 U/L (ref 73–393)
LYMPHOCYTES # BLD AUTO: 1.63 THOUSANDS/ΜL (ref 0.6–4.47)
LYMPHOCYTES NFR BLD AUTO: 15 % (ref 14–44)
MCH RBC QN AUTO: 29 PG (ref 26.8–34.3)
MCHC RBC AUTO-ENTMCNC: 32.8 G/DL (ref 31.4–37.4)
MCV RBC AUTO: 88 FL (ref 82–98)
MONOCYTES # BLD AUTO: 0.56 THOUSAND/ΜL (ref 0.17–1.22)
MONOCYTES NFR BLD AUTO: 5 % (ref 4–12)
NEUTROPHILS # BLD AUTO: 8.11 THOUSANDS/ΜL (ref 1.85–7.62)
NEUTS SEG NFR BLD AUTO: 76 % (ref 43–75)
NITRITE UR QL STRIP: POSITIVE
NON-SQ EPI CELLS URNS QL MICRO: ABNORMAL /HPF
NRBC BLD AUTO-RTO: 0 /100 WBCS
PH UR STRIP.AUTO: 5 [PH]
PLATELET # BLD AUTO: 272 THOUSANDS/UL (ref 149–390)
PMV BLD AUTO: 9.7 FL (ref 8.9–12.7)
POTASSIUM SERPL-SCNC: 3.8 MMOL/L (ref 3.5–5.3)
PROT SERPL-MCNC: 7.4 G/DL (ref 6.4–8.2)
PROT UR STRIP-MCNC: >=300 MG/DL
RBC # BLD AUTO: 4.31 MILLION/UL (ref 3.81–5.12)
RBC #/AREA URNS AUTO: ABNORMAL /HPF
SODIUM SERPL-SCNC: 139 MMOL/L (ref 136–145)
SP GR UR STRIP.AUTO: >=1.03 (ref 1–1.03)
UROBILINOGEN UR QL STRIP.AUTO: 0.2 E.U./DL
WBC # BLD AUTO: 10.56 THOUSAND/UL (ref 4.31–10.16)
WBC #/AREA URNS AUTO: ABNORMAL /HPF

## 2020-08-17 PROCEDURE — 96361 HYDRATE IV INFUSION ADD-ON: CPT

## 2020-08-17 PROCEDURE — 36415 COLL VENOUS BLD VENIPUNCTURE: CPT

## 2020-08-17 PROCEDURE — 81025 URINE PREGNANCY TEST: CPT | Performed by: EMERGENCY MEDICINE

## 2020-08-17 PROCEDURE — 74177 CT ABD & PELVIS W/CONTRAST: CPT

## 2020-08-17 PROCEDURE — 85025 COMPLETE CBC W/AUTO DIFF WBC: CPT | Performed by: EMERGENCY MEDICINE

## 2020-08-17 PROCEDURE — 80053 COMPREHEN METABOLIC PANEL: CPT | Performed by: EMERGENCY MEDICINE

## 2020-08-17 PROCEDURE — 96374 THER/PROPH/DIAG INJ IV PUSH: CPT

## 2020-08-17 PROCEDURE — 81001 URINALYSIS AUTO W/SCOPE: CPT | Performed by: EMERGENCY MEDICINE

## 2020-08-17 PROCEDURE — 99284 EMERGENCY DEPT VISIT MOD MDM: CPT | Performed by: EMERGENCY MEDICINE

## 2020-08-17 PROCEDURE — G1004 CDSM NDSC: HCPCS

## 2020-08-17 PROCEDURE — 96375 TX/PRO/DX INJ NEW DRUG ADDON: CPT

## 2020-08-17 PROCEDURE — 99284 EMERGENCY DEPT VISIT MOD MDM: CPT

## 2020-08-17 PROCEDURE — 83690 ASSAY OF LIPASE: CPT | Performed by: EMERGENCY MEDICINE

## 2020-08-17 RX ORDER — METRONIDAZOLE 500 MG/1
500 TABLET ORAL ONCE
Status: COMPLETED | OUTPATIENT
Start: 2020-08-17 | End: 2020-08-17

## 2020-08-17 RX ORDER — CIPROFLOXACIN 500 MG/1
500 TABLET, FILM COATED ORAL 2 TIMES DAILY
Qty: 20 TABLET | Refills: 0 | Status: SHIPPED | OUTPATIENT
Start: 2020-08-17 | End: 2020-08-27

## 2020-08-17 RX ORDER — KETOROLAC TROMETHAMINE 30 MG/ML
15 INJECTION, SOLUTION INTRAMUSCULAR; INTRAVENOUS ONCE
Status: COMPLETED | OUTPATIENT
Start: 2020-08-17 | End: 2020-08-17

## 2020-08-17 RX ORDER — ONDANSETRON 2 MG/ML
4 INJECTION INTRAMUSCULAR; INTRAVENOUS ONCE
Status: COMPLETED | OUTPATIENT
Start: 2020-08-17 | End: 2020-08-17

## 2020-08-17 RX ORDER — DICYCLOMINE HCL 20 MG
20 TABLET ORAL 2 TIMES DAILY
Qty: 20 TABLET | Refills: 0 | Status: SHIPPED | OUTPATIENT
Start: 2020-08-17 | End: 2021-04-29 | Stop reason: ALTCHOICE

## 2020-08-17 RX ORDER — METRONIDAZOLE 500 MG/1
500 TABLET ORAL EVERY 8 HOURS SCHEDULED
Qty: 30 TABLET | Refills: 0 | Status: SHIPPED | OUTPATIENT
Start: 2020-08-17 | End: 2020-08-27

## 2020-08-17 RX ORDER — DICYCLOMINE HCL 20 MG
20 TABLET ORAL ONCE
Status: COMPLETED | OUTPATIENT
Start: 2020-08-17 | End: 2020-08-17

## 2020-08-17 RX ORDER — CIPROFLOXACIN 500 MG/1
500 TABLET, FILM COATED ORAL ONCE
Status: COMPLETED | OUTPATIENT
Start: 2020-08-17 | End: 2020-08-17

## 2020-08-17 RX ORDER — ONDANSETRON 4 MG/1
4 TABLET, ORALLY DISINTEGRATING ORAL EVERY 6 HOURS PRN
Qty: 20 TABLET | Refills: 0 | Status: SHIPPED | OUTPATIENT
Start: 2020-08-17 | End: 2021-04-29 | Stop reason: ALTCHOICE

## 2020-08-17 RX ADMIN — ONDANSETRON 4 MG: 2 INJECTION INTRAMUSCULAR; INTRAVENOUS at 21:18

## 2020-08-17 RX ADMIN — CIPROFLOXACIN HYDROCHLORIDE 500 MG: 500 TABLET, FILM COATED ORAL at 22:15

## 2020-08-17 RX ADMIN — KETOROLAC TROMETHAMINE 15 MG: 30 INJECTION, SOLUTION INTRAMUSCULAR at 21:18

## 2020-08-17 RX ADMIN — DICYCLOMINE HYDROCHLORIDE 20 MG: 20 TABLET ORAL at 21:17

## 2020-08-17 RX ADMIN — METRONIDAZOLE 500 MG: 500 TABLET ORAL at 22:15

## 2020-08-17 RX ADMIN — IOHEXOL 100 ML: 350 INJECTION, SOLUTION INTRAVENOUS at 21:36

## 2020-08-17 RX ADMIN — SODIUM CHLORIDE 1000 ML: 0.9 INJECTION, SOLUTION INTRAVENOUS at 21:17

## 2020-08-17 NOTE — Clinical Note
Socorro Garcia was seen and treated in our emergency department on 8/17/2020  Diagnosis:     Bella Bonilla  may return to work on return date  She may return on 08/20/2020  If you have any questions or concerns, please don't hesitate to call        Judge Harris MD    ______________________________           _______________          _______________  Hospital Representative                              Date                                Time

## 2020-08-17 NOTE — Clinical Note
Daniel Waldron was seen and treated in our emergency department on 8/17/2020  Diagnosis:     Barry Hoffman  may return to work on return date  She may return on 08/20/2020  If you have any questions or concerns, please don't hesitate to call        Steven Vogt MD    ______________________________           _______________          _______________  Hospital Representative                              Date                                Time

## 2020-08-18 NOTE — ED PROVIDER NOTES
History  Chief Complaint   Patient presents with    Abdominal Pain     c/o lower abdominal pain since yesterday at 1800 with nausea and vomiting     This is a 41-year-old female with history of asthma, diabetes who presents with abdominal pain  The pain started yesterday  The pain is in her lower and upper abdomen bilaterally, constant, waxing and waning in intensity, associated with 2 episodes of nonbloody, nonbilious vomiting  Pain is made worse when she stands up and moves  She has taken Motrin without relief  She has never experienced this type of pain in the past   She does have a history of cholecystectomy  Denies any other history of abdominal surgeries  Her last bowel movement was this morning described as normal   Denies any history of kidney stones or alcohol abuse  She does state that she has been spotting for the past month  Denies fever/chills, lightheadedness/dizziness, numbness/weakness, headache, change in vision, URI symptoms, neck pain, chest pain, palpitations, shortness of breath, cough, back pain, flank pain, diarrhea, hematochezia, melena, dysuria, hematuria, abnormal vaginal discharge  Prior to Admission Medications   Prescriptions Last Dose Informant Patient Reported? Taking?    Insulin Glargine (BASAGLAR KWIKPEN SC) Not Taking at Unknown time  Yes No   Sig: Inject under the skin   Prenatal Vit-Fe Fumarate-FA (PRENATAL 1+1 PO)   Yes No   Sig: Take by mouth   albuterol (PROVENTIL HFA,VENTOLIN HFA) 90 mcg/act inhaler   Yes No   Sig: Inhale 2 puffs every 6 (six) hours as needed for wheezing   aspirin 81 mg chewable tablet Not Taking at Unknown time  Yes No   Sig: Chew 81 mg daily   cholecalciferol (VITAMIN D3) 1,000 units tablet Not Taking at Unknown time  Yes No   Sig: Take 1,000 Units by mouth daily   mometasone (ELOCON) 0 1 % lotion   No No   Sig: Apply topically daily Use for short duration only   Patient not taking: Reported on 2/10/2020   predniSONE 10 mg tablet Not Taking at Unknown time  No No   Sig: Take 3 tabs BID X 2 days, 2 tabs BID X 2 days, 1 tab BID X 2 days, 1 tab daily X 2 days   Patient not taking: Reported on 2020      Facility-Administered Medications: None       Past Medical History:   Diagnosis Date    Asthma     last assessed 10/15/15    Diabetes mellitus ([facility] [address] )     GERD (gastroesophageal reflux disease)     last assessed 10/15/15    Gestational diabetes        Past Surgical History:   Procedure Laterality Date     SECTION      last assessed 14    CHOLECYSTECTOMY      last assessed 14    DILATION AND CURETTAGE OF UTERUS      DILATION AND CURETTAGE OF UTERUS      OTHER SURGICAL HISTORY Right     right fallopian tube removed    VA LAP,FULGURATE/EXCISE LESIONS Right 2017    Procedure: LAPAROSCOPIC OVARIAN CYSTECTOMY VERSUS OOPHERECTOMY;  Surgeon: Francoise Henderson MD;  [facility];  Service: Gynecology    VA REVISE MEDIAN N/CARPAL TUNNEL SURG Right 3/14/2018    Procedure: RELEASE CARPAL TUNNEL;  Surgeon: Kelsey Guo MD;  [facility];  Service: Orthopedics    SALPINGECTOMY      right side    TONSILLECTOMY      last assessed 14       Family History   Problem Relation Age of Onset    Arthritis Mother     Atrial fibrillation Mother     COPD Mother     Hypertension Mother     Other Mother         urinary incontinence    Depression Father     Anxiety disorder Father     Arthritis Father     Diabetes Father     Hypertension Father     Heart disease Father     Hyperlipidemia Father     Hydrocephalus Father     Urinary tract infection Father     Hodgkin's lymphoma Brother         and non hodgkin's-per allscripts    Stomach cancer Maternal Aunt    Yancy Johnson Migraines Daughter     Other Daughter         neuroblastoma    Jaundice Daughter     Lung disease Maternal Uncle         mesothelioma     I have reviewed and agree with the history as documented      E-Cigarette/Vaping     E-Cigarette/Vaping Substances     Social History     Tobacco Use    Smoking status: Former Smoker     Packs/day: 0 50     Years: 20 00     Pack years: 10 00     Types: Cigarettes     Last attempt to quit: 2015     Years since quittin 6    Smokeless tobacco: Never Used    Tobacco comment:    Substance Use Topics    Alcohol use: Yes     Comment: rarely; social    Drug use: No       Review of Systems   Constitutional: Negative for chills and fever  HENT: Negative for rhinorrhea, sore throat and trouble swallowing  Eyes: Negative for photophobia and visual disturbance  Respiratory: Negative for cough, chest tightness and shortness of breath  Cardiovascular: Negative for chest pain, palpitations and leg swelling  Gastrointestinal: Positive for abdominal pain and nausea  Negative for blood in stool, diarrhea and vomiting  Endocrine: Negative for polyuria  Genitourinary: Negative for dysuria, flank pain, hematuria, vaginal bleeding and vaginal discharge  Vaginal spotting   Musculoskeletal: Negative for back pain and neck pain  Skin: Negative for color change and rash  Allergic/Immunologic: Negative for immunocompromised state  Neurological: Negative for dizziness, weakness, light-headedness, numbness and headaches  All other systems reviewed and are negative  Physical Exam  Physical Exam  Constitutional:       General: She is not in acute distress  Appearance: Normal appearance  She is well-developed  HENT:      Mouth/Throat:      Pharynx: Uvula midline  Eyes:      Conjunctiva/sclera: Conjunctivae normal       Pupils: Pupils are equal, round, and reactive to light  Neck:      Thyroid: No thyroid mass or thyromegaly  Trachea: Trachea normal    Cardiovascular:      Rate and Rhythm: Normal rate and regular rhythm  Pulses: Normal pulses  Heart sounds: Normal heart sounds  No murmur     Pulmonary:      Effort: Pulmonary effort is normal       Breath sounds: Normal breath sounds  Abdominal:      General: Bowel sounds are normal       Palpations: Abdomen is soft  Tenderness: There is generalized abdominal tenderness  There is no guarding or rebound  Comments: Abdomen is tender even to the lightest touch  Skin:     General: Skin is warm and dry  Neurological:      Mental Status: She is alert  Psychiatric:         Speech: Speech normal          Behavior: Behavior normal  Behavior is cooperative  Thought Content:  Thought content normal          Vital Signs  ED Triage Vitals [08/17/20 2035]   Temperature Pulse Respirations Blood Pressure SpO2   97 8 °F (36 6 °C) 83 22 (!) 178/177 96 %      Temp Source Heart Rate Source Patient Position - Orthostatic VS BP Location FiO2 (%)   Temporal Monitor Sitting Left arm --      Pain Score       Worst Possible Pain           Vitals:    08/17/20 2035 08/17/20 2100 08/17/20 2200   BP: (!) 178/177 141/82 121/71   Pulse: 83 78 64   Patient Position - Orthostatic VS: Sitting           Visual Acuity      ED Medications  Medications   ketorolac (TORADOL) injection 15 mg (15 mg Intravenous Given 8/17/20 2118)   dicyclomine (BENTYL) tablet 20 mg (20 mg Oral Given 8/17/20 2117)   ondansetron (ZOFRAN) injection 4 mg (4 mg Intravenous Given 8/17/20 2118)   sodium chloride 0 9 % bolus 1,000 mL (0 mL Intravenous Stopped 8/17/20 2217)   iohexol (OMNIPAQUE) 350 MG/ML injection (MULTI-DOSE) 100 mL (100 mL Intravenous Given 8/17/20 2136)   ciprofloxacin (CIPRO) tablet 500 mg (500 mg Oral Given 8/17/20 2215)   metroNIDAZOLE (FLAGYL) tablet 500 mg (500 mg Oral Given 8/17/20 2215)       Diagnostic Studies  Results Reviewed     Procedure Component Value Units Date/Time    Comprehensive metabolic panel [[id number]] Collected:  08/17/20 2053    Lab Status:  Final result Specimen:  Blood from Arm, Right Updated:  08/17/20 2125     Sodium 139 mmol/L      Potassium 3 8 mmol/L      Chloride 105 mmol/L      CO2 28 mmol/L      ANION GAP 6 mmol/L      BUN 15 mg/dL      Creatinine 0 88 mg/dL      Glucose 125 mg/dL      Calcium 8 5 mg/dL      AST 13 U/L      ALT 33 U/L      Alkaline Phosphatase 67 U/L      Total Protein 7 4 g/dL      Albumin 3 5 g/dL      Total Bilirubin 0 40 mg/dL      eGFR 83 ml/min/1 73sq m     Narrative:       [place] guidelines for Chronic Kidney Disease (CKD):     Stage 1 with normal or high GFR (GFR > 90 mL/min/1 73 square meters)    Stage 2 Mild CKD (GFR = 60-89 mL/min/1 73 square meters)    Stage 3A Moderate CKD (GFR = 45-59 mL/min/1 73 square meters)    Stage 3B Moderate CKD (GFR = 30-44 mL/min/1 73 square meters)    Stage 4 Severe CKD (GFR = 15-29 mL/min/1 73 square meters)    Stage 5 End Stage CKD (GFR <15 mL/min/1 73 square meters)  Note: GFR calculation is accurate only with a steady state creatinine    Lipase [[id number]]  (Normal) Collected:  08/17/20 2053    Lab Status:  Final result Specimen:  Blood from Arm, Right Updated:  08/17/20 2120     Lipase 91 u/L     Urine Microscopic [[id number]]  (Abnormal) Collected:  08/17/20 2048    Lab Status:  Final result Specimen:  Urine, Clean Catch Updated:  08/17/20 2110     RBC, UA Innumerable /hpf      WBC, UA 1-2 /hpf      Epithelial Cells Occasional /hpf      Bacteria, UA Occasional /hpf     UA w Reflex to Microscopic w Reflex to Culture [[id number]]  (Abnormal) Collected:  08/17/20 2048    Lab Status:  Final result Specimen:  Urine, Clean Catch Updated:  08/17/20 2107     Color, UA Red     Clarity, UA Cloudy     Specific Gravity, UA >=1 030     pH, UA 5 0     Leukocytes, UA Negative     Nitrite, UA Positive     Protein, UA >=300 mg/dl      Glucose, UA Negative mg/dl      Ketones, UA Trace mg/dl      Urobilinogen, UA 0 2 E U /dl      Bilirubin, UA Negative     Blood, UA Large    CBC and differential [[id number]]  (Abnormal) Collected:  08/17/20 2053    Lab Status:  Final result Specimen:  Blood from Arm, Right Updated:  08/17/20 2059     WBC 10 56 Thousand/uL RBC 4 31 Million/uL      Hemoglobin 12 5 g/dL      Hematocrit 38 1 %      MCV 88 fL      MCH 29 0 pg      MCHC 32 8 g/dL      RDW 12 4 %      MPV 9 7 fL      Platelets 394 Thousands/uL      nRBC 0 /100 WBCs      Neutrophils Relative 76 %      Immat GRANS % 1 %      Lymphocytes Relative 15 %      Monocytes Relative 5 %      Eosinophils Relative 2 %      Basophils Relative 1 %      Neutrophils Absolute 8 11 Thousands/µL      Immature Grans Absolute 0 05 Thousand/uL      Lymphocytes Absolute 1 63 Thousands/µL      Monocytes Absolute 0 56 Thousand/µL      Eosinophils Absolute 0 16 Thousand/µL      Basophils Absolute 0 05 Thousands/µL     POCT pregnancy, urine [[id number]]  (Normal) Resulted:  08/17/20 2052    Lab Status:  Final result Specimen:  Urine Updated:  08/17/20 2053     EXT PREG TEST UR (Ref: Negative) negative     Control valid                 CT abdomen pelvis with contrast   Final Result by Tyron Boxer, MD (08/17 3382)      Uncomplicated sigmoid diverticulitis  Small 1 3 cm nodule anterior to the left pelvic rectus abdominis muscle is nonspecific could relate to scarring although I cannot exclude endometriosis if clinically suspected  This was not seen on the prior CT               Workstation performed: [id number]                    Procedures  Procedures         ED Course  ED Course as of Aug 17 2245   Mon Aug 17, 2020   2114 Vaginal spotting   RBC, UA(!): Innumerable       US AUDIT      Most Recent Value   Initial Alcohol Screen: US AUDIT-C    1  How often do you have a drink containing alcohol?  0 Filed at: 08/17/2020 2036   2  How many drinks containing alcohol do you have on a typical day you are drinking? 0 Filed at: 08/17/2020 2036   3b  FEMALE Any Age, or MALE 65+: How often do you have 4 or more drinks on one occassion?   0 Filed at: 08/17/2020 2036   Audit-C Score  0 Filed at: 08/17/2020 2036                                            MDM  Number of Diagnoses or Management Options  Diagnosis management comments: Plan to check labs, urinalysis with urine pregnancy  CT abdomen/pelvis with contrast   Analgesia, Zofran, fluids  Disposition pending results  Disposition  Final diagnoses:   Diverticulitis     Time reflects when diagnosis was documented in both MDM as applicable and the Disposition within this note     Time User Action Codes Description Comment    8/17/2020 10:10 PM Alonzo Toure Add [K57 92] Diverticulitis       ED Disposition     ED Disposition Condition Date/Time Comment    Discharge Stable Mon Aug 17, 2020 10:10 PM Mady Dhaliwal discharge to home/self care  Follow-up Information     Follow up With Specialties Details Why Contact Info Additional [address] [facility], [place] Physician Assistant Schedule an appointment as soon as possible for a visit  for follow up of abdominal nodule, possible endometriosis [address] Emergency Department Emergency Medicine Go to  If symptoms worsen [place] [id number]  [phone] [place] ED, [address], [address]          Discharge Medication List as of 8/17/2020 10:14 PM      START taking these medications    Details   ciprofloxacin (CIPRO) 500 mg tablet Take 1 tablet (500 mg total) by mouth 2 (two) times a day for 10 days, Starting Mon 8/17/2020, Until u 8/27/2020, Normal      dicyclomine (BENTYL) 20 mg tablet Take 1 tablet (20 mg total) by mouth 2 (two) times a day, Starting Mon 8/17/2020, Normal      metroNIDAZOLE (FLAGYL) 500 mg tablet Take 1 tablet (500 mg total) by mouth every 8 (eight) hours for 10 days, Starting Mon 8/17/2020, Until u 8/27/2020, Normal      ondansetron (ZOFRAN-ODT) 4 mg disintegrating tablet Take 1 tablet (4 mg total) by mouth every 6 (six) hours as needed for nausea, Starting Mon 8/17/2020, Normal         CONTINUE these medications which have NOT CHANGED    Details albuterol (PROVENTIL HFA,VENTOLIN HFA) 90 mcg/act inhaler Inhale 2 puffs every 6 (six) hours as needed for wheezing, Historical Med      aspirin 81 mg chewable tablet Chew 81 mg daily, Historical Med      cholecalciferol (VITAMIN D3) 1,000 units tablet Take 1,000 Units by mouth daily, Historical Med      Insulin Glargine (BASAGLAR KWIKPEN SC) Inject under the skin, Historical Med      mometasone (ELOCON) 0 1 % lotion Apply topically daily Use for short duration only, Starting Wed 8/21/2019, Normal      predniSONE 10 mg tablet Take 3 tabs BID X 2 days, 2 tabs BID X 2 days, 1 tab BID X 2 days, 1 tab daily X 2 days, Normal      Prenatal Vit-Fe Fumarate-FA (PRENATAL 1+1 PO) Take by mouth, Historical Med           No discharge procedures on file      PDMP Review     None          ED Provider  Electronically Signed by           Adelaide Irwin MD  08/17/20 6927

## 2020-08-18 NOTE — ED NOTES
Patient transported to [id number] [facility], [address] [place] [address]  08/17/20 9566

## 2021-02-23 ENCOUNTER — HOSPITAL ENCOUNTER (EMERGENCY)
Facility: HOSPITAL | Age: 41
Discharge: HOME | End: 2021-02-23
Attending: EMERGENCY MEDICINE | Admitting: EMERGENCY MEDICINE
Payer: COMMERCIAL

## 2021-02-23 ENCOUNTER — APPOINTMENT (EMERGENCY)
Dept: CT IMAGING | Facility: HOSPITAL | Age: 41
End: 2021-02-23
Payer: COMMERCIAL

## 2021-02-23 VITALS
HEART RATE: 61 BPM | HEIGHT: 72 IN | BODY MASS INDEX: 39.68 KG/M2 | RESPIRATION RATE: 18 BRPM | WEIGHT: 293 LBS | OXYGEN SATURATION: 98 % | SYSTOLIC BLOOD PRESSURE: 112 MMHG | TEMPERATURE: 97.4 F | DIASTOLIC BLOOD PRESSURE: 72 MMHG

## 2021-02-23 DIAGNOSIS — R93.5 ABNORMAL CT OF THE ABDOMEN: ICD-10-CM

## 2021-02-23 DIAGNOSIS — M79.89 NODULE OF SOFT TISSUE: ICD-10-CM

## 2021-02-23 DIAGNOSIS — R11.0 NAUSEA: ICD-10-CM

## 2021-02-23 DIAGNOSIS — R10.9 ABDOMINAL PAIN: Primary | ICD-10-CM

## 2021-02-23 LAB
ALBUMIN SERPL BCP-MCNC: 3.6 G/DL (ref 3.5–5)
ALP SERPL-CCNC: 59 U/L (ref 46–116)
ALT SERPL W P-5'-P-CCNC: 29 U/L (ref 12–78)
ANION GAP SERPL CALCULATED.3IONS-SCNC: 7 MMOL/L (ref 4–13)
AST SERPL W P-5'-P-CCNC: 15 U/L (ref 5–45)
BASOPHILS # BLD AUTO: 0.05 THOUSANDS/ΜL (ref 0–0.1)
BASOPHILS NFR BLD AUTO: 1 % (ref 0–1)
BILIRUB SERPL-MCNC: 0.2 MG/DL (ref 0.2–1)
BILIRUB UR QL STRIP: NEGATIVE
BUN SERPL-MCNC: 12 MG/DL (ref 5–25)
CALCIUM SERPL-MCNC: 8.5 MG/DL (ref 8.3–10.1)
CHLORIDE SERPL-SCNC: 104 MMOL/L (ref 100–108)
CLARITY UR: CLEAR
CO2 SERPL-SCNC: 30 MMOL/L (ref 21–32)
COLOR UR: YELLOW
CREAT SERPL-MCNC: 0.78 MG/DL (ref 0.6–1.3)
D DIMER PPP FEU-MCNC: 0.67 UG/ML FEU
EOSINOPHIL # BLD AUTO: 0.19 THOUSAND/ΜL (ref 0–0.61)
EOSINOPHIL NFR BLD AUTO: 3 % (ref 0–6)
ERYTHROCYTE [DISTWIDTH] IN BLOOD BY AUTOMATED COUNT: 12.7 % (ref 11.6–15.1)
EXT PREG TEST URINE: NEGATIVE
EXT. CONTROL ED NAV: NORMAL
FLUAV RNA RESP QL NAA+PROBE: NEGATIVE
FLUBV RNA RESP QL NAA+PROBE: NEGATIVE
GFR SERPL CREATININE-BSD FRML MDRD: 96 ML/MIN/1.73SQ M
GLUCOSE SERPL-MCNC: 81 MG/DL (ref 65–140)
GLUCOSE UR STRIP-MCNC: NEGATIVE MG/DL
HCT VFR BLD AUTO: 38 % (ref 34.8–46.1)
HGB BLD-MCNC: 12.4 G/DL (ref 11.5–15.4)
HGB UR QL STRIP.AUTO: NEGATIVE
IMM GRANULOCYTES # BLD AUTO: 0.02 THOUSAND/UL (ref 0–0.2)
IMM GRANULOCYTES NFR BLD AUTO: 0 % (ref 0–2)
KETONES UR STRIP-MCNC: NEGATIVE MG/DL
LACTATE SERPL-SCNC: 0.8 MMOL/L (ref 0.5–2)
LEUKOCYTE ESTERASE UR QL STRIP: NEGATIVE
LIPASE SERPL-CCNC: 116 U/L (ref 73–393)
LYMPHOCYTES # BLD AUTO: 2.38 THOUSANDS/ΜL (ref 0.6–4.47)
LYMPHOCYTES NFR BLD AUTO: 37 % (ref 14–44)
MCH RBC QN AUTO: 29 PG (ref 26.8–34.3)
MCHC RBC AUTO-ENTMCNC: 32.6 G/DL (ref 31.4–37.4)
MCV RBC AUTO: 89 FL (ref 82–98)
MONOCYTES # BLD AUTO: 0.4 THOUSAND/ΜL (ref 0.17–1.22)
MONOCYTES NFR BLD AUTO: 6 % (ref 4–12)
NEUTROPHILS # BLD AUTO: 3.48 THOUSANDS/ΜL (ref 1.85–7.62)
NEUTS SEG NFR BLD AUTO: 53 % (ref 43–75)
NITRITE UR QL STRIP: NEGATIVE
NRBC BLD AUTO-RTO: 0 /100 WBCS
PH UR STRIP.AUTO: 7 [PH]
PLATELET # BLD AUTO: 295 THOUSANDS/UL (ref 149–390)
PMV BLD AUTO: 9.9 FL (ref 8.9–12.7)
POTASSIUM SERPL-SCNC: 4.1 MMOL/L (ref 3.5–5.3)
PROT SERPL-MCNC: 7.2 G/DL (ref 6.4–8.2)
PROT UR STRIP-MCNC: NEGATIVE MG/DL
RBC # BLD AUTO: 4.27 MILLION/UL (ref 3.81–5.12)
RSV RNA RESP QL NAA+PROBE: NEGATIVE
SARS-COV-2 RNA RESP QL NAA+PROBE: NEGATIVE
SODIUM SERPL-SCNC: 141 MMOL/L (ref 136–145)
SP GR UR STRIP.AUTO: 1.01 (ref 1–1.03)
TROPONIN I SERPL-MCNC: <0.02 NG/ML
UROBILINOGEN UR QL STRIP.AUTO: 0.2 E.U./DL
WBC # BLD AUTO: 6.52 THOUSAND/UL (ref 4.31–10.16)

## 2021-02-23 PROCEDURE — 85379 FIBRIN DEGRADATION QUANT: CPT | Performed by: EMERGENCY MEDICINE

## 2021-02-23 PROCEDURE — 93005 ELECTROCARDIOGRAM TRACING: CPT

## 2021-02-23 PROCEDURE — 74177 CT ABD & PELVIS W/CONTRAST: CPT

## 2021-02-23 PROCEDURE — 81003 URINALYSIS AUTO W/O SCOPE: CPT | Performed by: EMERGENCY MEDICINE

## 2021-02-23 PROCEDURE — 71275 CT ANGIOGRAPHY CHEST: CPT

## 2021-02-23 PROCEDURE — 96374 THER/PROPH/DIAG INJ IV PUSH: CPT

## 2021-02-23 PROCEDURE — 99285 EMERGENCY DEPT VISIT HI MDM: CPT | Performed by: EMERGENCY MEDICINE

## 2021-02-23 PROCEDURE — 99284 EMERGENCY DEPT VISIT MOD MDM: CPT

## 2021-02-23 PROCEDURE — 0241U HB NFCT DS VIR RESP RNA 4 TRGT: CPT | Performed by: EMERGENCY MEDICINE

## 2021-02-23 PROCEDURE — 96375 TX/PRO/DX INJ NEW DRUG ADDON: CPT

## 2021-02-23 PROCEDURE — 81025 URINE PREGNANCY TEST: CPT | Performed by: EMERGENCY MEDICINE

## 2021-02-23 PROCEDURE — 80053 COMPREHEN METABOLIC PANEL: CPT | Performed by: EMERGENCY MEDICINE

## 2021-02-23 PROCEDURE — 83605 ASSAY OF LACTIC ACID: CPT | Performed by: EMERGENCY MEDICINE

## 2021-02-23 PROCEDURE — 83690 ASSAY OF LIPASE: CPT | Performed by: EMERGENCY MEDICINE

## 2021-02-23 PROCEDURE — 84484 ASSAY OF TROPONIN QUANT: CPT | Performed by: EMERGENCY MEDICINE

## 2021-02-23 PROCEDURE — 85025 COMPLETE CBC W/AUTO DIFF WBC: CPT | Performed by: EMERGENCY MEDICINE

## 2021-02-23 PROCEDURE — G1004 CDSM NDSC: HCPCS

## 2021-02-23 PROCEDURE — 96361 HYDRATE IV INFUSION ADD-ON: CPT

## 2021-02-23 PROCEDURE — 36415 COLL VENOUS BLD VENIPUNCTURE: CPT | Performed by: EMERGENCY MEDICINE

## 2021-02-23 RX ORDER — DICYCLOMINE HCL 20 MG
20 TABLET ORAL EVERY 6 HOURS PRN
Qty: 20 TABLET | Refills: 0 | Status: SHIPPED | OUTPATIENT
Start: 2021-02-23 | End: 2021-04-29 | Stop reason: ALTCHOICE

## 2021-02-23 RX ORDER — PROMETHAZINE HYDROCHLORIDE 25 MG/ML
12.5 INJECTION, SOLUTION INTRAMUSCULAR; INTRAVENOUS EVERY 6 HOURS PRN
Status: DISCONTINUED | OUTPATIENT
Start: 2021-02-23 | End: 2021-02-23 | Stop reason: HOSPADM

## 2021-02-23 RX ORDER — ONDANSETRON 4 MG/1
4 TABLET, ORALLY DISINTEGRATING ORAL EVERY 8 HOURS PRN
Qty: 20 TABLET | Refills: 0 | Status: SHIPPED | OUTPATIENT
Start: 2021-02-23

## 2021-02-23 RX ORDER — ONDANSETRON 2 MG/ML
4 INJECTION INTRAMUSCULAR; INTRAVENOUS ONCE
Status: COMPLETED | OUTPATIENT
Start: 2021-02-23 | End: 2021-02-23

## 2021-02-23 RX ORDER — FENTANYL CITRATE 50 UG/ML
50 INJECTION, SOLUTION INTRAMUSCULAR; INTRAVENOUS ONCE
Status: COMPLETED | OUTPATIENT
Start: 2021-02-23 | End: 2021-02-23

## 2021-02-23 RX ORDER — PROMETHAZINE HYDROCHLORIDE 25 MG/1
25 TABLET ORAL EVERY 6 HOURS PRN
Qty: 20 TABLET | Refills: 0 | Status: SHIPPED | OUTPATIENT
Start: 2021-02-23 | End: 2021-04-29 | Stop reason: ALTCHOICE

## 2021-02-23 RX ADMIN — PROMETHAZINE HYDROCHLORIDE 12.5 MG: 25 INJECTION INTRAMUSCULAR; INTRAVENOUS at 17:33

## 2021-02-23 RX ADMIN — FENTANYL CITRATE 50 MCG: 50 INJECTION, SOLUTION INTRAMUSCULAR; INTRAVENOUS at 14:44

## 2021-02-23 RX ADMIN — IOHEXOL 100 ML: 350 INJECTION, SOLUTION INTRAVENOUS at 16:47

## 2021-02-23 RX ADMIN — ONDANSETRON 4 MG: 2 INJECTION INTRAMUSCULAR; INTRAVENOUS at 14:42

## 2021-02-23 RX ADMIN — SODIUM CHLORIDE 1000 ML: 0.9 INJECTION, SOLUTION INTRAVENOUS at 14:49

## 2021-02-23 NOTE — Clinical Note
Luis Mishra was seen and treated in our emergency department on 2/23/2021  Diagnosis:     Ana Dewitt  may return to work on return date  She may return on this date: 03/01/2021         If you have any questions or concerns, please don't hesitate to call        Kendy Ulrich MD    ______________________________           _______________          _______________  Hospital Representative                              Date                                Time

## 2021-02-23 NOTE — DISCHARGE INSTRUCTIONS
No drinking driving or heavy machinery use with Phenergan (promethazine) or for 6 hours after discharge  Zofran as needed for nausea  Generic gas-x extra strength 4 tabs (over the counter 250mg) upto 4 times a day as needed for bloating  Famotidine (generic pepcid) 2-20mg tabs daily to cut stomach acid  Plenty of fluids Gatorade Powerade Jell-O freeze pops water non citrus non tomato juice  Cornelia diet bananas rice applesauce toast chicken noodle soup pasta potatoes cooked meats  Avoid fiber, raw vegetables, peas, corn for 1 5 to 2 weeks  Return with fever worsening or change in pain dark tarry stools red stools inability keep fluids down lightheadedness or any new or worsening symptoms

## 2021-02-23 NOTE — ED NOTES
Patient ambulated to the bathroom to provide a UA without difficulties        Noemy Hager RN  02/23/21 1793

## 2021-02-24 ENCOUNTER — OFFICE VISIT (OUTPATIENT)
Dept: GASTROENTEROLOGY | Facility: CLINIC | Age: 41
End: 2021-02-24
Payer: COMMERCIAL

## 2021-02-24 VITALS
SYSTOLIC BLOOD PRESSURE: 130 MMHG | WEIGHT: 293 LBS | DIASTOLIC BLOOD PRESSURE: 77 MMHG | RESPIRATION RATE: 18 BRPM | HEIGHT: 72 IN | HEART RATE: 58 BPM | BODY MASS INDEX: 39.68 KG/M2 | TEMPERATURE: 99.2 F

## 2021-02-24 DIAGNOSIS — R11.0 NAUSEA: ICD-10-CM

## 2021-02-24 DIAGNOSIS — R10.84 GENERALIZED ABDOMINAL PAIN: Primary | ICD-10-CM

## 2021-02-24 DIAGNOSIS — K21.9 GASTROESOPHAGEAL REFLUX DISEASE WITHOUT ESOPHAGITIS: ICD-10-CM

## 2021-02-24 DIAGNOSIS — R93.5 ABNORMAL CT OF THE ABDOMEN: ICD-10-CM

## 2021-02-24 DIAGNOSIS — R19.8 ALTERNATING CONSTIPATION AND DIARRHEA: ICD-10-CM

## 2021-02-24 LAB
ATRIAL RATE: 47 BPM
P AXIS: 49 DEGREES
PR INTERVAL: 154 MS
QRS AXIS: 6 DEGREES
QRSD INTERVAL: 92 MS
QT INTERVAL: 442 MS
QTC INTERVAL: 391 MS
T WAVE AXIS: 16 DEGREES
VENTRICULAR RATE: 47 BPM

## 2021-02-24 PROCEDURE — 93010 ELECTROCARDIOGRAM REPORT: CPT | Performed by: INTERNAL MEDICINE

## 2021-02-24 PROCEDURE — 99244 OFF/OP CNSLTJ NEW/EST MOD 40: CPT | Performed by: INTERNAL MEDICINE

## 2021-02-24 RX ORDER — SODIUM, POTASSIUM,MAG SULFATES 17.5-3.13G
180 SOLUTION, RECONSTITUTED, ORAL ORAL ONCE
Qty: 180 ML | Refills: 0 | Status: SHIPPED | OUTPATIENT
Start: 2021-02-24 | End: 2021-04-16 | Stop reason: HOSPADM

## 2021-02-24 NOTE — PATIENT INSTRUCTIONS
Colon/EGD Scheduled 4/16/21 Dr Victor M Oneal  Follow up scheduled  Procedure packet with Suprep information given and explained to patient she expressed understanding

## 2021-02-24 NOTE — PROGRESS NOTES
[facility] - Outpatient Consultation  Pepper Castro 45 y o  female MRN: [id number]  Encounter: [id number]          ASSESSMENT AND PLAN:      1  Generalized abdominal pain  2  Nausea  3  Gastroesophageal reflux disease without esophagitis  4  Alternating constipation and diarrhea  Her symptoms are most likely due to an infectious gastroenteritis  Given her previous history of two episodes of diverticulitis and her CT findings, I will schedule her for colonoscopy to evaluate for infectious colitis, ischemic colitis, inflammatory bowel disease, and less likely malignancy  Since she also has reflux and nausea and upper abdominal pain, I will schedule her for an upper endoscopy at the same time  She should continue the dicyclomine as needed  She can also take Pepcid as needed  - Colonoscopy; Future  - EGD; Future  - Suprep Bowel Prep Kit 17 5-3 13-1 6 GM/177ML SOLN; Take 180 mL by mouth once for 1 dose  Dispense: 180 mL; Refill: 0    ______________________________________________________________________    HPI:  She presents for evaluation because of approximately 4 to 5 days of alternating diarrhea with constipation, generalized abdominal pain mainly in the right upper quadrant in the left lower quadrant, reflux, and nausea  She does not have any sick contacts or recent travel history  She has not had any bleeding or weight loss  She had an upper endoscopy approximately 20 years ago that revealed reflux but has never previously had a colonoscopy  Because of these symptoms she had a CT scan performed which showed diverticulosis with some colonic thickening but no diverticulitis  REVIEW OF SYSTEMS:    CONSTITUTIONAL: Denies any fever, chills, rigors, and weight loss, but has fatigue  HEENT: No earache or tinnitus  Denies hearing loss or visual disturbances  CARDIOVASCULAR: No chest pain or palpitations     RESPIRATORY: Denies any cough, hemoptysis, shortness of breath or dyspnea on exertion  GASTROINTESTINAL: As noted in the History of Present Illness  GENITOURINARY: No problems with urination  Denies any hematuria or dysuria  NEUROLOGIC: No dizziness or vertigo, denies headaches  MUSCULOSKELETAL: Denies any muscle or joint pain  SKIN: Denies skin rashes or itching  ENDOCRINE: Denies excessive thirst  Denies intolerance to heat or cold  PSYCHOSOCIAL: Denies depression or anxiety  Denies any recent memory loss         Historical Information   Past Medical History:   Diagnosis Date    Asthma     last assessed 10/15/15    Diabetes mellitus ([facility] [address] )     gestational      GERD (gastroesophageal reflux disease)     last assessed 10/15/15    Gestational diabetes      Past Surgical History:   Procedure Laterality Date     SECTION      last assessed 14    CHOLECYSTECTOMY      last assessed 14    DILATION AND CURETTAGE OF UTERUS      DILATION AND CURETTAGE OF UTERUS      OTHER SURGICAL HISTORY Right     right fallopian tube removed    CO LAP,FULGURATE/EXCISE LESIONS Right 2017    Procedure: LAPAROSCOPIC OVARIAN CYSTECTOMY VERSUS OOPHERECTOMY;  Surgeon: Denise Ennis MD;  [facility];  Service: Gynecology    CO REVISE MEDIAN N/CARPAL TUNNEL SURG Right 3/14/2018    Procedure: RELEASE CARPAL TUNNEL;  Surgeon: Gwyn Prdao MD;  Location: [facility];  Service: Orthopedics    SALPINGECTOMY      right side    TONSILLECTOMY      last assessed 14     Social History   Social History     Substance and Sexual Activity   Alcohol Use Yes    Comment: rarely; social     Social History     Substance and Sexual Activity   Drug Use No     Social History     Tobacco Use   Smoking Status Former Smoker    Packs/day: 0 50    Years: 20 00    Pack years: 10 00    Types: Cigarettes    Quit date:     Years since quittin 1   Smokeless Tobacco Never Used   Tobacco Comment    2015     Family History   Problem Relation Age of Onset    Arthritis Mother     Atrial fibrillation Mother    [employer] COPD Mother     Hypertension Mother    [employer] Other Mother         urinary incontinence    Depression Father     Anxiety disorder Father     Arthritis Father     Diabetes Father     Hypertension Father     Heart disease Father     Hyperlipidemia Father     Hydrocephalus Father     Urinary tract infection Father     Hodgkin's lymphoma Brother         and non hodgkin's-per allscripts    Stomach cancer Maternal Aunt    [employer] Migraines Daughter     Other Daughter         neuroblastoma    Jaundice Daughter     Lung disease Maternal Uncle         mesothelioma       Meds/Allergies       Current Outpatient Medications:     albuterol (PROVENTIL HFA,VENTOLIN HFA) 90 mcg/act inhaler    ondansetron (ZOFRAN-ODT) 4 mg disintegrating tablet    ondansetron (ZOFRAN-ODT) 4 mg disintegrating tablet    promethazine (PHENERGAN) 25 mg tablet    aspirin 81 mg chewable tablet    cholecalciferol (VITAMIN D3) 1,000 units tablet    dicyclomine (BENTYL) 20 mg tablet    dicyclomine (BENTYL) 20 mg tablet    Insulin Glargine (BASAGLAR KWIKPEN SC)    mometasone (ELOCON) 0 1 % lotion    predniSONE 10 mg tablet    Prenatal Vit-Fe Fumarate-FA (PRENATAL 1+1 PO)    Suprep Bowel Prep Kit 17 5-3 13-1 6 GM/177ML SOLN  No current facility-administered medications for this visit  Allergies   Allergen Reactions    Amoxicillin Rash           Objective     Blood pressure 130/77, pulse 58, temperature 99 2 °F (37 3 °C), temperature source Tympanic, resp  rate 18, height 6' (1 829 m), weight 135 kg (298 lb), last menstrual period 01/26/2021, currently breastfeeding  Body mass index is 40 42 kg/m²          PHYSICAL EXAM:      General Appearance:   Alert, cooperative, no distress   HEENT:   Normocephalic, atraumatic, anicteric      Neck:  Supple, symmetrical, trachea midline   Lungs:   Clear to auscultation bilaterally; no rales, rhonchi or wheezing; respirations unlabored    Heart[de-identified]   Regular rate and rhythm; no murmur, rub, or gallop  Abdomen:   Soft, generalized tenderness, non-distended; normal bowel sounds; no masses, no organomegaly    Genitalia:   Deferred    Rectal:   Deferred    Extremities:  No cyanosis, clubbing or edema    Pulses:  2+ and symmetric    Skin:  No jaundice, rashes, or lesions    Lymph nodes:  No palpable cervical lymphadenopathy        Lab Results:   No visits with results within 1 Day(s) from this visit     Latest known visit with results is:   Admission on 02/23/2021, Discharged on 02/23/2021   Component Date Value    WBC 02/23/2021 6 52     RBC 02/23/2021 4 27     Hemoglobin 02/23/2021 12 4     Hematocrit 02/23/2021 38 0     MCV 02/23/2021 89     MCH 02/23/2021 29 0     MCHC 02/23/2021 32 6     RDW 02/23/2021 12 7     MPV 02/23/2021 9 9     Platelets 18/99/4009 295     nRBC 02/23/2021 0     Neutrophils Relative 02/23/2021 53     Immat GRANS % 02/23/2021 0     Lymphocytes Relative 02/23/2021 37     Monocytes Relative 02/23/2021 6     Eosinophils Relative 02/23/2021 3     Basophils Relative 02/23/2021 1     Neutrophils Absolute 02/23/2021 3 48     Immature Grans Absolute 02/23/2021 0 02     Lymphocytes Absolute 02/23/2021 2 38     Monocytes Absolute 02/23/2021 0 40     Eosinophils Absolute 02/23/2021 0 19     Basophils Absolute 02/23/2021 0 05     SARS-CoV-2 02/23/2021 Negative     INFLUENZA A PCR 02/23/2021 Negative     INFLUENZA B PCR 02/23/2021 Negative     RSV PCR 02/23/2021 Negative     Color, UA 02/23/2021 Yellow     Clarity, UA 02/23/2021 Clear     Specific Gravity, UA 02/23/2021 1 015     pH, UA 02/23/2021 7 0     Leukocytes, UA 02/23/2021 Negative     Nitrite, UA 02/23/2021 Negative     Protein, UA 02/23/2021 Negative     Glucose, UA 02/23/2021 Negative     Ketones, UA 02/23/2021 Negative     Urobilinogen, UA 02/23/2021 0 2     Bilirubin, UA 02/23/2021 Negative     Blood, UA 02/23/2021 Negative     EXT PREG TEST UR (Ref: N* 02/23/2021 negative     Control 02/23/2021 valid     Troponin I 02/23/2021 <0 02     D-Dimer, Quant 02/23/2021 0 67*    Sodium 02/23/2021 141     Potassium 02/23/2021 4 1     Chloride 02/23/2021 104     CO2 02/23/2021 30     ANION GAP 02/23/2021 7     BUN 02/23/2021 12     Creatinine 02/23/2021 0 78     Glucose 02/23/2021 81     Calcium 02/23/2021 8 5     AST 02/23/2021 15     ALT 02/23/2021 29     Alkaline Phosphatase 02/23/2021 59     Total Protein 02/23/2021 7 2     Albumin 02/23/2021 3 6     Total Bilirubin 02/23/2021 0 20     eGFR 02/23/2021 96     Lipase 02/23/2021 116     LACTIC ACID 02/23/2021 0 8     Ventricular Rate 02/23/2021 47     Atrial Rate 02/23/2021 47     RI Interval 02/23/2021 154     QRSD Interval 02/23/2021 92     QT Interval 02/23/2021 442     QTC Interval 02/23/2021 391     P Axis 02/23/2021 49     QRS Axis 02/23/2021 6     T Wave Axis 02/23/2021 16          Radiology Results:   Pe Study With Ct Abdomen And Pelvis With Contrast    Result Date: 2/23/2021  Narrative: CT PULMONARY ANGIOGRAM OF THE CHEST AND CT ABDOMEN AND PELVIS WITH INTRAVENOUS CONTRAST INDICATION:  Left shoulder pain  Chest pain  Nausea  COMPARISON:  CT of abdomen pelvis August 17, 2020  TECHNIQUE:  CT examination of the chest, abdomen and pelvis was performed  Thin section CT angiographic technique was used in the chest in order to evaluate for pulmonary embolus and coronal 3D MIP postprocessing was performed on the acquisition scanner  Axial, sagittal, and coronal 2D reformatted images were created from the source data and submitted for interpretation  Radiation dose length product (DLP) for this visit:  1817 51 mGy-cm   This examination, like all CT scans performed in the [facility], was performed utilizing techniques to minimize radiation dose exposure, including the use of iterative reconstruction and automated exposure control   IV Contrast:  100 mL of iohexol (OMNIPAQUE)  was administered intravenously without immediate adverse reaction  Enteric Contrast:  Enteric contrast was not administered  FINDINGS: CHEST PULMONARY ARTERIAL TREE:  No pulmonary embolus is seen  LUNGS:  Lungs are clear  There is no tracheal or endobronchial lesion  PLEURA:  Unremarkable  HEART/AORTA:  The heart is normal in size  No pericardial effusion  There is common origin of the right brachiocephalic artery and left common carotid artery  MEDIASTINUM AND DANNY:  Unremarkable  CHEST WALL AND LOWER NECK:   Unremarkable  ABDOMEN LIVER/BILIARY TREE:  Unremarkable  GALLBLADDER:  Gallbladder is surgically absent  SPLEEN:  Unremarkable  PANCREAS:  Unremarkable  ADRENAL GLANDS:  Unremarkable  KIDNEYS/URETERS:  Unremarkable  No hydronephrosis  STOMACH AND BOWEL:  No bowel obstruction  Diverticulosis without evidence of diverticulitis  Mild diffuse thickening of the sigmoid colon likely due to chronic sequela of diverticulosis  APPENDIX:  A normal appendix was visualized  ABDOMINOPELVIC CAVITY:  No ascites  No pneumoperitoneum  No lymphadenopathy  VESSELS:  Unremarkable for patient's age  PELVIS REPRODUCTIVE ORGANS:  Unremarkable for patient's age  URINARY BLADDER:  Unremarkable  ABDOMINAL WALL/INGUINAL REGIONS:  A subcutaneous soft tissue nodule overlying the left pelvic rectus abdominis muscle has slightly increased in size now measuring 1 5 cm (series 3, image 81), previously 1 3 cm  Small fat-containing umbilical hernia  OSSEOUS STRUCTURES:  No acute fracture or destructive osseous lesion  Impression: 1  No evidence of pulmonary embolism  2   No focal consolidation the lungs  3   Diverticulosis without evidence of diverticulitis  4   Subcutaneous soft tissue nodule overlying the left pelvic rectus abdominis muscle has slightly increased in size now measuring 1 5 cm, previously 1 3 cm   Workstation performed: [id number]

## 2021-02-25 ENCOUNTER — TELEPHONE (OUTPATIENT)
Dept: GASTROENTEROLOGY | Facility: CLINIC | Age: 41
End: 2021-02-25

## 2021-03-02 ENCOUNTER — CONSULT (OUTPATIENT)
Dept: SURGERY | Facility: CLINIC | Age: 41
End: 2021-03-02
Payer: COMMERCIAL

## 2021-03-02 VITALS
DIASTOLIC BLOOD PRESSURE: 78 MMHG | WEIGHT: 293 LBS | TEMPERATURE: 98 F | BODY MASS INDEX: 39.68 KG/M2 | HEART RATE: 57 BPM | HEIGHT: 72 IN | SYSTOLIC BLOOD PRESSURE: 119 MMHG

## 2021-03-02 DIAGNOSIS — M79.89 SOFT TISSUE MASS: ICD-10-CM

## 2021-03-02 DIAGNOSIS — R93.89 ABNORMAL CT SCAN: Primary | ICD-10-CM

## 2021-03-02 PROCEDURE — 99244 OFF/OP CNSLTJ NEW/EST MOD 40: CPT | Performed by: SURGERY

## 2021-03-03 ENCOUNTER — PREP FOR PROCEDURE (OUTPATIENT)
Dept: INTERVENTIONAL RADIOLOGY/VASCULAR | Facility: CLINIC | Age: 41
End: 2021-03-03

## 2021-03-03 DIAGNOSIS — R10.84 GENERALIZED ABDOMINAL PAIN: Primary | ICD-10-CM

## 2021-03-04 ENCOUNTER — TELEPHONE (OUTPATIENT)
Dept: INTERVENTIONAL RADIOLOGY/VASCULAR | Facility: HOSPITAL | Age: 41
End: 2021-03-04

## 2021-03-05 ENCOUNTER — TELEPHONE (OUTPATIENT)
Dept: INTERVENTIONAL RADIOLOGY/VASCULAR | Facility: HOSPITAL | Age: 41
End: 2021-03-05

## 2021-03-05 NOTE — PROGRESS NOTES
Spoke with patient to set up abdominal biopsy  Appointment was made for 3/12/21 @ the [facility]  Plan to arrive for 8 am, have a ride to and from, and NPO after midnight the night prior  Answered, all questions consult complete

## 2021-03-07 PROBLEM — M79.89 SOFT TISSUE MASS: Status: ACTIVE | Noted: 2021-03-07

## 2021-03-08 NOTE — PROGRESS NOTES
Assessment/Plan:    Soft tissue mass   Patient with noted soft tissue  Mass of left lower quadrant  This is slightly enlarged on the most recent CT compared to the CT scan and August 2020  She does have generalized abdominal pain but worse in the bilateral lower quadrants  Unclear if this is specifically related to this mass however  The most intense pain is at the location of this mass  Unclear etiology  It could be an endometrioma although patient denies any history of endometriosis  Also could be a dermoid  I think it is best if we schedule a biopsy with intervention Radiology  She may and may not require excision  I will follow-up with patient after biopsies completed results are back  Diagnoses and all orders for this visit:    Abnormal CT scan  -     Ambulatory referral to Interventional Radiology; Future    Soft tissue mass          Subjective:      Patient ID: Aye Herrera is a 45 y o  female  41-year-old female with history of asthma, GERD, diabetes, presents to the office in consultation for evaluation of the abnormal finding on CT in addition to the generalized abdominal pain  Patient has had multiple CTs in the past 2 years  For workup abdominal pain  She recently saw GI for this generalized abdominal pain but greatest in the lower quadrants  That time was felt that this may be some underlying infectious process  There was some thickening of the sigmoid colon indicative likely previous diverticulitis  She is scheduled to undergo a colonoscopy with gastroenterology  However in addition to those findings of thickening of colon there was a mass of the soft tissue of the left lower quadrant  This measured approximately 1 5 cm and is slightly enlarged when compared to previous CT scan August 2020  Unclear etiology  The initial CT scan 2020 the radiology mentioned could not rule out possibility of endometriosis  Patient currently denies any signs or symptoms of endometriosis  Her generalized abdominal pain is intermittent  His greatest in the lower abdomen/left lower quadrant  There is associated nausea but no significant vomiting  She does tolerate a regular diet  She does have intermittent diarrhea and constipation  The following portions of the patient's history were reviewed and updated as appropriate:   She  has a past medical history of Asthma, Diabetes mellitus ([address] ), GERD (gastroesophageal reflux disease), and Gestational diabetes  She   Patient Active Problem List    Diagnosis Date Noted    Soft tissue mass 2021    Generalized abdominal pain 2021    Alternating constipation and diarrhea 2021    Nausea 2021    Prediabetes 10/07/2019    Encounter for gynecological examination (general) (routine) without abnormal findings 2018    Encounter for IUD removal 2018    Diverticulosis of intestine without bleeding 10/26/2018    Pelvic pain 2018    Carpal tunnel syndrome on left 2018    Mild intermittent asthma without complication     Hypothyroidism 10/29/2015    Obesity 10/29/2015    Seasonal allergies 10/15/2015     She  has a past surgical history that includes Tonsillectomy; Cholecystectomy; Dilation and curettage of uterus; Dilation and curettage of uterus;  section; pr lap,fulgurate/excise lesions (Right, 2017); Other surgical history (Right); pr revise median n/carpal tunnel surg (Right, 3/14/2018); and Salpingectomy  Her family history includes Anxiety disorder in her father; Arthritis in her father and mother; Atrial fibrillation in her mother; COPD in her mother; Depression in her father; Diabetes in her father; Heart disease in her father; Hodgkin's lymphoma in her brother; Hydrocephalus in her father; Hyperlipidemia in her father; Hypertension in her father and mother; Jaundice in her daughter; Lung disease in her maternal uncle; Migraines in her daughter;  Other in her daughter and mother; Stomach cancer in her maternal aunt; Urinary tract infection in her father  She  reports that she quit smoking about 6 years ago  Her smoking use included cigarettes  She has a 10 00 pack-year smoking history  She has never used smokeless tobacco  She reports current alcohol use  She reports that she does not use drugs    Current Outpatient Medications   Medication Sig Dispense Refill    albuterol (PROVENTIL HFA,VENTOLIN HFA) 90 mcg/act inhaler Inhale 2 puffs every 6 (six) hours as needed for wheezing      aspirin 81 mg chewable tablet Chew 81 mg daily      cholecalciferol (VITAMIN D3) 1,000 units tablet Take 1,000 Units by mouth daily      dicyclomine (BENTYL) 20 mg tablet Take 1 tablet (20 mg total) by mouth 2 (two) times a day (Patient not taking: Reported on 2/23/2021) 20 tablet 0    dicyclomine (BENTYL) 20 mg tablet Take 1 tablet (20 mg total) by mouth every 6 (six) hours as needed (cramps) for up to 20 doses 20 tablet 0    Insulin Glargine (BASAGLAR KWIKPEN SC) Inject under the skin      mometasone (ELOCON) 0 1 % lotion Apply topically daily Use for short duration only (Patient not taking: Reported on 2/10/2020) 60 mL 0    ondansetron (ZOFRAN-ODT) 4 mg disintegrating tablet Take 1 tablet (4 mg total) by mouth every 6 (six) hours as needed for nausea 20 tablet 0    ondansetron (ZOFRAN-ODT) 4 mg disintegrating tablet Take 1 tablet (4 mg total) by mouth every 8 (eight) hours as needed for nausea or vomiting for up to 20 doses 20 tablet 0    predniSONE 10 mg tablet Take 3 tabs BID X 2 days, 2 tabs BID X 2 days, 1 tab BID X 2 days, 1 tab daily X 2 days (Patient not taking: Reported on 8/17/2020) 26 tablet 0    Prenatal Vit-Fe Fumarate-FA (PRENATAL 1+1 PO) Take by mouth      promethazine (PHENERGAN) 25 mg tablet Take 1 tablet (25 mg total) by mouth every 6 (six) hours as needed for nausea or vomiting 20 tablet 0    Suprep Bowel Prep Kit 17 5-3 13-1 6 GM/177ML SOLN Take 180 mL by mouth once for 1 dose 180 mL 0     No current facility-administered medications for this visit  She is allergic to amoxicillin       Review of Systems        Review systems completed, all negative except as noted above HPI  Objective:      /78   Pulse 57   Temp 98 °F (36 7 °C)   Ht 6' (1 829 m)   Wt 134 kg (295 lb)   BMI 40 01 kg/m²          Physical Exam  Vitals signs reviewed  Constitutional:       General: She is not in acute distress  Appearance: Normal appearance  She is not ill-appearing, toxic-appearing or diaphoretic  HENT:      Head: Normocephalic and atraumatic  Right Ear: External ear normal       Left Ear: External ear normal    Eyes:      General:         Right eye: No discharge  Left eye: No discharge  Neck:      Musculoskeletal: Normal range of motion and neck supple  Cardiovascular:      Rate and Rhythm: Normal rate  Heart sounds: Normal heart sounds  No murmur  No friction rub  No gallop  Pulmonary:      Effort: Pulmonary effort is normal  No respiratory distress  Breath sounds: Normal breath sounds  No stridor  No wheezing or rhonchi  Abdominal:      General: There is no distension  Palpations: There is mass (  Small palpable mass left lower quadrant non mobile)  Tenderness: There is abdominal tenderness ( right lower and left lower quadrant)  Hernia: A hernia ( small umbilical hernia) is present  Musculoskeletal: Normal range of motion  General: No swelling or tenderness  Right lower leg: No edema  Left lower leg: No edema  Lymphadenopathy:      Cervical: No cervical adenopathy  Skin:     General: Skin is warm and dry  Coloration: Skin is not pale  Findings: No bruising or erythema  Neurological:      General: No focal deficit present  Mental Status: She is alert and oriented to person, place, and time  Cranial Nerves: No cranial nerve deficit     Psychiatric:         Mood and Affect: Mood normal          Behavior: Behavior normal          Thought Content: Thought content normal          Judgment: Judgment normal           imaging:    CT scans were personally reviewed by me

## 2021-03-08 NOTE — ASSESSMENT & PLAN NOTE
Patient with noted soft tissue  Mass of left lower quadrant  This is slightly enlarged on the most recent CT compared to the CT scan and August 2020  She does have generalized abdominal pain but worse in the bilateral lower quadrants  Unclear if this is specifically related to this mass however  The most intense pain is at the location of this mass  Unclear etiology  It could be an endometrioma although patient denies any history of endometriosis  Also could be a dermoid  I think it is best if we schedule a biopsy with intervention Radiology  She may and may not require excision  I will follow-up with patient after biopsies completed results are back

## 2021-03-12 ENCOUNTER — HOSPITAL ENCOUNTER (OUTPATIENT)
Dept: INTERVENTIONAL RADIOLOGY/VASCULAR | Facility: HOSPITAL | Age: 41
Discharge: HOME | End: 2021-03-12
Attending: RADIOLOGY
Payer: COMMERCIAL

## 2021-03-12 DIAGNOSIS — R10.84 GENERALIZED ABDOMINAL PAIN: ICD-10-CM

## 2021-03-12 PROCEDURE — 76942 ECHO GUIDE FOR BIOPSY: CPT | Performed by: RADIOLOGY

## 2021-03-12 PROCEDURE — 88305 TISSUE EXAM BY PATHOLOGIST: CPT | Performed by: PATHOLOGY

## 2021-03-12 PROCEDURE — 76942 ECHO GUIDE FOR BIOPSY: CPT

## 2021-03-12 PROCEDURE — 20206 BIOPSY MUSCLE PERQ NEEDLE: CPT | Performed by: RADIOLOGY

## 2021-03-12 PROCEDURE — 49180 BIOPSY ABDOMINAL MASS: CPT

## 2021-03-12 RX ORDER — LIDOCAINE HYDROCHLORIDE 10 MG/ML
INJECTION, SOLUTION EPIDURAL; INFILTRATION; INTRACAUDAL; PERINEURAL CODE/TRAUMA/SEDATION MEDICATION
Status: COMPLETED | OUTPATIENT
Start: 2021-03-12 | End: 2021-03-12

## 2021-03-12 RX ORDER — SODIUM CHLORIDE, SODIUM LACTATE, POTASSIUM CHLORIDE, CALCIUM CHLORIDE 600; 310; 30; 20 MG/100ML; MG/100ML; MG/100ML; MG/100ML
50 INJECTION, SOLUTION INTRAVENOUS CONTINUOUS
Status: CANCELLED | OUTPATIENT
Start: 2021-03-12

## 2021-03-12 RX ADMIN — LIDOCAINE HYDROCHLORIDE 10 ML: 10 INJECTION, SOLUTION EPIDURAL; INFILTRATION; INTRACAUDAL; PERINEURAL at 08:53

## 2021-03-12 NOTE — DISCHARGE INSTRUCTIONS
[address]  Interventional Radiology  Dr [address]: [phone] (M-F 7:30am - 4:00pm)  Off hours or no answer: [phone] (Ask for IR on call)               [address] after your procedure:    1  Limit your activities for 24 hours after your biopsy  3  Return to your normal diet  Small sips of flat soda will help with mild nausea  4  Remove band-aid or dressing 24 hours after procedure  Contact Interventional Radiology at [phone] [facility] PATIENTS: Contact Interventional Radiology at [phone]) [facility] PATIENTS: Contact Interventional Radiology at [phone]) if:    1  Difficulty breathing, nausea or vomiting  2  Chills or fever above 101 degrees F      3  Pain at biopsy site not relieved by medication  4  Develop any redness, swelling, heat, unusual drainage, heavy bruising or bleeding from biopsy site

## 2021-03-22 ENCOUNTER — TELEPHONE (OUTPATIENT)
Dept: SURGERY | Facility: CLINIC | Age: 41
End: 2021-03-22

## 2021-03-22 NOTE — TELEPHONE ENCOUNTER
Spoke to patient at length regarding pathology revealed results of her recent biopsy of the subcutaneous/soft tissue mass  Pathology showing endometriosis  Patient denies any history of intra-abdominal endometriosis in the past   Discussion was had regarding removal of this mass to help facilitate reduction  Of her lower abdominal pain related to this mass  Brief discussion was  Had regarding endometriosis and  Various treatments 1 of which is surgical   Patient states that she would like to discuss this with gyn to see what treatments are available and if surgery to treat the endometriosis is required  If she requires gyn surgery that she would like to do a combined surgery with me to remove the soft tissue mass in addition to the gyn surgery  Thus patient will contact gyn for an appointment and discussion  I informed her I am always available and totally willing to do a combined surgery if needed

## 2021-03-25 ENCOUNTER — OFFICE VISIT (OUTPATIENT)
Dept: OBGYN CLINIC | Facility: CLINIC | Age: 41
End: 2021-03-25
Payer: COMMERCIAL

## 2021-03-25 ENCOUNTER — TELEPHONE (OUTPATIENT)
Dept: SURGERY | Facility: CLINIC | Age: 41
End: 2021-03-25

## 2021-03-25 VITALS — SYSTOLIC BLOOD PRESSURE: 124 MMHG | BODY MASS INDEX: 40.63 KG/M2 | DIASTOLIC BLOOD PRESSURE: 78 MMHG | WEIGHT: 293 LBS

## 2021-03-25 DIAGNOSIS — N80.8 OTHER ENDOMETRIOSIS: Primary | ICD-10-CM

## 2021-03-25 PROCEDURE — 99213 OFFICE O/P EST LOW 20 MIN: CPT | Performed by: OBSTETRICS & GYNECOLOGY

## 2021-03-25 NOTE — PROGRESS NOTES
Assessment/Plan:    Endometriosis -  No prior evidence seen laparoscopically - but could have been microscopic  We discussed that without visible disease surgical intervention is not necessary  She would like to have the abdominal wall nodule removed to see if this is helpful in the cyclic pain she has with her cycles  If this controls her pain no further intervention is needed  She would prefer to avoid hysterectomy at this time  We did also discuss hormonal therapy as effective for endometriosis but she would like to completely avoid this due to her daughters recent PE  We did discuss that progesterone only options are safe with that history but she still declines  She would also like to await to see her daughters test results to see if there is anything genetic that she should be tested for  She will follow up with our office as needed/or if she decides to purse tubal ligation  Subjective:      Patient ID: Martha Helm is a 45 y o  female  Jay Whipple presents today for discussion of endometriosis  She had a LLQ abdominal wall nodule that was identified and increased slightly in size on a recent CT scan  This underwent IR biopsy - for which pathology was consistent with endometriosis  She is planning for removal of this nodule with general surgery  She has pain in this area specifically during her menses  She has a history of salpingectomy for large paratubal cyst - operative note reviewed and was without evidence of endometriosis  She has completed childbearing at this time  She may consider a tubal ligation at some point in the future  Of significance - Yolanda's daughter was diagnosed with bilateral PE while on oral contraceptives in November, and has just recently finished her eliquis  She did have some abnormal workup - but needs to be repeated after she had completed anticoagulation  Jay Whipple is not having any other significant pelvic pain or other concerns  Past Medical History:   Diagnosis Date    Asthma     last assessed 10/15/15    Diabetes mellitus ([facility] [address] )     gestational      GERD (gastroesophageal reflux disease)     last assessed 10/15/15    Gestational diabetes      Past Surgical History:   Procedure Laterality Date     SECTION      last assessed 14    CHOLECYSTECTOMY      last assessed 14    DILATION AND CURETTAGE OF UTERUS      DILATION AND CURETTAGE OF UTERUS      IR BIOPSY OTHER  3/12/2021    OTHER SURGICAL HISTORY Right     right fallopian tube removed    AK LAP,FULGURATE/EXCISE LESIONS Right 2017    Procedure: LAPAROSCOPIC OVARIAN CYSTECTOMY VERSUS OOPHERECTOMY;  Surgeon: Carole Villa MD;  [facility];  Service: Gynecology    AK REVISE MEDIAN N/CARPAL TUNNEL SURG Right 3/14/2018    Procedure: RELEASE CARPAL TUNNEL;  Surgeon: Ayse Wynne MD;  Location: [facility];  Service: Orthopedics    SALPINGECTOMY      right side    TONSILLECTOMY      last assessed 14         Review of Systems   Gastrointestinal: Positive for abdominal pain  Genitourinary: Positive for pelvic pain  Negative for menstrual problem, vaginal bleeding, vaginal discharge and vaginal pain  Objective:      /78   Wt 136 kg (299 lb 9 6 oz)   LMP 2021 (Exact Date)   BMI 40 63 kg/m²          Physical Exam  Vitals signs reviewed  Constitutional:       Appearance: Normal appearance  She is obese  Abdominal:      Palpations: Abdomen is soft  Tenderness: There is abdominal tenderness (LLQ)  Neurological:      Mental Status: She is alert     Psychiatric:         Mood and Affect: Mood normal          Behavior: Behavior normal

## 2021-03-30 ENCOUNTER — OFFICE VISIT (OUTPATIENT)
Dept: SURGERY | Facility: CLINIC | Age: 41
End: 2021-03-30
Payer: COMMERCIAL

## 2021-03-30 VITALS
WEIGHT: 293 LBS | BODY MASS INDEX: 39.68 KG/M2 | HEART RATE: 60 BPM | DIASTOLIC BLOOD PRESSURE: 70 MMHG | TEMPERATURE: 97.7 F | SYSTOLIC BLOOD PRESSURE: 121 MMHG | HEIGHT: 72 IN

## 2021-03-30 DIAGNOSIS — M79.89 SOFT TISSUE MASS: Primary | ICD-10-CM

## 2021-03-30 PROCEDURE — 99213 OFFICE O/P EST LOW 20 MIN: CPT | Performed by: SURGERY

## 2021-03-30 RX ORDER — CHLORHEXIDINE GLUCONATE 4 G/100ML
SOLUTION TOPICAL DAILY PRN
Status: CANCELLED | OUTPATIENT
Start: 2021-03-30

## 2021-03-30 RX ORDER — CLINDAMYCIN PHOSPHATE 900 MG/50ML
900 INJECTION INTRAVENOUS ONCE
Status: CANCELLED | OUTPATIENT
Start: 2021-05-05

## 2021-03-30 RX ORDER — OMEPRAZOLE 20 MG/1
20 CAPSULE, DELAYED RELEASE ORAL DAILY
COMMUNITY
Start: 2021-03-11

## 2021-03-30 RX ORDER — SODIUM CHLORIDE, SODIUM LACTATE, POTASSIUM CHLORIDE, CALCIUM CHLORIDE 600; 310; 30; 20 MG/100ML; MG/100ML; MG/100ML; MG/100ML
125 INJECTION, SOLUTION INTRAVENOUS CONTINUOUS
Status: CANCELLED | OUTPATIENT
Start: 2021-05-05

## 2021-03-30 NOTE — H&P
Assessment/Plan:    Soft tissue mass  Biopsy of soft tissue mass showing likely endometrioma  Patient still with significant tenderness in the left lower quadrant overlying mass  Patient wished to have this removed I think this is reasonable  The patient will be scheduled for excision of endometrioma under sedation in the operating room  The procedure self including all associated risks and benefits were discussed the patient great length  The patient verbalized understands risks and is willing to proceed, consent was signed  She is young and otherwise healthy will not require any additional preoperative  Workup  Diagnoses and all orders for this visit:    Soft tissue mass    Other orders  -     omeprazole (PriLOSEC) 20 mg delayed release capsule; Take 20 mg by mouth daily          Subjective:      Patient ID: Jaycee Kennedy is a 45 y o  female  78-year-old female who presents for follow-up after undergoing biopsy of soft tissue mass of left lower quadrant  Biopsy showing endometriosis  Patient still with significant tenderness overlying the left lower quadrant  She states at this point she would like to have this removed  She did recently see her gyn physician who states that on review of previous operative interventions there is no evidence or nodes about endometriosis within the intra-abdominal cavity  No intervention from gyn at this point  Patient denies any other associated symptoms  No nausea vomiting no fevers or chills  Just with the left lower quadrant pain  The following portions of the patient's history were reviewed and updated as appropriate:   She  has a past medical history of Asthma, Diabetes mellitus ([address] ), GERD (gastroesophageal reflux disease), and Gestational diabetes    She   Patient Active Problem List    Diagnosis Date Noted    Soft tissue mass 03/07/2021    Generalized abdominal pain 02/24/2021    Alternating constipation and diarrhea 02/24/2021    Nausea 2021    Prediabetes 10/07/2019    Encounter for gynecological examination (general) (routine) without abnormal findings 2018    Encounter for IUD removal 2018    Diverticulosis of intestine without bleeding 10/26/2018    Pelvic pain 2018    Carpal tunnel syndrome on left 2018    Mild intermittent asthma without complication     Hypothyroidism 10/29/2015    Obesity 10/29/2015    Seasonal allergies 10/15/2015     She  has a past surgical history that includes Tonsillectomy; Cholecystectomy; Dilation and curettage of uterus; Dilation and curettage of uterus;  section; pr lap,fulgurate/excise lesions (Right, 2017); Other surgical history (Right); pr revise median n/carpal tunnel surg (Right, 3/14/2018); Salpingectomy; and IR biopsy other (3/12/2021)  Her family history includes Anxiety disorder in her father; Arthritis in her father and mother; Atrial fibrillation in her mother; COPD in her mother; Depression in her father; Diabetes in her father; Heart disease in her father; Hodgkin's lymphoma in her brother; Hydrocephalus in her father; Hyperlipidemia in her father; Hypertension in her father and mother; Jaundice in her daughter; Lung disease in her maternal uncle; Migraines in her daughter; Other in her daughter and mother; Stomach cancer in her maternal aunt; Urinary tract infection in her father  She  reports that she quit smoking about 6 years ago  Her smoking use included cigarettes  She has a 10 00 pack-year smoking history  She has never used smokeless tobacco  She reports current alcohol use  She reports that she does not use drugs    Current Outpatient Medications   Medication Sig Dispense Refill    albuterol (PROVENTIL HFA,VENTOLIN HFA) 90 mcg/act inhaler Inhale 2 puffs every 6 (six) hours as needed for wheezing      aspirin 81 mg chewable tablet Chew 81 mg daily      cholecalciferol (VITAMIN D3) 1,000 units tablet Take 1,000 Units by mouth daily      Insulin Glargine (BASAGLAR KWIKPEN SC) Inject under the skin      omeprazole (PriLOSEC) 20 mg delayed release capsule Take 20 mg by mouth daily      ondansetron (ZOFRAN-ODT) 4 mg disintegrating tablet Take 1 tablet (4 mg total) by mouth every 8 (eight) hours as needed for nausea or vomiting for up to 20 doses 20 tablet 0    Prenatal Vit-Fe Fumarate-FA (PRENATAL 1+1 PO) Take by mouth      dicyclomine (BENTYL) 20 mg tablet Take 1 tablet (20 mg total) by mouth 2 (two) times a day (Patient not taking: Reported on 2/23/2021) 20 tablet 0    dicyclomine (BENTYL) 20 mg tablet Take 1 tablet (20 mg total) by mouth every 6 (six) hours as needed (cramps) for up to 20 doses (Patient not taking: Reported on 3/25/2021) 20 tablet 0    mometasone (ELOCON) 0 1 % lotion Apply topically daily Use for short duration only (Patient not taking: Reported on 2/10/2020) 60 mL 0    ondansetron (ZOFRAN-ODT) 4 mg disintegrating tablet Take 1 tablet (4 mg total) by mouth every 6 (six) hours as needed for nausea (Patient not taking: Reported on 3/25/2021) 20 tablet 0    predniSONE 10 mg tablet Take 3 tabs BID X 2 days, 2 tabs BID X 2 days, 1 tab BID X 2 days, 1 tab daily X 2 days (Patient not taking: Reported on 8/17/2020) 26 tablet 0    promethazine (PHENERGAN) 25 mg tablet Take 1 tablet (25 mg total) by mouth every 6 (six) hours as needed for nausea or vomiting (Patient not taking: Reported on 3/25/2021) 20 tablet 0    Suprep Bowel Prep Kit 17 5-3 13-1 6 GM/177ML SOLN Take 180 mL by mouth once for 1 dose 180 mL 0     No current facility-administered medications for this visit  She is allergic to amoxicillin       Review of Systems   Constitutional: Negative for activity change, appetite change, chills, diaphoresis, fatigue, fever and unexpected weight change  Gastrointestinal: Positive for abdominal pain  Skin: Negative for color change, pallor, rash and wound           Objective:      /70 (BP Location: Left arm, Patient Position: Sitting, Cuff Size: Extra-Large)   Pulse 60   Temp 97 7 °F (36 5 °C) (Tympanic)   Ht 6' (1 829 m)   Wt 135 kg (297 lb)   LMP 03/07/2021 (Exact Date)   BMI 40 28 kg/m²          Physical Exam  Vitals signs reviewed  Constitutional:       General: She is not in acute distress  Appearance: Normal appearance  She is not ill-appearing, toxic-appearing or diaphoretic  HENT:      Head: Normocephalic and atraumatic  Right Ear: External ear normal       Left Ear: External ear normal    Eyes:      General: No scleral icterus  Right eye: No discharge  Left eye: No discharge  Neck:      Musculoskeletal: Normal range of motion  Cardiovascular:      Rate and Rhythm: Normal rate and regular rhythm  Heart sounds: Normal heart sounds  No murmur  No friction rub  No gallop  Pulmonary:      Effort: Pulmonary effort is normal  No respiratory distress  Breath sounds: Normal breath sounds  No stridor  No wheezing, rhonchi or rales  Abdominal:      General: There is no distension  Palpations: Abdomen is soft  There is mass ( small palpable  mass left lower quadrant)  Tenderness: There is abdominal tenderness (  Overlying the mass)  Musculoskeletal: Normal range of motion  General: No swelling or tenderness  Right lower leg: No edema  Left lower leg: No edema  Skin:     General: Skin is warm and dry  Coloration: Skin is not jaundiced or pale  Findings: No bruising or erythema  Neurological:      General: No focal deficit present  Mental Status: She is alert and oriented to person, place, and time  Cranial Nerves: No cranial nerve deficit  Psychiatric:         Mood and Affect: Mood normal          Behavior: Behavior normal          Thought Content:  Thought content normal          Judgment: Judgment normal

## 2021-03-30 NOTE — LETTER
March 30, 2021     Patient: Mika Zepeda   YOB: 1980   Date of Visit: 3/30/2021       To Whom it May Concern:    Mika Zepeda is under my professional care  She was seen in my office on 3/30/2021  She can return to work 3/31/2021  If you have any questions or concerns, please don't hesitate to call           Sincerely,          Castro Abarca DO        CC: No Recipients

## 2021-03-30 NOTE — ASSESSMENT & PLAN NOTE
Biopsy of soft tissue mass showing likely endometrioma  Patient still with significant tenderness in the left lower quadrant overlying mass  Patient wished to have this removed I think this is reasonable  The patient will be scheduled for excision of endometrioma under sedation in the operating room  The procedure self including all associated risks and benefits were discussed the patient great length  The patient verbalized understands risks and is willing to proceed, consent was signed  She is young and otherwise healthy will not require any additional preoperative  Workup

## 2021-03-30 NOTE — PROGRESS NOTES
Assessment/Plan:    Soft tissue mass  Biopsy of soft tissue mass showing likely endometrioma  Patient still with significant tenderness in the left lower quadrant overlying mass  Patient wished to have this removed I think this is reasonable  The patient will be scheduled for excision of endometrioma under sedation in the operating room  The procedure self including all associated risks and benefits were discussed the patient great length  The patient verbalized understands risks and is willing to proceed, consent was signed  She is young and otherwise healthy will not require any additional preoperative  Workup  Diagnoses and all orders for this visit:    Soft tissue mass    Other orders  -     omeprazole (PriLOSEC) 20 mg delayed release capsule; Take 20 mg by mouth daily          Subjective:      Patient ID: Reyna Meneses is a 45 y o  female  80-year-old female who presents for follow-up after undergoing biopsy of soft tissue mass of left lower quadrant  Biopsy showing endometriosis  Patient still with significant tenderness overlying the left lower quadrant  She states at this point she would like to have this removed  She did recently see her gyn physician who states that on review of previous operative interventions there is no evidence or nodes about endometriosis within the intra-abdominal cavity  No intervention from gyn at this point  Patient denies any other associated symptoms  No nausea vomiting no fevers or chills  Just with the left lower quadrant pain  The following portions of the patient's history were reviewed and updated as appropriate:   She  has a past medical history of Asthma, Diabetes mellitus ([address] ), GERD (gastroesophageal reflux disease), and Gestational diabetes    She   Patient Active Problem List    Diagnosis Date Noted    Soft tissue mass 03/07/2021    Generalized abdominal pain 02/24/2021    Alternating constipation and diarrhea 02/24/2021    Nausea 2021    Prediabetes 10/07/2019    Encounter for gynecological examination (general) (routine) without abnormal findings 2018    Encounter for IUD removal 2018    Diverticulosis of intestine without bleeding 10/26/2018    Pelvic pain 2018    Carpal tunnel syndrome on left 2018    Mild intermittent asthma without complication     Hypothyroidism 10/29/2015    Obesity 10/29/2015    Seasonal allergies 10/15/2015     She  has a past surgical history that includes Tonsillectomy; Cholecystectomy; Dilation and curettage of uterus; Dilation and curettage of uterus;  section; pr lap,fulgurate/excise lesions (Right, 2017); Other surgical history (Right); pr revise median n/carpal tunnel surg (Right, 3/14/2018); Salpingectomy; and IR biopsy other (3/12/2021)  Her family history includes Anxiety disorder in her father; Arthritis in her father and mother; Atrial fibrillation in her mother; COPD in her mother; Depression in her father; Diabetes in her father; Heart disease in her father; Hodgkin's lymphoma in her brother; Hydrocephalus in her father; Hyperlipidemia in her father; Hypertension in her father and mother; Jaundice in her daughter; Lung disease in her maternal uncle; Migraines in her daughter; Other in her daughter and mother; Stomach cancer in her maternal aunt; Urinary tract infection in her father  She  reports that she quit smoking about 6 years ago  Her smoking use included cigarettes  She has a 10 00 pack-year smoking history  She has never used smokeless tobacco  She reports current alcohol use  She reports that she does not use drugs    Current Outpatient Medications   Medication Sig Dispense Refill    albuterol (PROVENTIL HFA,VENTOLIN HFA) 90 mcg/act inhaler Inhale 2 puffs every 6 (six) hours as needed for wheezing      aspirin 81 mg chewable tablet Chew 81 mg daily      cholecalciferol (VITAMIN D3) 1,000 units tablet Take 1,000 Units by mouth daily      Insulin Glargine (BASAGLAR KWIKPEN SC) Inject under the skin      omeprazole (PriLOSEC) 20 mg delayed release capsule Take 20 mg by mouth daily      ondansetron (ZOFRAN-ODT) 4 mg disintegrating tablet Take 1 tablet (4 mg total) by mouth every 8 (eight) hours as needed for nausea or vomiting for up to 20 doses 20 tablet 0    Prenatal Vit-Fe Fumarate-FA (PRENATAL 1+1 PO) Take by mouth      dicyclomine (BENTYL) 20 mg tablet Take 1 tablet (20 mg total) by mouth 2 (two) times a day (Patient not taking: Reported on 2/23/2021) 20 tablet 0    dicyclomine (BENTYL) 20 mg tablet Take 1 tablet (20 mg total) by mouth every 6 (six) hours as needed (cramps) for up to 20 doses (Patient not taking: Reported on 3/25/2021) 20 tablet 0    mometasone (ELOCON) 0 1 % lotion Apply topically daily Use for short duration only (Patient not taking: Reported on 2/10/2020) 60 mL 0    ondansetron (ZOFRAN-ODT) 4 mg disintegrating tablet Take 1 tablet (4 mg total) by mouth every 6 (six) hours as needed for nausea (Patient not taking: Reported on 3/25/2021) 20 tablet 0    predniSONE 10 mg tablet Take 3 tabs BID X 2 days, 2 tabs BID X 2 days, 1 tab BID X 2 days, 1 tab daily X 2 days (Patient not taking: Reported on 8/17/2020) 26 tablet 0    promethazine (PHENERGAN) 25 mg tablet Take 1 tablet (25 mg total) by mouth every 6 (six) hours as needed for nausea or vomiting (Patient not taking: Reported on 3/25/2021) 20 tablet 0    Suprep Bowel Prep Kit 17 5-3 13-1 6 GM/177ML SOLN Take 180 mL by mouth once for 1 dose 180 mL 0     No current facility-administered medications for this visit  She is allergic to amoxicillin       Review of Systems   Constitutional: Negative for activity change, appetite change, chills, diaphoresis, fatigue, fever and unexpected weight change  Gastrointestinal: Positive for abdominal pain  Skin: Negative for color change, pallor, rash and wound           Objective:      /70 (BP Location: Left arm, Patient Position: Sitting, Cuff Size: Extra-Large)   Pulse 60   Temp 97 7 °F (36 5 °C) (Tympanic)   Ht 6' (1 829 m)   Wt 135 kg (297 lb)   LMP 03/07/2021 (Exact Date)   BMI 40 28 kg/m²          Physical Exam  Vitals signs reviewed  Constitutional:       General: She is not in acute distress  Appearance: Normal appearance  She is not ill-appearing, toxic-appearing or diaphoretic  HENT:      Head: Normocephalic and atraumatic  Right Ear: External ear normal       Left Ear: External ear normal    Eyes:      General: No scleral icterus  Right eye: No discharge  Left eye: No discharge  Neck:      Musculoskeletal: Normal range of motion  Cardiovascular:      Rate and Rhythm: Normal rate and regular rhythm  Heart sounds: Normal heart sounds  No murmur  No friction rub  No gallop  Pulmonary:      Effort: Pulmonary effort is normal  No respiratory distress  Breath sounds: Normal breath sounds  No stridor  No wheezing, rhonchi or rales  Abdominal:      General: There is no distension  Palpations: Abdomen is soft  There is mass ( small palpable  mass left lower quadrant)  Tenderness: There is abdominal tenderness (  Overlying the mass)  Musculoskeletal: Normal range of motion  General: No swelling or tenderness  Right lower leg: No edema  Left lower leg: No edema  Skin:     General: Skin is warm and dry  Coloration: Skin is not jaundiced or pale  Findings: No bruising or erythema  Neurological:      General: No focal deficit present  Mental Status: She is alert and oriented to person, place, and time  Cranial Nerves: No cranial nerve deficit  Psychiatric:         Mood and Affect: Mood normal          Behavior: Behavior normal          Thought Content:  Thought content normal          Judgment: Judgment normal

## 2021-04-12 ENCOUNTER — TELEPHONE (OUTPATIENT)
Dept: SURGERY | Facility: CLINIC | Age: 41
End: 2021-04-12

## 2021-04-16 ENCOUNTER — HOSPITAL ENCOUNTER (OUTPATIENT)
Dept: PERIOP | Facility: HOSPITAL | Age: 41
Setting detail: OUTPATIENT SURGERY
Discharge: HOME | End: 2021-04-16
Attending: INTERNAL MEDICINE
Payer: COMMERCIAL

## 2021-04-16 ENCOUNTER — ANESTHESIA EVENT (OUTPATIENT)
Dept: PERIOP | Facility: HOSPITAL | Age: 41
End: 2021-04-16

## 2021-04-16 ENCOUNTER — ANESTHESIA (OUTPATIENT)
Dept: PERIOP | Facility: HOSPITAL | Age: 41
End: 2021-04-16

## 2021-04-16 VITALS
DIASTOLIC BLOOD PRESSURE: 52 MMHG | HEART RATE: 57 BPM | OXYGEN SATURATION: 99 % | TEMPERATURE: 98.2 F | RESPIRATION RATE: 18 BRPM | SYSTOLIC BLOOD PRESSURE: 106 MMHG

## 2021-04-16 DIAGNOSIS — R11.0 NAUSEA: ICD-10-CM

## 2021-04-16 DIAGNOSIS — K21.9 GASTROESOPHAGEAL REFLUX DISEASE WITHOUT ESOPHAGITIS: ICD-10-CM

## 2021-04-16 DIAGNOSIS — R10.84 GENERALIZED ABDOMINAL PAIN: ICD-10-CM

## 2021-04-16 DIAGNOSIS — R19.8 ALTERNATING CONSTIPATION AND DIARRHEA: ICD-10-CM

## 2021-04-16 LAB
EXT PREGNANCY TEST URINE: NEGATIVE
EXT. CONTROL: NORMAL

## 2021-04-16 PROCEDURE — 81025 URINE PREGNANCY TEST: CPT | Performed by: STUDENT IN AN ORGANIZED HEALTH CARE EDUCATION/TRAINING PROGRAM

## 2021-04-16 PROCEDURE — 88305 TISSUE EXAM BY PATHOLOGIST: CPT | Performed by: PATHOLOGY

## 2021-04-16 PROCEDURE — 43239 EGD BIOPSY SINGLE/MULTIPLE: CPT | Performed by: INTERNAL MEDICINE

## 2021-04-16 PROCEDURE — 45380 COLONOSCOPY AND BIOPSY: CPT | Performed by: INTERNAL MEDICINE

## 2021-04-16 RX ORDER — PROPOFOL 10 MG/ML
INJECTION, EMULSION INTRAVENOUS AS NEEDED
Status: DISCONTINUED | OUTPATIENT
Start: 2021-04-16 | End: 2021-04-16

## 2021-04-16 RX ORDER — LIDOCAINE HYDROCHLORIDE 10 MG/ML
INJECTION, SOLUTION EPIDURAL; INFILTRATION; INTRACAUDAL; PERINEURAL AS NEEDED
Status: DISCONTINUED | OUTPATIENT
Start: 2021-04-16 | End: 2021-04-16

## 2021-04-16 RX ORDER — LIDOCAINE HYDROCHLORIDE 10 MG/ML
0.5 INJECTION, SOLUTION EPIDURAL; INFILTRATION; INTRACAUDAL; PERINEURAL ONCE AS NEEDED
Status: DISCONTINUED | OUTPATIENT
Start: 2021-04-16 | End: 2021-04-20 | Stop reason: HOSPADM

## 2021-04-16 RX ORDER — SODIUM CHLORIDE, SODIUM LACTATE, POTASSIUM CHLORIDE, CALCIUM CHLORIDE 600; 310; 30; 20 MG/100ML; MG/100ML; MG/100ML; MG/100ML
50 INJECTION, SOLUTION INTRAVENOUS CONTINUOUS
Status: DISCONTINUED | OUTPATIENT
Start: 2021-04-16 | End: 2021-04-20 | Stop reason: HOSPADM

## 2021-04-16 RX ADMIN — PROPOFOL 100 MG: 10 INJECTION, EMULSION INTRAVENOUS at 12:27

## 2021-04-16 RX ADMIN — SODIUM CHLORIDE, SODIUM LACTATE, POTASSIUM CHLORIDE, AND CALCIUM CHLORIDE: .6; .31; .03; .02 INJECTION, SOLUTION INTRAVENOUS at 12:00

## 2021-04-16 RX ADMIN — LIDOCAINE HYDROCHLORIDE 50 MG: 10 INJECTION, SOLUTION EPIDURAL; INFILTRATION; INTRACAUDAL; PERINEURAL at 12:14

## 2021-04-16 RX ADMIN — SODIUM CHLORIDE, SODIUM LACTATE, POTASSIUM CHLORIDE, AND CALCIUM CHLORIDE 50 ML/HR: .6; .31; .03; .02 INJECTION, SOLUTION INTRAVENOUS at 11:09

## 2021-04-16 RX ADMIN — PROPOFOL 100 MG: 10 INJECTION, EMULSION INTRAVENOUS at 12:20

## 2021-04-16 RX ADMIN — PROPOFOL 150 MG: 10 INJECTION, EMULSION INTRAVENOUS at 12:15

## 2021-04-16 NOTE — ANESTHESIA PREPROCEDURE EVALUATION
Procedure:  COLONOSCOPY  EGD    Relevant Problems   ANESTHESIA (within normal limits)      ENDO   (+) Hypothyroidism      GI/HEPATIC   (+) Gastroesophageal reflux disease      PULMONARY   (+) Mild intermittent asthma without complication      Other   (+) Alternating constipation and diarrhea   (+) Generalized abdominal pain   (+) Morbid obesity (HCC)        Physical Exam    Airway    Mallampati score: III  TM Distance: >3 FB  Neck ROM: full     Dental   No notable dental hx     Cardiovascular  Cardiovascular exam normal    Pulmonary  Pulmonary exam normal     Other Findings        Anesthesia Plan  ASA Score- 3     Anesthesia Type- IV sedation with anesthesia with ASA Monitors  Additional Monitors:   Airway Plan:     Comment: I, Irina Fenton MD, discussed risks (reviewed with patient on the anesthesia consent form), benefits and alternatives of monitored sedation including the possibility under sedation to have recall or mild discomfort          Plan Factors-    Chart reviewed  EKG reviewed  Patient summary reviewed  Induction- intravenous  Postoperative Plan-     Informed Consent- Anesthetic plan and risks discussed with patient  I personally reviewed this patient with the CRNA  Discussed and agreed on the Anesthesia Plan with the CRNA  Master Shukla

## 2021-04-16 NOTE — H&P
History and Physical -  [facility]  Nancy Karimi 45 y o  female MRN: [id number]                  HPI: Nancy Karimi is a 45y o  year old female who presents for  EGD/ colonoscopy for abdominal pain, nausea, GERD and alternating bowel habits  No previous colonoscopy  REVIEW OF SYSTEMS: Per the HPI, and otherwise unremarkable      Historical Information   Past Medical History:   Diagnosis Date    Asthma     last assessed 10/15/15    Diabetes mellitus ([facility] [address] )     gestational      GERD (gastroesophageal reflux disease)     last assessed 10/15/15    Gestational diabetes      Past Surgical History:   Procedure Laterality Date     SECTION      last assessed 14    CHOLECYSTECTOMY      last assessed 14    DILATION AND CURETTAGE OF UTERUS      DILATION AND CURETTAGE OF UTERUS      EGD      IR BIOPSY OTHER  3/12/2021    OTHER SURGICAL HISTORY Right     right fallopian tube removed    ME LAP,FULGURATE/EXCISE LESIONS Right 2017    Procedure: LAPAROSCOPIC OVARIAN CYSTECTOMY VERSUS OOPHERECTOMY;  Surgeon: Nixon Joseph MD;  [facility];  Service: Gynecology    ME REVISE MEDIAN N/CARPAL TUNNEL SURG Right 3/14/2018    Procedure: RELEASE CARPAL TUNNEL;  Surgeon: Malick Desouza MD;  [facility];  Service: Orthopedics    SALPINGECTOMY      right side    TONSILLECTOMY      last assessed 14     Social History   Social History     Substance and Sexual Activity   Alcohol Use Yes    Comment: rarely; social     Social History     Substance and Sexual Activity   Drug Use No     Social History     Tobacco Use   Smoking Status Former Smoker    Packs/day: 0 50    Years: 20 00    Pack years: 10 00    Types: Cigarettes    Quit date:     Years since quittin 2   Smokeless Tobacco Never Used   Tobacco Comment         Family History   Problem Relation Age of Onset    Arthritis Mother     Atrial fibrillation Mother     COPD Mother    [employer] Hypertension Mother     Other Mother         urinary incontinence    Depression Father     Anxiety disorder Father     Arthritis Father     Diabetes Father     Hypertension Father     Heart disease Father     Hyperlipidemia Father     Hydrocephalus Father     Urinary tract infection Father     Hodgkin's lymphoma Brother         and non hodgkin's-per allscripts    Stomach cancer Maternal Aunt    Anila Ahmadi Migraines Daughter     Other Daughter         neuroblastoma    Jaundice Daughter     Lung disease Maternal Uncle         mesothelioma       Meds/Allergies       Current Outpatient Medications:     albuterol (PROVENTIL HFA,VENTOLIN HFA) 90 mcg/act inhaler    omeprazole (PriLOSEC) 20 mg delayed release capsule    ondansetron (ZOFRAN-ODT) 4 mg disintegrating tablet    dicyclomine (BENTYL) 20 mg tablet    dicyclomine (BENTYL) 20 mg tablet    mometasone (ELOCON) 0 1 % lotion    ondansetron (ZOFRAN-ODT) 4 mg disintegrating tablet    predniSONE 10 mg tablet    promethazine (PHENERGAN) 25 mg tablet    Suprep Bowel Prep Kit 17 5-3 13-1 6 GM/177ML SOLN    Current Facility-Administered Medications:     lactated ringers infusion, 50 mL/hr, Intravenous, Continuous, 50 mL/hr at 04/16/21 1109    lidocaine (PF) (XYLOCAINE-MPF) 1 % injection 0 5 mL, 0 5 mL, Infiltration, Once PRN    Allergies   Allergen Reactions    Amoxicillin Rash       Objective     /56   Pulse 60   Temp (!) 96 6 °F (35 9 °C)   Resp 18   SpO2 97%       PHYSICAL EXAM    Gen: NAD  CV: RRR  CHEST: Clear  ABD: soft, NT/ND  EXT: no edema      ASSESSMENT/PLAN: Zelda Munguia is a 45y o  year old female who presents for  EGD/ colonoscopy for abdominal pain, nausea, GERD and alternating bowel habits  No previous colonoscopy  The patient is stable and optimized for the procedure, we reviewed risk and benefits  Risk include but not limited to infection, bleeding, perforation and missing a lesion

## 2021-04-29 NOTE — PRE-PROCEDURE INSTRUCTIONS
Pre-Surgery Instructions:   Medication Instructions    albuterol (PROVENTIL HFA,VENTOLIN HFA) 90 mcg/act inhaler Instructed patient per Anesthesia Guidelines   omeprazole (PriLOSEC) 20 mg delayed release capsule Instructed patient per Anesthesia Guidelines   ondansetron (ZOFRAN-ODT) 4 mg disintegrating tablet Instructed patient per Anesthesia Guidelines  Pt verbalizes understanding NPO after MN but can take morning meds w/ a sip of water  Avoid OTC Vitamins/ Herbals/ Supplements until after procedure  Has CHG & verbalizes understanding of bathing instructions

## 2021-05-01 ENCOUNTER — APPOINTMENT (EMERGENCY)
Dept: RADIOLOGY | Facility: HOSPITAL | Age: 41
End: 2021-05-01
Payer: COMMERCIAL

## 2021-05-01 ENCOUNTER — HOSPITAL ENCOUNTER (EMERGENCY)
Facility: HOSPITAL | Age: 41
Discharge: HOME | End: 2021-05-02
Attending: EMERGENCY MEDICINE
Payer: COMMERCIAL

## 2021-05-01 DIAGNOSIS — R79.89 ELEVATED D-DIMER: ICD-10-CM

## 2021-05-01 DIAGNOSIS — M79.89 RIGHT LEG SWELLING: ICD-10-CM

## 2021-05-01 DIAGNOSIS — M79.604 RIGHT LEG PAIN: Primary | ICD-10-CM

## 2021-05-01 LAB
ANION GAP SERPL CALCULATED.3IONS-SCNC: 7 MMOL/L (ref 4–13)
BASOPHILS # BLD AUTO: 0.06 THOUSANDS/ΜL (ref 0–0.1)
BASOPHILS NFR BLD AUTO: 1 % (ref 0–1)
BUN SERPL-MCNC: 13 MG/DL (ref 5–25)
CALCIUM SERPL-MCNC: 8.5 MG/DL (ref 8.3–10.1)
CHLORIDE SERPL-SCNC: 103 MMOL/L (ref 100–108)
CO2 SERPL-SCNC: 28 MMOL/L (ref 21–32)
CREAT SERPL-MCNC: 0.95 MG/DL (ref 0.6–1.3)
D DIMER PPP FEU-MCNC: 0.73 UG/ML FEU
EOSINOPHIL # BLD AUTO: 0.17 THOUSAND/ΜL (ref 0–0.61)
EOSINOPHIL NFR BLD AUTO: 2 % (ref 0–6)
ERYTHROCYTE [DISTWIDTH] IN BLOOD BY AUTOMATED COUNT: 12.3 % (ref 11.6–15.1)
GFR SERPL CREATININE-BSD FRML MDRD: 76 ML/MIN/1.73SQ M
GLUCOSE SERPL-MCNC: 99 MG/DL (ref 65–140)
HCT VFR BLD AUTO: 39.8 % (ref 34.8–46.1)
HGB BLD-MCNC: 12.9 G/DL (ref 11.5–15.4)
IMM GRANULOCYTES # BLD AUTO: 0.02 THOUSAND/UL (ref 0–0.2)
IMM GRANULOCYTES NFR BLD AUTO: 0 % (ref 0–2)
LYMPHOCYTES # BLD AUTO: 2.48 THOUSANDS/ΜL (ref 0.6–4.47)
LYMPHOCYTES NFR BLD AUTO: 36 % (ref 14–44)
MCH RBC QN AUTO: 28.7 PG (ref 26.8–34.3)
MCHC RBC AUTO-ENTMCNC: 32.4 G/DL (ref 31.4–37.4)
MCV RBC AUTO: 88 FL (ref 82–98)
MONOCYTES # BLD AUTO: 0.39 THOUSAND/ΜL (ref 0.17–1.22)
MONOCYTES NFR BLD AUTO: 6 % (ref 4–12)
NEUTROPHILS # BLD AUTO: 3.85 THOUSANDS/ΜL (ref 1.85–7.62)
NEUTS SEG NFR BLD AUTO: 55 % (ref 43–75)
NRBC BLD AUTO-RTO: 0 /100 WBCS
PLATELET # BLD AUTO: 154 THOUSANDS/UL (ref 149–390)
PMV BLD AUTO: 10.2 FL (ref 8.9–12.7)
POTASSIUM SERPL-SCNC: 3.7 MMOL/L (ref 3.5–5.3)
RBC # BLD AUTO: 4.5 MILLION/UL (ref 3.81–5.12)
SODIUM SERPL-SCNC: 138 MMOL/L (ref 136–145)
WBC # BLD AUTO: 6.97 THOUSAND/UL (ref 4.31–10.16)

## 2021-05-01 PROCEDURE — 99284 EMERGENCY DEPT VISIT MOD MDM: CPT

## 2021-05-01 PROCEDURE — 36415 COLL VENOUS BLD VENIPUNCTURE: CPT | Performed by: EMERGENCY MEDICINE

## 2021-05-01 PROCEDURE — 80048 BASIC METABOLIC PNL TOTAL CA: CPT | Performed by: EMERGENCY MEDICINE

## 2021-05-01 PROCEDURE — 85379 FIBRIN DEGRADATION QUANT: CPT | Performed by: EMERGENCY MEDICINE

## 2021-05-01 PROCEDURE — 85025 COMPLETE CBC W/AUTO DIFF WBC: CPT | Performed by: EMERGENCY MEDICINE

## 2021-05-01 RX ORDER — ACETAMINOPHEN 325 MG/1
650 TABLET ORAL ONCE
Status: COMPLETED | OUTPATIENT
Start: 2021-05-02 | End: 2021-05-02

## 2021-05-02 ENCOUNTER — HOSPITAL ENCOUNTER (EMERGENCY)
Dept: NON INVASIVE DIAGNOSTICS | Facility: HOSPITAL | Age: 41
Discharge: HOME | End: 2021-05-02
Payer: COMMERCIAL

## 2021-05-02 ENCOUNTER — APPOINTMENT (EMERGENCY)
Dept: RADIOLOGY | Facility: HOSPITAL | Age: 41
End: 2021-05-02
Payer: COMMERCIAL

## 2021-05-02 VITALS
HEART RATE: 64 BPM | RESPIRATION RATE: 18 BRPM | HEIGHT: 72 IN | BODY MASS INDEX: 39.68 KG/M2 | OXYGEN SATURATION: 99 % | TEMPERATURE: 96.8 F | WEIGHT: 293 LBS | DIASTOLIC BLOOD PRESSURE: 71 MMHG | SYSTOLIC BLOOD PRESSURE: 116 MMHG

## 2021-05-02 DIAGNOSIS — R79.89 ELEVATED D-DIMER: ICD-10-CM

## 2021-05-02 DIAGNOSIS — M79.89 RIGHT LEG SWELLING: ICD-10-CM

## 2021-05-02 DIAGNOSIS — M79.604 RIGHT LEG PAIN: ICD-10-CM

## 2021-05-02 LAB
EXT PREG TEST URINE: NEGATIVE
EXT. CONTROL ED NAV: NORMAL

## 2021-05-02 PROCEDURE — 93971 EXTREMITY STUDY: CPT

## 2021-05-02 PROCEDURE — 73590 X-RAY EXAM OF LOWER LEG: CPT

## 2021-05-02 PROCEDURE — 73564 X-RAY EXAM KNEE 4 OR MORE: CPT

## 2021-05-02 PROCEDURE — 99284 EMERGENCY DEPT VISIT MOD MDM: CPT | Performed by: EMERGENCY MEDICINE

## 2021-05-02 PROCEDURE — 81025 URINE PREGNANCY TEST: CPT | Performed by: EMERGENCY MEDICINE

## 2021-05-02 RX ADMIN — ACETAMINOPHEN 650 MG: 325 TABLET, FILM COATED ORAL at 00:01

## 2021-05-02 RX ADMIN — APIXABAN 10 MG: 5 TABLET, FILM COATED ORAL at 01:09

## 2021-05-02 NOTE — DISCHARGE INSTRUCTIONS
Please follow-up by getting an ultrasound of your right leg  I have ordered this study stat  They should call you today  If you have not heard anything by 09:00, please call the emergency department at [phone], and someone will help you out  Anything to his or worsens, please return to the emergency department

## 2021-05-02 NOTE — ED NOTES
Patient took Tylenol around 0830 and Ibuprofen around [facility] [address], [address] [facility]  05/01/21 2804

## 2021-05-02 NOTE — ED PROVIDER NOTES
History  Chief Complaint   Patient presents with    Leg Pain     pt states R leg pain behind knee for the past week with increasing pain, denies any injury     HPI  67-year-old woman comes of the right leg pain that is progressive worsened throughout the week  The pain is behind the right knee, radiates down her right leg  Denies any injury  What changed tonight was that she was sleeping when it woke her from sleep secondary to pain  She denies any fevers or chills, denies any history of blood clots, denies any history of clotting disorder  Prior to Admission Medications   Prescriptions Last Dose Informant Patient Reported? Taking?    albuterol (PROVENTIL HFA,VENTOLIN HFA) 90 mcg/act inhaler Unknown at Unknown time Self Yes No   Sig: Inhale 2 puffs every 6 (six) hours as needed for wheezing   omeprazole (PriLOSEC) 20 mg delayed release capsule Unknown at Unknown time Self Yes No   Sig: Take 20 mg by mouth daily   ondansetron (ZOFRAN-ODT) 4 mg disintegrating tablet Unknown at Unknown time Self No No   Sig: Take 1 tablet (4 mg total) by mouth every 8 (eight) hours as needed for nausea or vomiting for up to 20 doses      Facility-Administered Medications: None       Past Medical History:   Diagnosis Date    Asthma     last assessed 10/15/15    GERD (gastroesophageal reflux disease)     last assessed 10/15/15    Gestational diabetes 2019    Wears glasses        Past Surgical History:   Procedure Laterality Date     SECTION      last assessed 14    CHOLECYSTECTOMY      last assessed 14    COLONOSCOPY      DILATION AND CURETTAGE OF UTERUS      DILATION AND CURETTAGE OF UTERUS      EGD      IR BIOPSY OTHER  3/12/2021    OTHER SURGICAL HISTORY Right     right fallopian tube removed    MN LAP,FULGURATE/EXCISE LESIONS Right 2017    Procedure: LAPAROSCOPIC OVARIAN CYSTECTOMY VERSUS OOPHERECTOMY;  Surgeon: Karen Patrick MD;  [facility];  Service: Gynecology    MN REVISE MEDIAN N/CARPAL TUNNEL SURG Right 3/14/2018    Procedure: RELEASE CARPAL TUNNEL;  Surgeon: Elsa Davis MD;  [facility];  Service: Orthopedics    SALPINGECTOMY      right side    TONSILLECTOMY      last assessed 14       Family History   Problem Relation Age of Onset    Arthritis Mother     Atrial fibrillation Mother     COPD Mother     Hypertension Mother     Other Mother         urinary incontinence    Depression Father     Anxiety disorder Father     Arthritis Father     Diabetes Father     Hypertension Father     Heart disease Father     Hyperlipidemia Father     Hydrocephalus Father     Urinary tract infection Father     Hodgkin's lymphoma Brother         and non hodgkin's-per allscripts    Stomach cancer Maternal Aunt    Anjana Awad Migraines Daughter     Other Daughter         neuroblastoma    Jaundice Daughter     Lung disease Maternal Uncle         mesothelioma     I have reviewed and agree with the history as documented  E-Cigarette/Vaping    E-Cigarette Use Never User      E-Cigarette/Vaping Substances     Social History     Tobacco Use    Smoking status: Former Smoker     Packs/day: 0 50     Years: 20 00     Pack years: 10 00     Types: Cigarettes     Quit date:      Years since quittin 3    Smokeless tobacco: Never Used    Tobacco comment:    Substance Use Topics    Alcohol use: Yes     Frequency: Monthly or less     Comment: rarely; social    Drug use: No       Review of Systems   Constitutional: Negative  Negative for chills and fever  HENT: Negative  Negative for congestion and sore throat  Eyes: Negative  Negative for discharge and redness  Respiratory: Negative  Negative for chest tightness and shortness of breath  Cardiovascular: Negative  Negative for chest pain and palpitations  Gastrointestinal: Negative  Negative for abdominal pain, nausea and vomiting  Endocrine: Negative  Negative for cold intolerance and polyphagia  Genitourinary: Negative  Negative for difficulty urinating and dysuria  Musculoskeletal: Negative for arthralgias and back pain  Leg pain   Skin: Negative  Negative for color change and wound  Allergic/Immunologic: Negative  Negative for environmental allergies  Neurological: Negative  Negative for dizziness, weakness and headaches  Hematological: Negative  Psychiatric/Behavioral: Negative  Negative for behavioral problems  The patient is not nervous/anxious  All other systems reviewed and are negative  Physical Exam  Physical Exam  Vitals signs and nursing note reviewed  Constitutional:       General: She is not in acute distress  Appearance: She is well-developed  She is not diaphoretic  HENT:      Head: Normocephalic and atraumatic  Right Ear: External ear normal       Left Ear: External ear normal       Nose: No congestion or rhinorrhea  Eyes:      General: No scleral icterus  Right eye: No discharge  Left eye: No discharge  Conjunctiva/sclera: Conjunctivae normal       Pupils: Pupils are equal, round, and reactive to light  Neck:      Musculoskeletal: Normal range of motion and neck supple  No neck rigidity or muscular tenderness  Thyroid: No thyromegaly  Trachea: No tracheal deviation  Cardiovascular:      Rate and Rhythm: Regular rhythm  Heart sounds: No murmur  No friction rub  No gallop  Pulmonary:      Effort: Pulmonary effort is normal  No respiratory distress  Breath sounds: Normal breath sounds  No stridor  No wheezing or rales  Abdominal:      General: Bowel sounds are normal  There is no distension  Palpations: Abdomen is soft  Tenderness: There is no abdominal tenderness  There is no guarding or rebound  Musculoskeletal: Normal range of motion  General: No tenderness, deformity or signs of injury        Comments: Right lower extremity shows asymmetric swelling approximately 2 cm greater than left lower extremity, she does have tenderness behind the right knee, she has full range of motion of the right leg, she has mild tenderness in her calf   Skin:     General: Skin is warm and dry  Findings: No erythema or rash  Neurological:      General: No focal deficit present  Mental Status: She is alert and oriented to person, place, and time  Cranial Nerves: No cranial nerve deficit  Psychiatric:         Behavior: Behavior normal          Thought Content:  Thought content normal          Vital Signs  ED Triage Vitals [05/01/21 2301]   Temperature Pulse Respirations Blood Pressure SpO2   (!) 96 8 °F (36 °C) 55 18 129/79 98 %      Temp Source Heart Rate Source Patient Position - Orthostatic VS BP Location FiO2 (%)   Temporal Monitor Lying Left arm --      Pain Score       6           Vitals:    05/01/21 2301 05/01/21 2330 05/02/21 0000   BP: 129/79 114/61 116/71   Pulse: 55 55 64   Patient Position - Orthostatic VS: Lying Sitting Sitting         Visual Acuity      ED Medications  Medications   acetaminophen (TYLENOL) tablet 650 mg (650 mg Oral Given 5/2/21 0001)   apixaban (ELIQUIS) tablet 10 mg (10 mg Oral Given 5/2/21 0109)       Diagnostic Studies  Results Reviewed     Procedure Component Value Units Date/Time    POCT pregnancy, urine [[id number]]  (Normal) Resulted: 05/02/21 0045    Lab Status: Final result Updated: 05/02/21 0045     EXT PREG TEST UR (Ref: Negative) negative     Control valid    D-dimer, quantitative [[id number]]  (Abnormal) Collected: 05/01/21 2328    Lab Status: Final result Specimen: Blood from Arm, Right Updated: 05/01/21 2352     D-Dimer, Quant 0 73 ug/ml FEU     Basic metabolic panel [[id number]] Collected: 05/01/21 2328    Lab Status: Final result Specimen: Blood from Arm, Right Updated: 05/01/21 2343     Sodium 138 mmol/L      Potassium 3 7 mmol/L      Chloride 103 mmol/L      CO2 28 mmol/L      ANION GAP 7 mmol/L      BUN 13 mg/dL      Creatinine 0 95 mg/dL Glucose 99 mg/dL      Calcium 8 5 mg/dL      eGFR 76 ml/min/1 73sq m     Narrative:      [place] guidelines for Chronic Kidney Disease (CKD):     Stage 1 with normal or high GFR (GFR > 90 mL/min/1 73 square meters)    Stage 2 Mild CKD (GFR = 60-89 mL/min/1 73 square meters)    Stage 3A Moderate CKD (GFR = 45-59 mL/min/1 73 square meters)    Stage 3B Moderate CKD (GFR = 30-44 mL/min/1 73 square meters)    Stage 4 Severe CKD (GFR = 15-29 mL/min/1 73 square meters)    Stage 5 End Stage CKD (GFR <15 mL/min/1 73 square meters)  Note: GFR calculation is accurate only with a steady state creatinine    CBC and differential [[id number]] Collected: 05/01/21 2328    Lab Status: Final result Specimen: Blood from Arm, Right Updated: 05/01/21 2334     WBC 6 97 Thousand/uL      RBC 4 50 Million/uL      Hemoglobin 12 9 g/dL      Hematocrit 39 8 %      MCV 88 fL      MCH 28 7 pg      MCHC 32 4 g/dL      RDW 12 3 %      MPV 10 2 fL      Platelets 307 Thousands/uL      nRBC 0 /100 WBCs      Neutrophils Relative 55 %      Immat GRANS % 0 %      Lymphocytes Relative 36 %      Monocytes Relative 6 %      Eosinophils Relative 2 %      Basophils Relative 1 %      Neutrophils Absolute 3 85 Thousands/µL      Immature Grans Absolute 0 02 Thousand/uL      Lymphocytes Absolute 2 48 Thousands/µL      Monocytes Absolute 0 39 Thousand/µL      Eosinophils Absolute 0 17 Thousand/µL      Basophils Absolute 0 06 Thousands/µL                  XR knee 4+ views Right injury   ED Interpretation by Tharon Sandhoff, MD (05/02 0115)   No fracture      XR tibia fibula 2 views RIGHT   ED Interpretation by Tharon Sandhoff, MD (05/02 0115)   No fracture      VAS lower limb venous duplex study, unilateral/limited    (Results Pending)              Procedures  Procedures         ED Course  ED Course as of May 02 0122   Sun May 02, 2021   0027 D-dimer is positive, will treat with anticoagulation        0035 Red Blood Cell Count: 4 50 did instruct the patient to follow-up with duplex ultrasound  I did send a text to the [employer] text for the on-call vascular tach  Hopefully they will see the morning  I will also discuss the case with my overnight colleague for the morning  I have also given the patient a phone number for the ER she is not her anything by 09:00 to call here  Patient will be discharged  SBIRT 22yo+      Most Recent Value   SBIRT (24 yo +)   In order to provide better care to our patients, we are screening all of our patients for alcohol and drug use  Would it be okay to ask you these screening questions? Yes Filed at: 05/01/2021 2305   Initial Alcohol Screen: US AUDIT-C    1  How often do you have a drink containing alcohol?  0 Filed at: 05/01/2021 2305   2  How many drinks containing alcohol do you have on a typical day you are drinking? 0 Filed at: 05/01/2021 2305   3b  FEMALE Any Age, or MALE 65+: How often do you have 4 or more drinks on one occassion? 0 Filed at: 05/01/2021 2305   Audit-C Score  0 Filed at: 05/01/2021 2305   ERON: How many times in the past year have you    Used an illegal drug or used a prescription medication for non-medical reasons?   Never Filed at: 05/01/2021 2305            Wells' Criteria for DVT      Most Recent Value   Wells' Criteria for DVT   Active cancer Treatment or palliation within 6 months  0 Filed at: 05/01/2021 2310   Bedridden recently >3 days or major surgery within 12 weeks  0 Filed at: 05/01/2021 2310   Calf swelling >3 cm compared to the other leg  0 Filed at: 05/01/2021 2310   Entire leg swollen  0 Filed at: 05/01/2021 2310   Collateral (nonvaricose) superficial veins present  0 Filed at: 05/01/2021 2310   Localized tenderness along the deep venous system  1 Filed at: 05/01/2021 2310   Pitting edema, confined to symptomatic leg  0 Filed at: 05/01/2021 2310   Paralysis, paresis, or recent plaster immobilization of the lower extremity  0 Filed at: 05/01/2021 2310   Previously documented DVT  0 Filed at: 05/01/2021 2310   Alternative diagnosis to DVT as likely or more likely  0 Filed at: 05/01/2021 2310   Wells DVT Critera Score  1 Filed at: 05/01/2021 2310              [facility]  Number of Diagnoses or Management Options  Diagnosis management comments: 67-year-old woman presents with right leg pain  She gets 1 point on Wells criteria for DVT, will get a D-dimer  If dimer is positive, will give anticoagulation have her follow up with a duplex study in the morning  Will get CBC and BMP, will get urinalysis  Disposition  Final diagnoses:   Right leg pain   Right leg swelling   Elevated d-dimer     Time reflects when diagnosis was documented in both MDM as applicable and the Disposition within this note     Time User Action Codes Description Comment    5/2/2021 12:55 AM Cesar Glaze Add [U67 788] Right leg pain     5/2/2021 12:55 AM Cesar Glaze Add [M79 89] Right leg swelling     5/2/2021 12:55 AM Cesar Glaze Add [R79 89] Elevated d-dimer       ED Disposition     ED Disposition Condition Date/Time Comment    Discharge Stable Sun May 2, 2021 12:55 AM Carolina Bruno discharge to home/self care              Follow-up Information     Follow up With Specialties Details Why Contact Info Additional Information    Millicent Olmstead DO Family Medicine Call in 1 day follow up being seen in the emergency department [address] [facility]        [facility] Emergency Department Emergency Medicine Go to  As needed, If symptoms worsen [place]  [id number]  [address] [place] [address] Emergency Department, [address], [address]          Discharge Medication List as of 5/2/2021 12:59 AM      CONTINUE these medications which have NOT CHANGED    Details   albuterol (PROVENTIL HFA,VENTOLIN HFA) 90 mcg/act inhaler Inhale 2 puffs every 6 (six) hours as needed for wheezing, Historical Med      omeprazole (PriLOSEC) 20 mg delayed release capsule Take 20 mg by mouth daily, Starting Thu 3/11/2021, Historical Med      ondansetron (ZOFRAN-ODT) 4 mg disintegrating tablet Take 1 tablet (4 mg total) by mouth every 8 (eight) hours as needed for nausea or vomiting for up to 20 doses, Starting Tue 2/23/2021, Normal           Outpatient Discharge Orders   VAS lower limb venous duplex study, unilateral/limited   Standing Status: Future Standing Exp   Date: 05/02/25       PDMP Review     None          ED Provider  Electronically Signed by           Jorje Cabral MD  05/02/21 4023       Jorje Cabral MD  05/02/21 7525

## 2021-05-02 NOTE — ED NOTES
XRAY at bedside      Sacha Stone, [facility]  05/01/21 1331

## 2021-05-03 PROCEDURE — 93971 EXTREMITY STUDY: CPT | Performed by: SURGERY

## 2021-05-04 ENCOUNTER — ANESTHESIA EVENT (OUTPATIENT)
Dept: PERIOP | Facility: HOSPITAL | Age: 41
End: 2021-05-04
Payer: COMMERCIAL

## 2021-05-04 DIAGNOSIS — M79.89 SOFT TISSUE MASS: Primary | ICD-10-CM

## 2021-05-05 ENCOUNTER — HOSPITAL ENCOUNTER (OUTPATIENT)
Facility: HOSPITAL | Age: 41
Setting detail: OUTPATIENT SURGERY
Discharge: HOME | End: 2021-05-05
Attending: SURGERY | Admitting: SURGERY
Payer: COMMERCIAL

## 2021-05-05 ENCOUNTER — HOSPITAL ENCOUNTER (OUTPATIENT)
Dept: ULTRASOUND IMAGING | Facility: HOSPITAL | Age: 41
Discharge: HOME | End: 2021-05-05
Payer: COMMERCIAL

## 2021-05-05 ENCOUNTER — ANESTHESIA (OUTPATIENT)
Dept: PERIOP | Facility: HOSPITAL | Age: 41
End: 2021-05-05
Payer: COMMERCIAL

## 2021-05-05 VITALS
DIASTOLIC BLOOD PRESSURE: 64 MMHG | WEIGHT: 293 LBS | BODY MASS INDEX: 39.68 KG/M2 | OXYGEN SATURATION: 98 % | TEMPERATURE: 98.6 F | HEIGHT: 72 IN | RESPIRATION RATE: 18 BRPM | HEART RATE: 57 BPM | SYSTOLIC BLOOD PRESSURE: 112 MMHG

## 2021-05-05 DIAGNOSIS — M79.89 SOFT TISSUE MASS: ICD-10-CM

## 2021-05-05 DIAGNOSIS — M79.89 SOFT TISSUE MASS: Primary | ICD-10-CM

## 2021-05-05 LAB
EXT PREGNANCY TEST URINE: NEGATIVE
EXT. CONTROL: NORMAL

## 2021-05-05 PROCEDURE — 88307 TISSUE EXAM BY PATHOLOGIST: CPT | Performed by: PATHOLOGY

## 2021-05-05 PROCEDURE — NC001 PR NO CHARGE: Performed by: SURGERY

## 2021-05-05 PROCEDURE — 22903 EXC ABD LES SC 3 CM/>: CPT | Performed by: SURGERY

## 2021-05-05 PROCEDURE — 76705 ECHO EXAM OF ABDOMEN: CPT

## 2021-05-05 PROCEDURE — 81025 URINE PREGNANCY TEST: CPT | Performed by: SURGERY

## 2021-05-05 RX ORDER — SODIUM CHLORIDE, SODIUM LACTATE, POTASSIUM CHLORIDE, CALCIUM CHLORIDE 600; 310; 30; 20 MG/100ML; MG/100ML; MG/100ML; MG/100ML
50 INJECTION, SOLUTION INTRAVENOUS CONTINUOUS
Status: DISCONTINUED | OUTPATIENT
Start: 2021-05-05 | End: 2021-05-05 | Stop reason: HOSPADM

## 2021-05-05 RX ORDER — CLINDAMYCIN PHOSPHATE 900 MG/50ML
INJECTION INTRAVENOUS AS NEEDED
Status: DISCONTINUED | OUTPATIENT
Start: 2021-05-05 | End: 2021-05-05

## 2021-05-05 RX ORDER — BUPIVACAINE HYDROCHLORIDE AND EPINEPHRINE 5; 5 MG/ML; UG/ML
INJECTION, SOLUTION PERINEURAL AS NEEDED
Status: DISCONTINUED | OUTPATIENT
Start: 2021-05-05 | End: 2021-05-05 | Stop reason: HOSPADM

## 2021-05-05 RX ORDER — LIDOCAINE HYDROCHLORIDE 10 MG/ML
0.5 INJECTION, SOLUTION EPIDURAL; INFILTRATION; INTRACAUDAL; PERINEURAL ONCE AS NEEDED
Status: DISCONTINUED | OUTPATIENT
Start: 2021-05-05 | End: 2021-05-05 | Stop reason: HOSPADM

## 2021-05-05 RX ORDER — ONDANSETRON 2 MG/ML
4 INJECTION INTRAMUSCULAR; INTRAVENOUS ONCE AS NEEDED
Status: DISCONTINUED | OUTPATIENT
Start: 2021-05-05 | End: 2021-05-05 | Stop reason: HOSPADM

## 2021-05-05 RX ORDER — ONDANSETRON 2 MG/ML
INJECTION INTRAMUSCULAR; INTRAVENOUS AS NEEDED
Status: DISCONTINUED | OUTPATIENT
Start: 2021-05-05 | End: 2021-05-05

## 2021-05-05 RX ORDER — SODIUM CHLORIDE, SODIUM LACTATE, POTASSIUM CHLORIDE, CALCIUM CHLORIDE 600; 310; 30; 20 MG/100ML; MG/100ML; MG/100ML; MG/100ML
125 INJECTION, SOLUTION INTRAVENOUS CONTINUOUS
Status: DISCONTINUED | OUTPATIENT
Start: 2021-05-05 | End: 2021-05-05 | Stop reason: HOSPADM

## 2021-05-05 RX ORDER — EPHEDRINE SULFATE 50 MG/ML
INJECTION INTRAVENOUS AS NEEDED
Status: DISCONTINUED | OUTPATIENT
Start: 2021-05-05 | End: 2021-05-05

## 2021-05-05 RX ORDER — DEXAMETHASONE SODIUM PHOSPHATE 10 MG/ML
INJECTION, SOLUTION INTRAMUSCULAR; INTRAVENOUS AS NEEDED
Status: DISCONTINUED | OUTPATIENT
Start: 2021-05-05 | End: 2021-05-05

## 2021-05-05 RX ORDER — MORPHINE SULFATE 4 MG/ML
2 INJECTION, SOLUTION INTRAMUSCULAR; INTRAVENOUS EVERY 4 HOURS PRN
Status: DISCONTINUED | OUTPATIENT
Start: 2021-05-05 | End: 2021-05-05 | Stop reason: HOSPADM

## 2021-05-05 RX ORDER — OXYCODONE HYDROCHLORIDE AND ACETAMINOPHEN 5; 325 MG/1; MG/1
2 TABLET ORAL EVERY 4 HOURS PRN
Status: DISCONTINUED | OUTPATIENT
Start: 2021-05-05 | End: 2021-05-05 | Stop reason: HOSPADM

## 2021-05-05 RX ORDER — CHLORHEXIDINE GLUCONATE 4 G/100ML
SOLUTION TOPICAL DAILY PRN
Status: DISCONTINUED | OUTPATIENT
Start: 2021-05-05 | End: 2021-05-05 | Stop reason: HOSPADM

## 2021-05-05 RX ORDER — PROPOFOL 10 MG/ML
INJECTION, EMULSION INTRAVENOUS AS NEEDED
Status: DISCONTINUED | OUTPATIENT
Start: 2021-05-05 | End: 2021-05-05

## 2021-05-05 RX ORDER — MIDAZOLAM HYDROCHLORIDE 2 MG/2ML
INJECTION, SOLUTION INTRAMUSCULAR; INTRAVENOUS AS NEEDED
Status: DISCONTINUED | OUTPATIENT
Start: 2021-05-05 | End: 2021-05-05

## 2021-05-05 RX ORDER — FENTANYL CITRATE/PF 50 MCG/ML
25 SYRINGE (ML) INJECTION
Status: DISCONTINUED | OUTPATIENT
Start: 2021-05-05 | End: 2021-05-05 | Stop reason: HOSPADM

## 2021-05-05 RX ORDER — ONDANSETRON 2 MG/ML
4 INJECTION INTRAMUSCULAR; INTRAVENOUS EVERY 8 HOURS PRN
Status: DISCONTINUED | OUTPATIENT
Start: 2021-05-05 | End: 2021-05-05 | Stop reason: HOSPADM

## 2021-05-05 RX ORDER — OXYCODONE HYDROCHLORIDE AND ACETAMINOPHEN 5; 325 MG/1; MG/1
1 TABLET ORAL EVERY 4 HOURS PRN
Qty: 10 TABLET | Refills: 0 | Status: SHIPPED | OUTPATIENT
Start: 2021-05-05 | End: 2021-06-17

## 2021-05-05 RX ORDER — LIDOCAINE HYDROCHLORIDE AND EPINEPHRINE 10; 10 MG/ML; UG/ML
INJECTION, SOLUTION INFILTRATION; PERINEURAL AS NEEDED
Status: DISCONTINUED | OUTPATIENT
Start: 2021-05-05 | End: 2021-05-05 | Stop reason: HOSPADM

## 2021-05-05 RX ORDER — LIDOCAINE HYDROCHLORIDE 10 MG/ML
INJECTION, SOLUTION EPIDURAL; INFILTRATION; INTRACAUDAL; PERINEURAL AS NEEDED
Status: DISCONTINUED | OUTPATIENT
Start: 2021-05-05 | End: 2021-05-05

## 2021-05-05 RX ORDER — FENTANYL CITRATE 50 UG/ML
INJECTION, SOLUTION INTRAMUSCULAR; INTRAVENOUS AS NEEDED
Status: DISCONTINUED | OUTPATIENT
Start: 2021-05-05 | End: 2021-05-05

## 2021-05-05 RX ORDER — CLINDAMYCIN PHOSPHATE 900 MG/50ML
900 INJECTION INTRAVENOUS ONCE
Status: DISCONTINUED | OUTPATIENT
Start: 2021-05-05 | End: 2021-05-05 | Stop reason: HOSPADM

## 2021-05-05 RX ORDER — KETOROLAC TROMETHAMINE 30 MG/ML
INJECTION, SOLUTION INTRAMUSCULAR; INTRAVENOUS AS NEEDED
Status: DISCONTINUED | OUTPATIENT
Start: 2021-05-05 | End: 2021-05-05

## 2021-05-05 RX ADMIN — MIDAZOLAM HYDROCHLORIDE 2 MG: 1 INJECTION, SOLUTION INTRAMUSCULAR; INTRAVENOUS at 13:26

## 2021-05-05 RX ADMIN — FENTANYL CITRATE 50 MCG: 50 INJECTION, SOLUTION INTRAMUSCULAR; INTRAVENOUS at 14:19

## 2021-05-05 RX ADMIN — OXYCODONE HYDROCHLORIDE AND ACETAMINOPHEN 2 TABLET: 5; 325 TABLET ORAL at 15:17

## 2021-05-05 RX ADMIN — SODIUM CHLORIDE, SODIUM LACTATE, POTASSIUM CHLORIDE, AND CALCIUM CHLORIDE 125 ML/HR: .6; .31; .03; .02 INJECTION, SOLUTION INTRAVENOUS at 12:17

## 2021-05-05 RX ADMIN — CLINDAMYCIN PHOSPHATE 900 MG: 18 INJECTION, SOLUTION INTRAMUSCULAR; INTRAVENOUS at 13:20

## 2021-05-05 RX ADMIN — ONDANSETRON HYDROCHLORIDE 4 MG: 2 INJECTION, SOLUTION INTRAVENOUS at 13:42

## 2021-05-05 RX ADMIN — PROPOFOL 200 MG: 10 INJECTION, EMULSION INTRAVENOUS at 13:29

## 2021-05-05 RX ADMIN — DEXAMETHASONE SODIUM PHOSPHATE 4 MG: 10 INJECTION, SOLUTION INTRAMUSCULAR; INTRAVENOUS at 13:42

## 2021-05-05 RX ADMIN — FENTANYL CITRATE 50 MCG: 50 INJECTION, SOLUTION INTRAMUSCULAR; INTRAVENOUS at 14:27

## 2021-05-05 RX ADMIN — EPHEDRINE SULFATE 10 MG: 50 INJECTION, SOLUTION INTRAVENOUS at 13:55

## 2021-05-05 RX ADMIN — EPHEDRINE SULFATE 10 MG: 50 INJECTION, SOLUTION INTRAVENOUS at 14:17

## 2021-05-05 RX ADMIN — SODIUM CHLORIDE, SODIUM LACTATE, POTASSIUM CHLORIDE, AND CALCIUM CHLORIDE: .6; .31; .03; .02 INJECTION, SOLUTION INTRAVENOUS at 13:02

## 2021-05-05 RX ADMIN — LIDOCAINE HYDROCHLORIDE 50 MG: 10 INJECTION, SOLUTION EPIDURAL; INFILTRATION; INTRACAUDAL; PERINEURAL at 13:28

## 2021-05-05 RX ADMIN — KETOROLAC TROMETHAMINE 30 MG: 30 INJECTION, SOLUTION INTRAMUSCULAR; INTRAVENOUS at 14:23

## 2021-05-05 RX ADMIN — FENTANYL CITRATE 100 MCG: 50 INJECTION, SOLUTION INTRAMUSCULAR; INTRAVENOUS at 13:27

## 2021-05-05 NOTE — OP NOTE
OPERATIVE REPORT  PATIENT NAME: Carolina Bruno    :  1980  MRN: [id number]  Pt Location: [facility] OR ROOM 02    SURGERY DATE: 2021    Surgeon(s) and Role: * Miranda Chi DO - Primary     * Michelle Mendoza MD - Assisting    Preop Diagnosis:  Soft tissue mass [M79 89]    Post-Op Diagnosis Codes: * Soft tissue mass [M79 89]    Procedure(s) (LRB):  Excision Lower Quadrant Endometrioma (Left)    Specimen(s):  ID Type Source Tests Collected by Time Destination   1 : endometroma Tissue Mass TISSUE EXAM Miranda Chi DO 2021 1412        Estimated Blood Loss:   Minimal    Drains:  * No LDAs found *    Anesthesia Type:   General/LMA    Operative Indications:  Soft tissue mass [M79 89]      Operative Findings:  Soft tissue mass approximately 3 cm in diameter  Previous biopsy showed endometrioma    Complications:   None    Procedure and Technique:  Patient brought to operating arena and placed in supine position operating table  All the regular monitor devices were then connected  The patient underwent general anesthesia with LMA intubation without complication  She received perioperative antibiotics  She received bilateral lower sequential compressive devices in addition to subcutaneous heparin for DVT prophylaxis  Patient then prepped draped usual sterile fashion  Time was 1 verify correct patient, procedure, position, site  Approximately 4 cm transverse incision was made overlying the marked area  Patient did have a preoperative ultrasound to help determine exact location of soft tissue mass  The incision was made through the subcutaneous tissue using 15 blade scalpel  Further dissection continued deeper using Bovie electrocautery  Hemostasis was achieved  Approximately 3 cm deep came upon a hard palpable mass  This was attached to the underlying fascia and rectus muscle  The mass was then circumferentially dissected free and then removed off the field    The mass measured approximately 3 cm in diameter  There is no obvious breach of the fascia however the portions that were slightly  were then imbricated to reinforce it  The cavity was then irrigated with normal saline  Hemostasis was achieved with Bovie electrocautery and direct pressure  Alden's fascia was then closed with interrupted 3-0 Vicryl  The skin was then approximated with a running 4-0 Monocryl  Steri-Strips and dry sterile dressing then applied  The patient tolerated procedure well taken to the postanesthesia care unit stable condition  All lap, needle, instrument counts were correct     I was present for the entire procedure, A qualified resident physician was not available and A physician assistant was required during the procedure for retraction tissue handling,dissection and suturing    Patient Disposition:  PACU     SIGNATURE: Brian Contreras DO  DATE: May 5, 2021  TIME: 2:36 PM

## 2021-05-05 NOTE — ANESTHESIA PREPROCEDURE EVALUATION
Procedure:  Excision Lower Quadrant Endometrioma (Left Chest)    Relevant Problems   ANESTHESIA (within normal limits)      CARDIO (within normal limits)      GI/HEPATIC   (+) Gastroesophageal reflux disease (well controlled)      GYN   (-) Currently pregnant      PULMONARY   (+) Mild intermittent asthma without complication (well controlled, last inhaler a couple weeks ago)   (-) Sleep apnea (denies)   (-) Smoking   (-) URI (upper respiratory infection)      Other   (+) Soft tissue mass (endometrioma)    BMI 40    Physical Exam    Airway    Mallampati score: II  TM Distance: >3 FB  Neck ROM: full     Dental   No notable dental hx     Cardiovascular      Pulmonary      Other Findings       Lab Results   Component Value Date    WBC 6 97 05/01/2021    HGB 12 9 05/01/2021     05/01/2021     Lab Results   Component Value Date    SODIUM 138 05/01/2021    K 3 7 05/01/2021    BUN 13 05/01/2021    CREATININE 0 95 05/01/2021    EGFR 76 05/01/2021    GLUCOSE 92 09/29/2014     Anesthesia Plan  ASA Score- 2     Anesthesia Type- general with ASA Monitors  Additional Monitors:   Airway Plan: LMA  Plan Factors-Exercise tolerance (METS): >4 METS  Chart reviewed  Existing labs reviewed  Patient summary reviewed  Patient is not a current smoker  Induction- intravenous  Postoperative Plan-     Informed Consent- Anesthetic plan and risks discussed with patient and spouse  I personally reviewed this patient with the CRNA  Discussed and agreed on the Anesthesia Plan with the CRNA  Karrie Nissen

## 2021-05-05 NOTE — DISCHARGE INSTRUCTIONS
Follow-up with Dr Janene Jackson in 2 weeks per point  The leg in diet as tolerated  Low intensity activity as tolerated, no heavy lifting, nothing greater than 20 lb for 2 weeks  Dressing may be removed on Friday  You may shower on Friday  No baths or swimming  If developed fever, worsening pain, redness or drainage from the site then call the office or go to the ER

## 2021-05-05 NOTE — H&P
Patient seen and examined  Prior history reviewed  Patient examined and no change from prior  Will proceed to OR as planned  Blood pressure 110/69, pulse (!) 53, temperature 98 2 °F (36 8 °C), temperature source Tympanic, resp  rate 20, height 6' (1 829 m), weight 135 kg (297 lb), last menstrual period 04/18/2021, SpO2 97 %, currently breastfeeding  Assessment/Plan:     Soft tissue mass  Biopsy of soft tissue mass showing likely endometrioma  Patient still with significant tenderness in the left lower quadrant overlying mass  Patient wished to have this removed I think this is reasonable  The patient will be scheduled for excision of endometrioma under sedation in the operating room  The procedure self including all associated risks and benefits were discussed the patient great length  The patient verbalized understands risks and is willing to proceed, consent was signed  She is young and otherwise healthy will not require any additional preoperative  Workup          Diagnoses and all orders for this visit:     Soft tissue mass     Other orders  -     omeprazole (PriLOSEC) 20 mg delayed release capsule; Take 20 mg by mouth daily            Subjective:       Patient ID: Nancy Karimi is a 45 y o  female        67-year-old female who presents for follow-up after undergoing biopsy of soft tissue mass of left lower quadrant  Biopsy showing endometriosis  Patient still with significant tenderness overlying the left lower quadrant  She states at this point she would like to have this removed  She did recently see her gyn physician who states that on review of previous operative interventions there is no evidence or nodes about endometriosis within the intra-abdominal cavity  No intervention from gyn at this point  Patient denies any other associated symptoms  No nausea vomiting no fevers or chills    Just with the left lower quadrant pain         The following portions of the patient's history were reviewed and updated as appropriate:   She  has a past medical history of Asthma, Diabetes mellitus ([address] ), GERD (gastroesophageal reflux disease), and Gestational diabetes  She        Patient Active Problem List     Diagnosis Date Noted    Soft tissue mass 2021    Generalized abdominal pain 2021    Alternating constipation and diarrhea 2021    Nausea 2021    Prediabetes 10/07/2019    Encounter for gynecological examination (general) (routine) without abnormal findings 2018    Encounter for IUD removal 2018    Diverticulosis of intestine without bleeding 10/26/2018    Pelvic pain 2018    Carpal tunnel syndrome on left 2018    Mild intermittent asthma without complication     Hypothyroidism 10/29/2015    Obesity 10/29/2015    Seasonal allergies 10/15/2015      She  has a past surgical history that includes Tonsillectomy; Cholecystectomy; Dilation and curettage of uterus; Dilation and curettage of uterus;  section; pr lap,fulgurate/excise lesions (Right, 2017); Other surgical history (Right); pr revise median n/carpal tunnel surg (Right, 3/14/2018); Salpingectomy; and IR biopsy other (3/12/2021)  Her family history includes Anxiety disorder in her father; Arthritis in her father and mother; Atrial fibrillation in her mother; COPD in her mother; Depression in her father; Diabetes in her father; Heart disease in her father; Hodgkin's lymphoma in her brother; Hydrocephalus in her father; Hyperlipidemia in her father; Hypertension in her father and mother; Jaundice in her daughter; Lung disease in her maternal uncle; Migraines in her daughter; Other in her daughter and mother; Stomach cancer in her maternal aunt; Urinary tract infection in her father  She  reports that she quit smoking about 6 years ago  Her smoking use included cigarettes  She has a 10 00 pack-year smoking history   She has never used smokeless tobacco  She reports current alcohol use  She reports that she does not use drugs           Current Outpatient Medications   Medication Sig Dispense Refill    albuterol (PROVENTIL HFA,VENTOLIN HFA) 90 mcg/act inhaler Inhale 2 puffs every 6 (six) hours as needed for wheezing        aspirin 81 mg chewable tablet Chew 81 mg daily        cholecalciferol (VITAMIN D3) 1,000 units tablet Take 1,000 Units by mouth daily        Insulin Glargine (BASAGLAR KWIKPEN SC) Inject under the skin        omeprazole (PriLOSEC) 20 mg delayed release capsule Take 20 mg by mouth daily        ondansetron (ZOFRAN-ODT) 4 mg disintegrating tablet Take 1 tablet (4 mg total) by mouth every 8 (eight) hours as needed for nausea or vomiting for up to 20 doses 20 tablet 0    Prenatal Vit-Fe Fumarate-FA (PRENATAL 1+1 PO) Take by mouth        dicyclomine (BENTYL) 20 mg tablet Take 1 tablet (20 mg total) by mouth 2 (two) times a day (Patient not taking: Reported on 2/23/2021) 20 tablet 0    dicyclomine (BENTYL) 20 mg tablet Take 1 tablet (20 mg total) by mouth every 6 (six) hours as needed (cramps) for up to 20 doses (Patient not taking: Reported on 3/25/2021) 20 tablet 0    mometasone (ELOCON) 0 1 % lotion Apply topically daily Use for short duration only (Patient not taking: Reported on 2/10/2020) 60 mL 0    ondansetron (ZOFRAN-ODT) 4 mg disintegrating tablet Take 1 tablet (4 mg total) by mouth every 6 (six) hours as needed for nausea (Patient not taking: Reported on 3/25/2021) 20 tablet 0    predniSONE 10 mg tablet Take 3 tabs BID X 2 days, 2 tabs BID X 2 days, 1 tab BID X 2 days, 1 tab daily X 2 days (Patient not taking: Reported on 8/17/2020) 26 tablet 0    promethazine (PHENERGAN) 25 mg tablet Take 1 tablet (25 mg total) by mouth every 6 (six) hours as needed for nausea or vomiting (Patient not taking: Reported on 3/25/2021) 20 tablet 0    Suprep Bowel Prep Kit 17 5-3 13-1 6 GM/177ML SOLN Take 180 mL by mouth once for 1 dose 180 mL 0      No current facility-administered medications for this visit        She is allergic to amoxicillin        Review of Systems   Constitutional: Negative for activity change, appetite change, chills, diaphoresis, fatigue, fever and unexpected weight change  Gastrointestinal: Positive for abdominal pain  Skin: Negative for color change, pallor, rash and wound           Objective:        /70 (BP Location: Left arm, Patient Position: Sitting, Cuff Size: Extra-Large)   Pulse 60   Temp 97 7 °F (36 5 °C) (Tympanic)   Ht 6' (1 829 m)   Wt 135 kg (297 lb)   LMP 03/07/2021 (Exact Date)   BMI 40 28 kg/m²             Physical Exam  Vitals signs reviewed  Constitutional:       General: She is not in acute distress  Appearance: Normal appearance  She is not ill-appearing, toxic-appearing or diaphoretic  HENT:      Head: Normocephalic and atraumatic  Right Ear: External ear normal       Left Ear: External ear normal    Eyes:      General: No scleral icterus  Right eye: No discharge  Left eye: No discharge  Neck:      Musculoskeletal: Normal range of motion  Cardiovascular:      Rate and Rhythm: Normal rate and regular rhythm  Heart sounds: Normal heart sounds  No murmur  No friction rub  No gallop  Pulmonary:      Effort: Pulmonary effort is normal  No respiratory distress  Breath sounds: Normal breath sounds  No stridor  No wheezing, rhonchi or rales  Abdominal:      General: There is no distension  Palpations: Abdomen is soft  There is mass ( small palpable  mass left lower quadrant)  Tenderness: There is abdominal tenderness (  Overlying the mass)  Musculoskeletal: Normal range of motion  General: No swelling or tenderness  Right lower leg: No edema  Left lower leg: No edema  Skin:     General: Skin is warm and dry  Coloration: Skin is not jaundiced or pale  Findings: No bruising or erythema     Neurological:      General: No focal deficit present  Mental Status: She is alert and oriented to person, place, and time  Cranial Nerves: No cranial nerve deficit  Psychiatric:         Mood and Affect: Mood normal          Behavior: Behavior normal          Thought Content:  Thought content normal          Judgment: Judgment normal

## 2021-05-12 ENCOUNTER — OFFICE VISIT (OUTPATIENT)
Dept: GASTROENTEROLOGY | Facility: CLINIC | Age: 41
End: 2021-05-12
Payer: COMMERCIAL

## 2021-05-12 ENCOUNTER — TELEPHONE (OUTPATIENT)
Dept: SURGERY | Facility: CLINIC | Age: 41
End: 2021-05-12

## 2021-05-12 VITALS
SYSTOLIC BLOOD PRESSURE: 128 MMHG | HEIGHT: 72 IN | WEIGHT: 293 LBS | HEART RATE: 65 BPM | DIASTOLIC BLOOD PRESSURE: 77 MMHG | TEMPERATURE: 97 F | BODY MASS INDEX: 39.68 KG/M2

## 2021-05-12 DIAGNOSIS — K91.5 POST-CHOLECYSTECTOMY SYNDROME: ICD-10-CM

## 2021-05-12 DIAGNOSIS — K58.0 IRRITABLE BOWEL SYNDROME WITH DIARRHEA: ICD-10-CM

## 2021-05-12 DIAGNOSIS — K21.9 GASTROESOPHAGEAL REFLUX DISEASE WITHOUT ESOPHAGITIS: Primary | ICD-10-CM

## 2021-05-12 PROCEDURE — 99214 OFFICE O/P EST MOD 30 MIN: CPT | Performed by: INTERNAL MEDICINE

## 2021-05-12 RX ORDER — MELOXICAM 15 MG/1
TABLET ORAL
COMMUNITY
Start: 2021-05-10

## 2021-05-12 RX ORDER — MELOXICAM 15 MG/1
15 TABLET ORAL DAILY
COMMUNITY
Start: 2021-05-10 | End: 2022-05-10

## 2021-05-12 NOTE — TELEPHONE ENCOUNTER
Patient contacted by phone and response to her inquiry regarding drainage from her wound in the early postoperative period  All questions answered to her satisfaction  She was instructed that if the drainage persists or progresses, she develops pain, fevers or chills that she should notify the office so that she can be brought in and examined  All questions answered to the satisfaction of the patient who is in agreement with the treatment plan outlined above

## 2021-05-12 NOTE — PROGRESS NOTES
[facility] - Outpatient Follow-up Note  Paula Godfrey 45 y o  female MRN: [id number]  Encounter: [id number]          ASSESSMENT AND PLAN:      1  Gastroesophageal reflux disease without esophagitis  I encouraged her to continue the diet and lifestyle modifications and Pepcid as needed  She appears to have functional reflux and I would prefer to avoid omeprazole because the potential for diarrhea and other long-term side effects  2  Irritable bowel syndrome with diarrhea  She most likely has diarrhea predominant irritable bowel syndrome  I encouraged her to take Imodium up to 4 times per day and I explained the exaggerated gastrocolic reflex that patients often have with irritable bowel syndrome  I also asked her to monitor which foods are triggers for her and to try to limit her ingestion of these foods  3  Post-cholecystectomy syndrome  She may have a component of post cholecystectomy syndrome and she believes her symptoms began approximately 15 years ago when she had her gallbladder removed although they have been worsening over time  If her symptoms persist we could consider a cholestyramine trial     ______________________________________________________________________    SUBJECTIVE:  She presents for follow-up after her recent upper endoscopy and colonoscopy  She had diverticulosis but the rest of her findings were normal and her random biopsies of the stomach, duodenum, and colon were negative  She has continued to have occasional reflux symptoms but this has responded well to diet and lifestyle modifications as well as Pepcid as needed  She continues to have diarrhea and lower abdominal pain and cramping especially after meals  She denies bleeding and weight loss      Answers for HPI/ROS submitted by the patient on 5/11/2021   Abdominal pain  Chronicity: chronic  Onset: more than 1 month ago  Onset quality: gradual  Frequency: 2 to 4 times per day  Episode duration: 2 hours  Progression since onset: waxing and waning  Pain location: RUQ, left flank, right flank  Pain - numeric: 5/10  Pain quality: cramping, sharp  Radiates to: suprapubic region  diarrhea: Yes  headaches: Yes  nausea: Yes  Aggravated by: eating  Relieved by: bowel movements  Diagnostic workup: lower endoscopy, surgery, upper endoscopy      REVIEW OF SYSTEMS IS OTHERWISE NEGATIVE  Historical Information   Past Medical History:   Diagnosis Date    Gestational diabetes 2019    Wears glasses      Past Surgical History:   Procedure Laterality Date     SECTION      last assessed 14    CHOLECYSTECTOMY      last assessed 14    COLONOSCOPY      DILATION AND CURETTAGE OF UTERUS      DILATION AND CURETTAGE OF UTERUS      EGD      IR BIOPSY OTHER  3/12/2021    OTHER SURGICAL HISTORY Right     right fallopian tube removed    MO EXC TUMOR SOFT TISSUE ABDOMINAL WALL SUBQ 3+CM Left 2021    Procedure: Excision Lower Quadrant Endometrioma;   Surgeon: Miranda Chi DO;  Location: [facility];  Service: General    MO LAP,FULGURATE/EXCISE LESIONS Right 2017    Procedure: LAPAROSCOPIC OVARIAN CYSTECTOMY VERSUS OOPHERECTOMY;  Surgeon: Crispin Lawrence MD;  Location[facility];  Service: Gynecology    MO REVISE MEDIAN N/CARPAL TUNNEL SURG Right 3/14/2018    Procedure: RELEASE CARPAL TUNNEL;  Surgeon: Severiano Overland, MD;  Location: [facility];  Service: Orthopedics    SALPINGECTOMY      right side    TONSILLECTOMY      last assessed 14     Social History   Social History     Substance and Sexual Activity   Alcohol Use Yes    Frequency: Monthly or less    Comment: rarely; social     Social History     Substance and Sexual Activity   Drug Use No     Social History     Tobacco Use   Smoking Status Former Smoker    Packs/day: 0 50    Years: 20 00    Pack years: 10 00    Types: Cigarettes    Quit date:     Years since quittin 3   Smokeless Tobacco Never Used   Tobacco Comment    2015     Family History   Problem Relation Age of Onset    Arthritis Mother     Atrial fibrillation Mother    Josue Arcos COPD Mother     Hypertension Mother    Josue Arcos Other Mother         urinary incontinence    Depression Father     Anxiety disorder Father     Arthritis Father     Diabetes Father     Hypertension Father     Heart disease Father     Hyperlipidemia Father     Hydrocephalus Father     Urinary tract infection Father     Hodgkin's lymphoma Brother         and non hodgkin's-per allscripts    Stomach cancer Maternal Aunt    Josue Arcos Migraines Daughter     Other Daughter         neuroblastoma    Jaundice Daughter     Lung disease Maternal Uncle         mesothelioma       Meds/Allergies       Current Outpatient Medications:     albuterol (PROVENTIL HFA,VENTOLIN HFA) 90 mcg/act inhaler    meloxicam (MOBIC) 15 mg tablet    meloxicam (MOBIC) 15 mg tablet    omeprazole (PriLOSEC) 20 mg delayed release capsule    ondansetron (ZOFRAN-ODT) 4 mg disintegrating tablet    oxyCODONE-acetaminophen (PERCOCET) 5-325 mg per tablet    Allergies   Allergen Reactions    Amoxicillin Rash           Objective     Blood pressure 128/77, pulse 65, temperature (!) 97 °F (36 1 °C), temperature source Tympanic, height 6' (1 829 m), weight (!) 137 kg (301 lb), last menstrual period 04/18/2021, currently breastfeeding  Body mass index is 40 82 kg/m²  PHYSICAL EXAM:      General Appearance:   Alert, cooperative, no distress   HEENT:   Normocephalic, atraumatic, anicteric      Neck:  Supple, symmetrical, trachea midline   Lungs:   Clear to auscultation bilaterally; no rales, rhonchi or wheezing; respirations unlabored    Heart[de-identified]   Regular rate and rhythm; no murmur, rub, or gallop     Abdomen:   Soft, mild lower abd tenderness, non-distended; normal bowel sounds; no masses, no organomegaly    Genitalia:   Deferred    Rectal:   Deferred    Extremities:  No cyanosis, clubbing or edema    Pulses:  2+ and symmetric  Skin:  No jaundice, rashes, or lesions    Lymph nodes:  No palpable cervical lymphadenopathy        Lab Results:   No visits with results within 1 Day(s) from this visit  Latest known visit with results is:   Admission on 05/05/2021, Discharged on 05/05/2021   Component Date Value    EXT Preg Test, Ur 05/05/2021 Negative     Control 05/05/2021 Valid     Case Report 05/05/2021                      Value:Surgical Pathology Report                         Case: [id number]                                   Authorizing Provider:  Carlos Schwartz DO          Collected:           05/05/2021 1412              Ordering Location:     [facility] Received:            05/05/2021 [facility]                                     Operating Room                                                               Pathologist:           Isidro Brennan MD                                                        Specimen:    Mass, endometroma                                                                          Final Diagnosis 05/05/2021                      Value: This result contains rich text formatting which cannot be displayed here   Additional Information 05/05/2021                      Value: This result contains rich text formatting which cannot be displayed here  Horace Hinds Gross Description 05/05/2021                      Value: This result contains rich text formatting which cannot be displayed here  Radiology Results:   Xr Knee 4+ Views Right Injury    Result Date: 5/2/2021  Narrative: RIGHT KNEE INDICATION:   pain  COMPARISON:  None VIEWS:  XR KNEE 4+ VW RIGHT INJURY FINDINGS: There is no acute fracture or dislocation  There is no joint effusion  Mild osteoarthritis with small osteophytes seen  No lytic or blastic osseous lesion  Soft tissues are unremarkable  Impression: No acute osseous abnormality  Degenerative changes as described   Workstation performed: [id number]     Xr Tibia Fibula 2 Views Right    Result Date: 5/2/2021  Narrative: RIGHT TIBIA AND FIBULA INDICATION:   pain  COMPARISON:  Current right knee study  VIEWS:  XR TIBIA FIBULA 2 VW RIGHT Images: 4 FINDINGS: There is no acute fracture or dislocation  Mild degenerative changes in the knee  No lytic or blastic osseous lesion  Soft tissues are unremarkable  Impression: No acute osseous abnormality  Workstation performed: [id number]     Us Abdominal Wall    Result Date: 5/10/2021  Narrative: ABDOMINAL WALL ULTRASOUND INDICATION:   Left lower quadrant palpable abnormality  COMPARISON:  2/23/2021 TECHNIQUE:   Real-time ultrasound of the abdominal wall was performed with a linear transducer with both volumetric sweeps and still imaging techniques  FINDINGS:  There is a hypoechoic lesion in the abdominal wall overlying the left lower quadrant measuring 1 4 x 1 7 x 0 9 cm  By report, this is consistent with an endometrioma  This lies approximately 2 6 cm deep to the skin  Marking was performed for surgical removal         Impression: Hypoechoic lesion corresponding to the palpable abnormality reported to represent endometrioma  Workstation performed: [id number]     Vas Lower Limb Venous Duplex Study, Unilateral/limited    Result Date: 5/3/2021  Narrative:  [facility] REPORT [facility]IN[facility]: Indications: Patient presents with right lower extremity pain x 1 week  Operative History: No cardiovascular surgeries noted  Risk Factors The patient has no history of DVT  FINDINGS:  Segment  Right            Left              Impression       Impression       CFV      Normal (Patent)  Normal (Patent)     CONCLUSION: Impression: RIGHT LOWER LIMB No evidence of acute or chronic deep vein thrombosis   No evidence of superficial thrombophlebitis noted  Doppler evaluation shows a normal response to augmentation maneuvers  Popliteal, posterior tibial and anterior tibial arterial Doppler waveforms are triphasic    LEFT LOWER LIMB LIMITED Evaluation shows no evidence of thrombus in the common femoral vein  Doppler evaluation shows a normal response to augmentation maneuvers    SIGNATURE: Electronically Signed by: Romina Casas MD on 2021-05-03 09:45:40 AM

## 2021-05-27 ENCOUNTER — OFFICE VISIT (OUTPATIENT)
Dept: SURGERY | Facility: CLINIC | Age: 41
End: 2021-05-27

## 2021-05-27 VITALS
WEIGHT: 293 LBS | HEART RATE: 68 BPM | TEMPERATURE: 98.2 F | BODY MASS INDEX: 39.68 KG/M2 | SYSTOLIC BLOOD PRESSURE: 137 MMHG | HEIGHT: 72 IN | DIASTOLIC BLOOD PRESSURE: 88 MMHG

## 2021-05-27 DIAGNOSIS — N80.9 ENDOMETRIOMA: Primary | ICD-10-CM

## 2021-05-27 PROBLEM — N80.129 ENDOMETRIOMA: Status: ACTIVE | Noted: 2021-05-27

## 2021-05-27 PROCEDURE — 99024 POSTOP FOLLOW-UP VISIT: CPT | Performed by: SURGERY

## 2021-05-27 NOTE — PROGRESS NOTES
Assessment/Plan:    Endometrioma    Status post excision of left lower quadrant   Soft tissue mass  Pathology confirmed endometrioma which is consistent with previous percutaneous biopsy  Incision healing well  Small superficial scab over the more medial aspect of incision but otherwise doing well  Mild tenderness  Patient may follow up eula Jimenez and all orders for this visit:    Endometrioma          Subjective:      Patient ID: Keyonna Burr is a 45 y o  female  58-year-old female status post excision of soft tissue mass left lower quadrant presents today for postop check  Overall doing well  No nausea vomiting  No fevers or chills  Mild tenderness at the site  There was a small stitch that was sticking out the medial portion incision as per the patient's picture that she submitted via messaging  Patient states that she pulled the stitch out itself  Since that time the incisions closed up completely  No drainage  Mild tenderness at the site  The following portions of the patient's history were reviewed and updated as appropriate:   She  has a past medical history of Gestational diabetes (2019) and Wears glasses    She   Patient Active Problem List    Diagnosis Date Noted    Endometrioma 05/27/2021    Irritable bowel syndrome with diarrhea 05/12/2021    Post-cholecystectomy syndrome 05/12/2021    Gastroesophageal reflux disease 04/16/2021    Soft tissue mass 03/07/2021    Generalized abdominal pain 02/24/2021    Alternating constipation and diarrhea 02/24/2021    Nausea 02/24/2021    Prediabetes 10/07/2019    Encounter for gynecological examination (general) (routine) without abnormal findings 11/28/2018    Encounter for IUD removal 11/28/2018    Diverticulosis of intestine without bleeding 10/26/2018    Pelvic pain 09/25/2018    Carpal tunnel syndrome on left 02/22/2018    Mild intermittent asthma without complication 17/27/8530    Hypothyroidism 10/29/2015  Morbid obesity ([facility] [address] ) 10/29/2015    Seasonal allergies 10/15/2015     She  has a past surgical history that includes Tonsillectomy; Cholecystectomy; Dilation and curettage of uterus; Dilation and curettage of uterus;  section; pr lap,fulgurate/excise lesions (Right, 2017); Other surgical history (Right); pr revise median n/carpal tunnel surg (Right, 3/14/2018); Salpingectomy; IR biopsy other (3/12/2021); EGD; Colonoscopy; and pr exc tumor soft tissue abdominal wall subq 3+cm (Left, 2021)  Her family history includes Anxiety disorder in her father; Arthritis in her father and mother; Atrial fibrillation in her mother; COPD in her mother; Depression in her father; Diabetes in her father; Heart disease in her father; Hodgkin's lymphoma in her brother; Hydrocephalus in her father; Hyperlipidemia in her father; Hypertension in her father and mother; Jaundice in her daughter; Lung disease in her maternal uncle; Migraines in her daughter; Other in her daughter and mother; Stomach cancer in her maternal aunt; Urinary tract infection in her father  She  reports that she quit smoking about 6 years ago  Her smoking use included cigarettes  She has a 10 00 pack-year smoking history  She has never used smokeless tobacco  She reports current alcohol use  She reports that she does not use drugs    Current Outpatient Medications   Medication Sig Dispense Refill    albuterol (PROVENTIL HFA,VENTOLIN HFA) 90 mcg/act inhaler Inhale 2 puffs every 6 (six) hours as needed for wheezing      meloxicam (MOBIC) 15 mg tablet Take 15 mg by mouth daily      meloxicam (MOBIC) 15 mg tablet       omeprazole (PriLOSEC) 20 mg delayed release capsule Take 20 mg by mouth daily      ondansetron (ZOFRAN-ODT) 4 mg disintegrating tablet Take 1 tablet (4 mg total) by mouth every 8 (eight) hours as needed for nausea or vomiting for up to 20 doses 20 tablet 0    oxyCODONE-acetaminophen (PERCOCET) 5-325 mg per tablet Take 1 tablet by mouth every 4 (four) hours as needed for moderate pain or severe painMax Daily Amount: 6 tablets 10 tablet 0     No current facility-administered medications for this visit  She is allergic to amoxicillin       Review of Systems   Constitutional: Negative  Skin: Positive for wound (  Incision of left lower quadrant)  Negative for color change, pallor and rash  Objective:      /88   Pulse 68   Temp 98 2 °F (36 8 °C)   Ht 6' (1 829 m)   Wt (!) 139 kg (307 lb)   BMI 41 64 kg/m²          Physical Exam  Vitals signs reviewed  Constitutional:       General: She is not in acute distress  Appearance: Normal appearance  She is not ill-appearing, toxic-appearing or diaphoretic  Skin:     General: Skin is warm  Coloration: Skin is not jaundiced or pale  Findings: No bruising or erythema  Comments: Left lower quadrant transverse incision clean dry intact  Small scab on the medial aspect of the incision  No significant drainage  Mild tender palpation  No blanching erythema noted   Neurological:      Mental Status: She is alert

## 2021-05-27 NOTE — ASSESSMENT & PLAN NOTE
Status post excision of left lower quadrant   Soft tissue mass  Pathology confirmed endometrioma which is consistent with previous percutaneous biopsy  Incision healing well  Small superficial scab over the more medial aspect of incision but otherwise doing well  Mild tenderness  Patient may follow up eula Walton

## 2021-06-16 NOTE — PROGRESS NOTES
Assessment/Plan:        Diagnoses and all orders for this visit:    Morbid obesity ([facility] 75 )  -     Lipid Panel with Direct LDL reflex; Future  -     Comprehensive metabolic panel; Future  -     Hemoglobin A1C; Future  -     CBC and differential; Future  -     Vitamin D 25 hydroxy; Future    BMI 40 0-44 9, adult ([facility] 75 )  -     Ambulatory referral to Weight Management  -     Lipid Panel with Direct LDL reflex; Future  -     Comprehensive metabolic panel; Future  -     Hemoglobin A1C; Future  -     CBC and differential; Future  -     Vitamin D 25 hydroxy; Future    Gastroesophageal reflux disease without esophagitis  -     Lipid Panel with Direct LDL reflex; Future  -     Comprehensive metabolic panel; Future  -     Hemoglobin A1C; Future  -     CBC and differential; Future  -     Vitamin D 25 hydroxy; Future    Mild intermittent asthma without complication  -     Lipid Panel with Direct LDL reflex; Future  -     Comprehensive metabolic panel; Future  -     Hemoglobin A1C; Future  -     CBC and differential; Future  -     Vitamin D 25 hydroxy; Future    Hypothyroidism  -     Lipid Panel with Direct LDL reflex; Future  -     Comprehensive metabolic panel; Future  -     Hemoglobin A1C; Future  -     CBC and differential; Future  -     Vitamin D 25 hydroxy; Future    Prediabetes  -     Lipid Panel with Direct LDL reflex; Future  -     Comprehensive metabolic panel; Future  -     Hemoglobin A1C; Future  -     CBC and differential; Future  -     Vitamin D 25 hydroxy; Future    Vitamin D deficiency  -     Lipid Panel with Direct LDL reflex; Future  -     Comprehensive metabolic panel; Future  -     Hemoglobin A1C; Future  -     CBC and differential; Future  -     Vitamin D 25 hydroxy;  Future    Generalized abdominal pain      -Discussed options of HealthyCORE-Intensive Lifestyle Intervention Program, Very Low Calorie Diet-VLCD, Conservative Program, Mohan-En-Y Gastric Bypass and Vertical Sleeve Gastrectomy and the role of weight loss medications  Not interested in surgery  -Initial weight loss goal of 5-10% weight loss for improved health  -Screening labs-10/13/20, EKG 2/23/21  -Patient is interested in pursuing Conservative Program with meal planning   - get labs prior to appt with me  - will start meal planning with body comp with RD    - interested in AOMs as adjuvant to DARYL for weight loss  Depending on lab results will discuss option next visit  Goals:  Food log (ie ) www myfitnesspal com,sparkpeople  com,loseit com,calorieking  com,etc  baritastic  No sugary beverages  At least 64oz of water daily  Increase physical activity by 10 minutes daily  Gradually increase physical activity to a goal of 5 days per week for 30 minutes of MODERATE intensity PLUS 2 days per week of FULL BODY resistance training  star food journal and bring to RD visit  45 minute visit, >50% face-to-face time spent counseling patient on surgical and nonsurgical interventions for the treatment of excess weight  Discussed the advantages and long-term outcomes with regards to bariatric surgery  Discussed in detail nonsurgical options including intensive lifestyle intervention program, very low-calorie diet program and conservative program   Discussed the role of weight loss medications  Counseled patient on diet behavior and exercise modification for weight loss  Follow up in approximately 2 weeks with Non-Surgical Dietician and 4 weeks with me  Subjective:   Chief Complaint   Patient presents with    Consult     [facility] consult       Patient ID: Carolina Bruno  is a 45 y o  female with excess weight/obesity here to pursue weight management      Past Medical History:   Diagnosis Date    Gestational diabetes 2019    Wears glasses        HPI:  Obesity/Excess Weight:  Severity: class 3  Onset:  Since 14y/o, worsen the last 3 years after pregnacy  Modifiers: Diet and Exercise  Contributing factors: Poor Food Choices and Pregnancy  Associated symptoms: comorbid conditions and increased joint pain    Goals: 200lbs, happy with 220lbs   Hydration:  Alcohol: ocasional  Smoking: no  Exercise: no  Sleep: 5-7hr  STOP ban/8    Social:  Lives with  and 2 kids  Works full  for [employer]  The following portions of the patient's history were reviewed and updated as appropriate: allergies, current medications, past family history, past medical history, past social history, past surgical history and problem list     Review of Systems   Constitutional: Negative for activity change and appetite change  Respiratory: Negative  Cardiovascular: Negative  Gastrointestinal: Negative  Genitourinary: Negative  Musculoskeletal: Negative for arthralgias  Skin: Negative for rash  Psychiatric/Behavioral: Negative  Objective:    /80 (BP Location: Left arm, Patient Position: Sitting, Cuff Size: Large)   Pulse 66   Temp 99 °F (37 2 °C)   Ht 5' 11" (1 803 m)   Wt 136 kg (299 lb 12 8 oz)   BMI 41 81 kg/m²     Physical Exam     Constitutional   General appearance: Abnormal   well developed and morbidly obese  Eyes No conjunctival pallor     Musculoskeletal   Gait and station: Normal     Psychiatric   Orientation to person, place and time: Normal     Affect: appropriate

## 2021-06-17 ENCOUNTER — CONSULT (OUTPATIENT)
Dept: BARIATRICS | Facility: CLINIC | Age: 41
End: 2021-06-17
Payer: COMMERCIAL

## 2021-06-17 VITALS
HEIGHT: 71 IN | DIASTOLIC BLOOD PRESSURE: 80 MMHG | TEMPERATURE: 99 F | WEIGHT: 293 LBS | HEART RATE: 66 BPM | SYSTOLIC BLOOD PRESSURE: 102 MMHG | BODY MASS INDEX: 41.02 KG/M2

## 2021-06-17 DIAGNOSIS — K21.9 GASTROESOPHAGEAL REFLUX DISEASE WITHOUT ESOPHAGITIS: ICD-10-CM

## 2021-06-17 DIAGNOSIS — R10.84 GENERALIZED ABDOMINAL PAIN: ICD-10-CM

## 2021-06-17 DIAGNOSIS — E03.9 HYPOTHYROIDISM: ICD-10-CM

## 2021-06-17 DIAGNOSIS — J45.20 MILD INTERMITTENT ASTHMA WITHOUT COMPLICATION: ICD-10-CM

## 2021-06-17 DIAGNOSIS — R73.03 PREDIABETES: ICD-10-CM

## 2021-06-17 DIAGNOSIS — E66.01 MORBID OBESITY (HCC): Primary | ICD-10-CM

## 2021-06-17 DIAGNOSIS — E55.9 VITAMIN D DEFICIENCY: ICD-10-CM

## 2021-06-17 PROBLEM — G97.1 HEADACHE FOLLOWING LUMBAR PUNCTURE: Status: ACTIVE | Noted: 2021-06-17

## 2021-06-17 PROBLEM — N83.209 OVARIAN CYST: Status: ACTIVE | Noted: 2017-11-09

## 2021-06-17 PROBLEM — Z30.2 REQUEST FOR STERILIZATION: Status: ACTIVE | Noted: 2019-04-04

## 2021-06-17 PROBLEM — Z23 FLU VACCINE NEED: Status: ACTIVE | Noted: 2021-06-17

## 2021-06-17 PROBLEM — R79.89 ELEVATED TSH: Status: ACTIVE | Noted: 2019-04-26

## 2021-06-17 PROBLEM — N94.10 DYSPAREUNIA, FEMALE: Status: ACTIVE | Noted: 2021-01-11

## 2021-06-17 PROBLEM — M77.30 CALCANEAL SPUR: Status: ACTIVE | Noted: 2021-06-17

## 2021-06-17 PROBLEM — Z86.32 HISTORY OF INSULIN CONTROLLED GESTATIONAL DIABETES MELLITUS (GDM): Status: ACTIVE | Noted: 2019-04-26

## 2021-06-17 PROCEDURE — 99243 OFF/OP CNSLTJ NEW/EST LOW 30: CPT | Performed by: FAMILY MEDICINE

## 2021-10-14 ENCOUNTER — OFFICE VISIT (OUTPATIENT)
Dept: URGENT CARE | Facility: CLINIC | Age: 41
End: 2021-10-14
Payer: COMMERCIAL

## 2021-10-14 VITALS
WEIGHT: 293 LBS | TEMPERATURE: 98 F | RESPIRATION RATE: 18 BRPM | OXYGEN SATURATION: 100 % | HEART RATE: 64 BPM | BODY MASS INDEX: 42.82 KG/M2

## 2021-10-14 DIAGNOSIS — U07.1 COVID-19: Primary | ICD-10-CM

## 2021-10-14 PROCEDURE — 99214 OFFICE O/P EST MOD 30 MIN: CPT | Performed by: PHYSICIAN ASSISTANT

## 2021-10-14 RX ORDER — BROMPHENIRAMINE MALEATE, PSEUDOEPHEDRINE HYDROCHLORIDE, AND DEXTROMETHORPHAN HYDROBROMIDE 2; 30; 10 MG/5ML; MG/5ML; MG/5ML
10 SYRUP ORAL 3 TIMES DAILY PRN
Qty: 120 ML | Refills: 0 | Status: CANCELLED | OUTPATIENT
Start: 2021-10-14

## 2021-10-14 RX ORDER — ALBUTEROL SULFATE 90 UG/1
2 AEROSOL, METERED RESPIRATORY (INHALATION) EVERY 6 HOURS PRN
Qty: 8.5 G | Refills: 0 | Status: CANCELLED | OUTPATIENT
Start: 2021-10-14

## 2021-10-26 ENCOUNTER — HOSPITAL ENCOUNTER (EMERGENCY)
Facility: HOSPITAL | Age: 41
Discharge: HOME | End: 2021-10-26
Attending: EMERGENCY MEDICINE
Payer: COMMERCIAL

## 2021-10-26 ENCOUNTER — APPOINTMENT (EMERGENCY)
Dept: RADIOLOGY | Facility: HOSPITAL | Age: 41
End: 2021-10-26
Payer: COMMERCIAL

## 2021-10-26 ENCOUNTER — APPOINTMENT (EMERGENCY)
Dept: CT IMAGING | Facility: HOSPITAL | Age: 41
End: 2021-10-26
Payer: COMMERCIAL

## 2021-10-26 VITALS
SYSTOLIC BLOOD PRESSURE: 101 MMHG | BODY MASS INDEX: 41.64 KG/M2 | OXYGEN SATURATION: 97 % | HEIGHT: 72 IN | DIASTOLIC BLOOD PRESSURE: 57 MMHG | TEMPERATURE: 98.1 F | RESPIRATION RATE: 17 BRPM | HEART RATE: 59 BPM

## 2021-10-26 DIAGNOSIS — R07.9 CHEST PAIN: Primary | ICD-10-CM

## 2021-10-26 DIAGNOSIS — J45.909 ASTHMA WITH SEVERITY TO BE DETERMINED: ICD-10-CM

## 2021-10-26 LAB
ALBUMIN SERPL BCP-MCNC: 3 G/DL (ref 3.5–5)
ALP SERPL-CCNC: 52 U/L (ref 46–116)
ALT SERPL W P-5'-P-CCNC: 25 U/L (ref 12–78)
ANION GAP SERPL CALCULATED.3IONS-SCNC: 8 MMOL/L (ref 4–13)
APTT PPP: 27 SECONDS (ref 23–37)
AST SERPL W P-5'-P-CCNC: 12 U/L (ref 5–45)
BASOPHILS # BLD AUTO: 0.07 THOUSANDS/ΜL (ref 0–0.1)
BASOPHILS NFR BLD AUTO: 1 % (ref 0–1)
BILIRUB DIRECT SERPL-MCNC: 0.07 MG/DL (ref 0–0.2)
BILIRUB SERPL-MCNC: 0.18 MG/DL (ref 0.2–1)
BUN SERPL-MCNC: 7 MG/DL (ref 5–25)
CALCIUM SERPL-MCNC: 8.6 MG/DL (ref 8.3–10.1)
CHLORIDE SERPL-SCNC: 104 MMOL/L (ref 100–108)
CO2 SERPL-SCNC: 25 MMOL/L (ref 21–32)
CREAT SERPL-MCNC: 0.58 MG/DL (ref 0.6–1.3)
D DIMER PPP FEU-MCNC: 0.87 UG/ML FEU
EOSINOPHIL # BLD AUTO: 0.22 THOUSAND/ΜL (ref 0–0.61)
EOSINOPHIL NFR BLD AUTO: 3 % (ref 0–6)
ERYTHROCYTE [DISTWIDTH] IN BLOOD BY AUTOMATED COUNT: 12.7 % (ref 11.6–15.1)
GFR SERPL CREATININE-BSD FRML MDRD: 116 ML/MIN/1.73SQ M
GLUCOSE SERPL-MCNC: 84 MG/DL (ref 65–140)
HCT VFR BLD AUTO: 33.2 % (ref 34.8–46.1)
HGB BLD-MCNC: 11.3 G/DL (ref 11.5–15.4)
IMM GRANULOCYTES # BLD AUTO: 0.03 THOUSAND/UL (ref 0–0.2)
IMM GRANULOCYTES NFR BLD AUTO: 0 % (ref 0–2)
INR PPP: 0.98 (ref 0.84–1.19)
LYMPHOCYTES # BLD AUTO: 1.76 THOUSANDS/ΜL (ref 0.6–4.47)
LYMPHOCYTES NFR BLD AUTO: 24 % (ref 14–44)
MCH RBC QN AUTO: 29.8 PG (ref 26.8–34.3)
MCHC RBC AUTO-ENTMCNC: 34 G/DL (ref 31.4–37.4)
MCV RBC AUTO: 88 FL (ref 82–98)
MONOCYTES # BLD AUTO: 0.4 THOUSAND/ΜL (ref 0.17–1.22)
MONOCYTES NFR BLD AUTO: 5 % (ref 4–12)
NEUTROPHILS # BLD AUTO: 4.97 THOUSANDS/ΜL (ref 1.85–7.62)
NEUTS SEG NFR BLD AUTO: 67 % (ref 43–75)
NRBC BLD AUTO-RTO: 0 /100 WBCS
PLATELET # BLD AUTO: 259 THOUSANDS/UL (ref 149–390)
PMV BLD AUTO: 9.7 FL (ref 8.9–12.7)
POTASSIUM SERPL-SCNC: 3.6 MMOL/L (ref 3.5–5.3)
PROT SERPL-MCNC: 6.7 G/DL (ref 6.4–8.2)
PROTHROMBIN TIME: 12.5 SECONDS (ref 11.6–14.5)
RBC # BLD AUTO: 3.79 MILLION/UL (ref 3.81–5.12)
SODIUM SERPL-SCNC: 137 MMOL/L (ref 136–145)
TROPONIN I SERPL-MCNC: <0.02 NG/ML
TROPONIN I SERPL-MCNC: <0.02 NG/ML
WBC # BLD AUTO: 7.45 THOUSAND/UL (ref 4.31–10.16)

## 2021-10-26 PROCEDURE — 71275 CT ANGIOGRAPHY CHEST: CPT

## 2021-10-26 PROCEDURE — 85730 THROMBOPLASTIN TIME PARTIAL: CPT | Performed by: EMERGENCY MEDICINE

## 2021-10-26 PROCEDURE — 80076 HEPATIC FUNCTION PANEL: CPT | Performed by: EMERGENCY MEDICINE

## 2021-10-26 PROCEDURE — G1004 CDSM NDSC: HCPCS

## 2021-10-26 PROCEDURE — 99285 EMERGENCY DEPT VISIT HI MDM: CPT

## 2021-10-26 PROCEDURE — 71045 X-RAY EXAM CHEST 1 VIEW: CPT

## 2021-10-26 PROCEDURE — 85379 FIBRIN DEGRADATION QUANT: CPT | Performed by: EMERGENCY MEDICINE

## 2021-10-26 PROCEDURE — 99284 EMERGENCY DEPT VISIT MOD MDM: CPT | Performed by: EMERGENCY MEDICINE

## 2021-10-26 PROCEDURE — 85610 PROTHROMBIN TIME: CPT | Performed by: EMERGENCY MEDICINE

## 2021-10-26 PROCEDURE — 80048 BASIC METABOLIC PNL TOTAL CA: CPT | Performed by: EMERGENCY MEDICINE

## 2021-10-26 PROCEDURE — 36415 COLL VENOUS BLD VENIPUNCTURE: CPT | Performed by: EMERGENCY MEDICINE

## 2021-10-26 PROCEDURE — 84484 ASSAY OF TROPONIN QUANT: CPT | Performed by: EMERGENCY MEDICINE

## 2021-10-26 PROCEDURE — 85025 COMPLETE CBC W/AUTO DIFF WBC: CPT | Performed by: EMERGENCY MEDICINE

## 2021-10-26 RX ORDER — ALBUTEROL SULFATE 90 UG/1
2 AEROSOL, METERED RESPIRATORY (INHALATION) EVERY 6 HOURS PRN
Qty: 6.7 G | Refills: 0 | Status: SHIPPED | OUTPATIENT
Start: 2021-10-26

## 2021-10-26 RX ADMIN — IOHEXOL 85 ML: 350 INJECTION, SOLUTION INTRAVENOUS at 17:18

## 2022-07-01 ENCOUNTER — OFFICE VISIT (OUTPATIENT)
Dept: URGENT CARE | Facility: MEDICAL CENTER | Age: 42
End: 2022-07-01
Payer: COMMERCIAL

## 2022-07-01 VITALS
HEIGHT: 72 IN | RESPIRATION RATE: 18 BRPM | BODY MASS INDEX: 38.19 KG/M2 | SYSTOLIC BLOOD PRESSURE: 120 MMHG | OXYGEN SATURATION: 96 % | HEART RATE: 58 BPM | DIASTOLIC BLOOD PRESSURE: 72 MMHG | TEMPERATURE: 97.7 F | WEIGHT: 282 LBS

## 2022-07-01 DIAGNOSIS — J01.90 ACUTE NON-RECURRENT SINUSITIS, UNSPECIFIED LOCATION: Primary | ICD-10-CM

## 2022-07-01 PROCEDURE — 99212 OFFICE O/P EST SF 10 MIN: CPT | Performed by: PHYSICIAN ASSISTANT

## 2022-07-01 RX ORDER — AZITHROMYCIN 250 MG/1
TABLET, FILM COATED ORAL
Qty: 6 TABLET | Refills: 0 | Status: SHIPPED | OUTPATIENT
Start: 2022-07-01 | End: 2022-07-05

## 2022-07-01 NOTE — PROGRESS NOTES
[facility]        NAME: Jamin Morejon is a 40 y o  female  : 1980    MRN: [id number]  DATE: 2022  TIME: 5:25 PM    Assessment and Plan   Acute non-recurrent sinusitis, unspecified location [J01 90]  1  Acute non-recurrent sinusitis, unspecified location  azithromycin (ZITHROMAX) 250 mg tablet         Patient Instructions       Follow up with PCP in 3-5 days  Proceed to  ER if symptoms worsen  Chief Complaint     Chief Complaint   Patient presents with    Cold Like Symptoms     Sinus pressure and congestion with post nasal drip x 2 weeks          History of Present Illness       Patient presents with a 2 week history of sinus pressure nasal congestion postnasal drip and purulent nasal drainage  No fever chills cough nausea vomiting diarrhea body aches headaches  No known exposure to COVID  She is breast-feeding  Review of Systems   Review of Systems   Constitutional: Negative for chills and fever  HENT: Positive for congestion, postnasal drip and sinus pressure  Negative for sore throat  Respiratory: Negative for cough  Gastrointestinal: Negative for diarrhea, nausea and vomiting  Musculoskeletal: Negative for myalgias  Neurological: Negative for headaches           Current Medications       Current Outpatient Medications:     azithromycin (ZITHROMAX) 250 mg tablet, Take 2 tablets today then 1 tablet daily x 4 days, Disp: 6 tablet, Rfl: 0    albuterol (PROVENTIL HFA,VENTOLIN HFA) 90 mcg/act inhaler, Inhale 2 puffs every 6 (six) hours as needed for wheezing (Patient not taking: Reported on 2022), Disp: 6 7 g, Rfl: 0    meloxicam (MOBIC) 15 mg tablet, Take 15 mg by mouth daily (Patient not taking: Reported on 10/14/2021), Disp: , Rfl:     meloxicam (MOBIC) 15 mg tablet, , Disp: , Rfl:     naproxen (NAPROSYN) 500 mg tablet, Take 500 mg by mouth (Patient not taking: No sig reported), Disp: , Rfl:     omeprazole (PriLOSEC) 20 mg delayed release capsule, Take 20 mg by mouth daily (Patient not taking: No sig reported), Disp: , Rfl:     ondansetron (ZOFRAN-ODT) 4 mg disintegrating tablet, Take 1 tablet (4 mg total) by mouth every 8 (eight) hours as needed for nausea or vomiting for up to 20 doses (Patient not taking: No sig reported), Disp: 20 tablet, Rfl: 0    Current Allergies     Allergies as of 2022 - Reviewed 2022   Allergen Reaction Noted    Amoxicillin Rash 2014            The following portions of the patient's history were reviewed and updated as appropriate: allergies, current medications, past family history, past medical history, past social history, past surgical history and problem list      Past Medical History:   Diagnosis Date    Chest pain     Diarrhea     Diverticulitis     Gestational diabetes 2019    Headache     Heartburn     History of rashes as a child     IBS (irritable bowel syndrome)     Increased frequency of urination     Intolerance to cold     and heat    Nausea     Polycystic ovary syndrome     Wears glasses        Past Surgical History:   Procedure Laterality Date     SECTION      last assessed 14    CHOLECYSTECTOMY      last assessed 14    COLONOSCOPY      DILATION AND CURETTAGE OF UTERUS      DILATION AND CURETTAGE OF UTERUS      EGD      IR BIOPSY OTHER  3/12/2021    OTHER SURGICAL HISTORY Right     right fallopian tube removed    SC EXC TUMOR SOFT TISSUE ABDOMINAL WALL SUBQ 3+CM Left 2021    Procedure: Excision Lower Quadrant Endometrioma;   Surgeon: Brian Contreras DO;  [facility];  Service: General    SC LAP,FULGURATE/EXCISE LESIONS Right 2017    Procedure: LAPAROSCOPIC OVARIAN CYSTECTOMY VERSUS OOPHERECTOMY;  Surgeon: Jeferson Mckinley MD;  [facility];  Service: Gynecology    SC REVISE MEDIAN N/CARPAL TUNNEL SURG Right 3/14/2018    Procedure: RELEASE CARPAL TUNNEL;  Surgeon: Suraj Farmer MD;  [facility];  Service: Orthopedics   Mariel Balderrama SALPINGECTOMY      right side    TONSILLECTOMY      last assessed 07/17/14       Family History   Problem Relation Age of Onset    Arthritis Mother     Atrial fibrillation Mother     COPD Mother     Hypertension Mother     Other Mother         urinary incontinence    Depression Father     Anxiety disorder Father     Arthritis Father     Diabetes Father     Hypertension Father     Heart disease Father     Hyperlipidemia Father     Hydrocephalus Father     Urinary tract infection Father     Hodgkin's lymphoma Brother         and non hodgkin's-per allscripts    Stomach cancer Maternal Aunt    [facility] Migraines Daughter     Other Daughter         neuroblastoma    Jaundice Daughter     Lung disease Maternal Uncle         mesothelioma         Medications have been verified  Objective   /72   Pulse 58   Temp 97 7 °F (36 5 °C)   Resp 18   Ht 6' (1 829 m)   Wt 128 kg (282 lb)   SpO2 96%   Breastfeeding Yes   BMI 38 25 kg/m²   No LMP recorded  Physical Exam     Physical Exam  Vitals and nursing note reviewed  Constitutional:       Appearance: Normal appearance  HENT:      Head: Normocephalic and atraumatic  Right Ear: Tympanic membrane normal       Left Ear: Tympanic membrane normal       Mouth/Throat:      Mouth: Mucous membranes are moist       Pharynx: Oropharynx is clear  Eyes:      Conjunctiva/sclera: Conjunctivae normal    Cardiovascular:      Rate and Rhythm: Normal rate and regular rhythm  Heart sounds: Normal heart sounds  Pulmonary:      Effort: Pulmonary effort is normal       Breath sounds: Normal breath sounds  Skin:     General: Skin is warm  Neurological:      Mental Status: She is alert

## 2022-10-28 ENCOUNTER — OFFICE VISIT (OUTPATIENT)
Dept: URGENT CARE | Facility: MEDICAL CENTER | Age: 42
End: 2022-10-28
Payer: COMMERCIAL

## 2022-10-28 VITALS
RESPIRATION RATE: 18 BRPM | HEIGHT: 72 IN | BODY MASS INDEX: 39.68 KG/M2 | SYSTOLIC BLOOD PRESSURE: 130 MMHG | WEIGHT: 293 LBS | DIASTOLIC BLOOD PRESSURE: 70 MMHG | OXYGEN SATURATION: 98 % | HEART RATE: 89 BPM | TEMPERATURE: 97.6 F

## 2022-10-28 DIAGNOSIS — J01.90 ACUTE SINUSITIS, RECURRENCE NOT SPECIFIED, UNSPECIFIED LOCATION: Primary | ICD-10-CM

## 2022-10-28 PROCEDURE — 99213 OFFICE O/P EST LOW 20 MIN: CPT | Performed by: PHYSICIAN ASSISTANT

## 2022-10-28 RX ORDER — AZITHROMYCIN 250 MG/1
TABLET, FILM COATED ORAL
Qty: 6 TABLET | Refills: 0 | Status: SHIPPED | OUTPATIENT
Start: 2022-10-28 | End: 2022-11-01

## 2022-10-28 NOTE — PROGRESS NOTES
[facility]        NAME: Tl Nix is a 40 y o  female  : 1980    MRN: [id number]  DATE: 2022  TIME: 6:41 PM    Assessment and Plan   Acute sinusitis, recurrence not specified, unspecified location [J01 90]  1  Acute sinusitis, recurrence not specified, unspecified location  azithromycin (ZITHROMAX) 250 mg tablet         Patient Instructions       Follow up with PCP in 3-5 days  Proceed to  ER if symptoms worsen  Chief Complaint     Chief Complaint   Patient presents with   • Cold Like Symptoms     Sinus pressure and congestion that started on Monday          History of Present Illness       Patient is a 39 yo female who presents with a cc of sinus congestion and sinus pain/pressure x 5 days  Denies fevers, headaches, ear pain, cough, SOB  Review of Systems   Review of Systems   Constitutional: Negative for chills and fever  HENT: Positive for congestion, sinus pressure and sinus pain  Negative for ear pain and sore throat  Respiratory: Negative for cough and shortness of breath  Neurological: Negative for headaches           Current Medications       Current Outpatient Medications:   •  albuterol (PROVENTIL HFA,VENTOLIN HFA) 90 mcg/act inhaler, Inhale 2 puffs every 6 (six) hours as needed for wheezing, Disp: 6 7 g, Rfl: 0  •  azithromycin (ZITHROMAX) 250 mg tablet, Take 2 tablets today then 1 tablet daily x 4 days, Disp: 6 tablet, Rfl: 0  •  meloxicam (MOBIC) 15 mg tablet, Take 15 mg by mouth daily (Patient not taking: Reported on 10/14/2021), Disp: , Rfl:   •  meloxicam (MOBIC) 15 mg tablet, , Disp: , Rfl:   •  naproxen (NAPROSYN) 500 mg tablet, Take 500 mg by mouth (Patient not taking: Reported on 10/28/2022), Disp: , Rfl:   •  omeprazole (PriLOSEC) 20 mg delayed release capsule, Take 20 mg by mouth daily (Patient not taking: Reported on 10/28/2022), Disp: , Rfl:   •  ondansetron (ZOFRAN-ODT) 4 mg disintegrating tablet, Take 1 tablet (4 mg total) by mouth every 8 (eight) hours as needed for nausea or vomiting for up to 20 doses (Patient not taking: Reported on 10/28/2022), Disp: 20 tablet, Rfl: 0    Current Allergies     Allergies as of 10/28/2022 - Reviewed 10/28/2022   Allergen Reaction Noted   • Amoxicillin Rash 2014            The following portions of the patient's history were reviewed and updated as appropriate: allergies, current medications, past family history, past medical history, past social history, past surgical history and problem list      Past Medical History:   Diagnosis Date   • Chest pain    • Diarrhea    • Diverticulitis    • Gestational diabetes    • Headache    • Heartburn    • History of rashes as a child    • IBS (irritable bowel syndrome)    • Increased frequency of urination    • Intolerance to cold     and heat   • Nausea    • Polycystic ovary syndrome    • Wears glasses        Past Surgical History:   Procedure Laterality Date   •  SECTION      last assessed 14   • CHOLECYSTECTOMY      last assessed 14   • COLONOSCOPY     • DILATION AND CURETTAGE OF UTERUS     • DILATION AND CURETTAGE OF UTERUS     • EGD     • IR BIOPSY OTHER  3/12/2021   • OTHER SURGICAL HISTORY Right     right fallopian tube removed   • FL EXC TUMOR SOFT TISSUE ABDOMINAL WALL SUBQ 3+CM Left 2021    Procedure: Excision Lower Quadrant Endometrioma;   Surgeon: Edmundo Parks DO;  Location: [facility];  Service: General   • FL LAP,FULGURATE/EXCISE LESIONS Right 2017    Procedure: LAPAROSCOPIC OVARIAN CYSTECTOMY VERSUS OOPHERECTOMY;  Surgeon: Adalgisa Maki MD;  [facility];  Service: Gynecology   • FL REVISE MEDIAN N/CARPAL TUNNEL SURG Right 3/14/2018    Procedure: RELEASE CARPAL TUNNEL;  Surgeon: Juany Alfonso MD;  [facility];  Service: Orthopedics   • SALPINGECTOMY      right side   • TONSILLECTOMY      last assessed 14       Family History   Problem Relation Age of Onset   • Arthritis Mother    • Atrial fibrillation Mother    • COPD Mother    • Hypertension Mother    • Other Mother         urinary incontinence   • Depression Father    • Anxiety disorder Father    • Arthritis Father    • Diabetes Father    • Hypertension Father    • Heart disease Father    • Hyperlipidemia Father    • Hydrocephalus Father    • Urinary tract infection Father    • Hodgkin's lymphoma Brother         and non hodgkin's-per allscripts   • Stomach cancer Maternal Aunt    • Migraines Daughter    • Other Daughter         neuroblastoma   • Jaundice Daughter    • Lung disease Maternal Uncle         mesothelioma         Medications have been verified  Objective   /70   Pulse 89   Temp 97 6 °F (36 4 °C)   Resp 18   Ht 6' (1 829 m)   Wt (!) 137 kg (301 lb)   SpO2 98%   Breastfeeding Yes   BMI 40 82 kg/m²        Physical Exam     Physical Exam  Constitutional:       General: She is not in acute distress  Appearance: Normal appearance  She is not ill-appearing or diaphoretic  HENT:      Right Ear: Tympanic membrane, ear canal and external ear normal       Left Ear: Tympanic membrane, ear canal and external ear normal       Nose: Congestion present  Right Sinus: Maxillary sinus tenderness and frontal sinus tenderness present  Left Sinus: Maxillary sinus tenderness and frontal sinus tenderness present  Mouth/Throat:      Mouth: Mucous membranes are moist       Pharynx: Oropharynx is clear  Eyes:      Conjunctiva/sclera: Conjunctivae normal    Cardiovascular:      Rate and Rhythm: Normal rate and regular rhythm  Heart sounds: Normal heart sounds  Pulmonary:      Effort: Pulmonary effort is normal       Breath sounds: Normal breath sounds  Skin:     General: Skin is warm and dry  Neurological:      Mental Status: She is alert     Psychiatric:         Mood and Affect: Mood normal          Behavior: Behavior normal

## 2023-02-28 NOTE — OP NOTE
Orthopedics RELEASE CARPAL TUNNEL  Op Note      Pre-op Diagnosis:   Right carpal tunnel syndrome    Post-op Diagnosis:  same    Procedure:  RIGHT RELEASE CARPAL TUNNEL    Surgeon:  Surgeon(s):  Sarah Headley MD     I was present for the entire procedure    Anesthesia:  Local    Tourniquet Time:  none    Estimated Blood Loss:  none    Material sent to lab:  none      Complications:  None    Findings: Thickened transverse carpal ligament  Flattened median nerve    Indications:  A 40 y o  y/o female with pain and numbness in his right hand with exam and EMG findings consistent with carpal tunnel syndrome  The symptoms were unresponsive to nonoperative management  Treatment options were discussed, and the patient wished to proceed with surgical intervention  Risks and benefits of surgery were discussed which included but were not limited to infection, neurovascular damage, incomplete resolution of symptoms, wound healing problems, stiffness, need for further surgery, pillar pain  Informed consent was obtained  Procedure: The patient was met preoperatively and the right wrist was identified and signed  The patient was taken back to the operating room where a Local anesthetic was performed  The arm was sterilely prepped and draped in the usual fashion  A timeout was held identifying the patient, side, site, antibiotics, and allergies  The patient received no antibiotically preoperatively  The arm was exsanguinated with an Esmarch and the the proximal aspect of the Esmarch was left around the forearm  I made a longitudinal incision 3 cm, along the radial border of the ring finger over the carpal tunnel  I carefully dissected down through the skin and soft tissue layer, down to the transverse carpal ligament  I then elevated carefully the soft tissue off the transverse carpal ligament, and once clear, under direct visualization, incised the ligament with a 15 blade   As soon as I was able I placed a freer underneath the ligament and proceeded to further release the ligament with a knife under direct visualization, with the Yevgeniy Temple protecting the nerve  For the more proximal and distal aspects of the transverse carpal ligament, tenotomy scissors were used, being careful to spread above and below the ligament prior to cutting, keeping the tips under direct visualization  Once I was comfortable that a complete release had been performed the tourniquet was let down  Adequate hemostasis was obtained  The incision was closed with 4-0 Nylon in a vertical mattress fashion  A bulky sterile dressing was placed  Disposition:  Patient tolerated the procedure well and was taken to recovery postoperatively      Plan:  - Patient will follow-up 7-10 days postoperatively  - Patient will be educated on elevating and icing the wrist  - Range of motion of the fingers is encouraged  - The dressing should remain on until follow-up      @[facility]@     Date: 3/14/2018  Time: 7:58 AM unknown

## 2024-06-18 NOTE — PROGRESS NOTES
Assessment:     1  Carpal tunnel syndrome on left    2  Right carpal tunnel syndrome        Plan:     Problem List Items Addressed This Visit        Nervous and Auditory    Right carpal tunnel syndrome     Findings and treatment options were discussed with the patient  Patient continues to have significant symptoms despite use of wrist brace at night  She would like to proceed with a right carpal tunnel release surgery  Risks, benefits and complications of surgery were discussed in detail by Dr Laird Cowden and informed consent was obtained  All questions were answered to patient's satisfaction  Relevant Orders    Case request operating room: RELEASE CARPAL TUNNEL (Completed)    Carpal tunnel syndrome on left - Primary     Patient will continue use of wrist brace at night since symptoms are not as severe as the right  Discussed possible carpal tunnel release surgery in the future if symptoms worsen  Subjective:     Patient ID: Erick Wilson is a 40 y o  female  Chief Complaint: This is a 80-year-old female complaining of pain, numbness and tingling in the fingers of her bilateral hands for a little over a year  The symptoms have progressively been getting worse  The right side is worse than the left as well  The symptoms are worse with writing and when lying down at night  She has been using bilateral wrist braces for the past several months with minimal relief  She feels the symptoms in all of her fingers except her small fingers  She had an EMG done in March of 2017 that showed bilateral moderate to severe carpal tunnel syndrome  She has had her thyroid tested and it was normal  She is interested in discussing surgery at this point        Allergy:  Allergies   Allergen Reactions    Amoxicillin Rash     Medications:  all current active meds have been reviewed  Past Medical History:  Past Medical History:   Diagnosis Date    Asthma      Past Surgical History:  Past Surgical History: CALLED PT TO SCHEDULE A REGULAR STRESS TEST. LEFT MESSAGE TO CALL BACK TO SCHEDULE.   Procedure Laterality Date     SECTION      CHOLECYSTECTOMY      DILATION AND CURETTAGE OF UTERUS      DILATION AND CURETTAGE OF UTERUS      OTHER SURGICAL HISTORY Right     right fallopian tube removed    PA LAP,FULGURATE/EXCISE LESIONS Right 2017    Procedure: LAPAROSCOPIC OVARIAN CYSTECTOMY VERSUS OOPHERECTOMY;  Surgeon: Brock Ramirez MD;  Location[facility];  Service: Gynecology    TONSILLECTOMY       Family History:  Family History   Problem Relation Age of Onset    Arthritis Mother     Atrial fibrillation Mother     COPD Mother     Hypertension Mother     Depression Father     Anxiety disorder Father     Arthritis Father     Diabetes Father     Hypertension Father     Heart disease Father     Hyperlipidemia Father     Hydrocephalus Father     Hodgkin's lymphoma Brother     Stomach cancer Maternal Aunt     Migraines Daughter      Social History:  History   Alcohol Use    Yes     Comment: rarely     History   Drug Use No     History   Smoking Status    Former Smoker    Packs/day: 0 50    Types: Cigarettes   Smokeless Tobacco    Never Used     Review of Systems   HENT: Negative  Eyes: Negative  Respiratory: Negative  Cardiovascular: Negative  Gastrointestinal: Negative  Endocrine: Negative  Genitourinary: Negative  Musculoskeletal: Positive for arthralgias  Skin: Negative  Allergic/Immunologic: Negative  Neurological: Positive for weakness and numbness  Hematological: Negative  Psychiatric/Behavioral: Negative  Objective:  BP Readings from Last 1 Encounters:   18 128/70      Wt Readings from Last 1 Encounters:   18 136 kg (300 lb)      BMI:   Estimated body mass index is 40 69 kg/m² as calculated from the following:    Height as of this encounter: 6' (1 829 m)  Weight as of this encounter: 136 kg (300 lb)    BSA:   Estimated body surface area is 2 53 meters squared as calculated from the following:    Height as of this encounter: 6' (1 829 m)  Weight as of this encounter: 136 kg (300 lb)  Physical Exam   Constitutional: She is oriented to person, place, and time  She appears well-developed  HENT:   Head: Normocephalic and atraumatic  Eyes: EOM are normal  Pupils are equal, round, and reactive to light  Neck: Neck supple  Cardiovascular: Normal heart sounds and intact distal pulses  Pulmonary/Chest: Effort normal and breath sounds normal    Musculoskeletal: She exhibits no tenderness  Neurological: She is alert and oriented to person, place, and time  Skin: Skin is warm  Psychiatric: She has a normal mood and affect  Nursing note and vitals reviewed  Right Hand Exam     Tenderness   The patient is experiencing no tenderness  Range of Motion   The patient has normal right wrist ROM  Muscle Strength   Wrist Extension: 5/5   Wrist Flexion: 5/5   : 4/5     Tests   Phalens Sign: positive  Tinels Sign (Medial Nerve): positive  Finkelstein: negative    Other   Erythema: absent  Scars: absent  Right hand sensation: Decreased over median nerve distribution  Pulse: present      Left Hand Exam     Tenderness   The patient is experiencing no tenderness  Range of Motion   The patient has normal left wrist ROM  Muscle Strength   Wrist Extension: 5/5   Wrist Flexion: 5/5   :  5/5     Tests   Phalens Sign: positive  Tinels Sign (Medial Nerve): negative  Finkelstein: negative    Other   Erythema: absent  Scars: absent  Sensation: normal  Pulse: present              EMG of the bilateral upper extremities reveal moderate to severe carpal tunnel syndrome, right worse than left

## (undated) DEVICE — GLOVE SRG BIOGEL ECLIPSE 7

## (undated) DEVICE — MEDI-VAC YANK SUCT HNDL W/TPRD BULBOUS TIP: Brand: CARDINAL HEALTH

## (undated) DEVICE — ENDOPATH XCEL BLADELESS TROCARS WITH STABILITY SLEEVES: Brand: ENDOPATH XCEL

## (undated) DEVICE — 3M™ TEGADERM™ TRANSPARENT FILM DRESSING FRAME STYLE, 1626W, 4 IN X 4-3/4 IN (10 CM X 12 CM), 50/CT 4CT/CASE: Brand: 3M™ TEGADERM™

## (undated) DEVICE — SYRINGE 10ML LL

## (undated) DEVICE — GLOVE INDICATOR PI UNDERGLOVE SZ 7 BLUE

## (undated) DEVICE — GLOVE SRG BIOGEL 7

## (undated) DEVICE — PAD GROUNDING ADULT

## (undated) DEVICE — TRAY FOLEY 16FR URIMETER SURESTEP

## (undated) DEVICE — OCCLUSIVE GAUZE STRIP,3% BISMUTH TRIBROMOPHENATE IN PETROLATUM BLEND: Brand: XEROFORM

## (undated) DEVICE — INTENDED FOR TISSUE SEPARATION, AND OTHER PROCEDURES THAT REQUIRE A SHARP SURGICAL BLADE TO PUNCTURE OR CUT.: Brand: BARD-PARKER SAFETY BLADES SIZE 15, STERILE

## (undated) DEVICE — ADHESIVE SKN CLSR HISTOACRYL FLEX 0.5ML LF

## (undated) DEVICE — CURITY STRETCH BANDAGE: Brand: CURITY

## (undated) DEVICE — UNIVERSAL GYN LAPAROSCOPY,KIT: Brand: CARDINAL HEALTH

## (undated) DEVICE — INTENDED FOR TISSUE SEPARATION, AND OTHER PROCEDURES THAT REQUIRE A SHARP SURGICAL BLADE TO PUNCTURE OR CUT.: Brand: BARD-PARKER SAFETY BLADES SIZE 11, STERILE

## (undated) DEVICE — GAUZE SPONGES,16 PLY: Brand: CURITY

## (undated) DEVICE — LIGHT HANDLE COVER SLEEVE DISP BLUE STELLAR

## (undated) DEVICE — PREMIUM DRY TRAY LF: Brand: MEDLINE INDUSTRIES, INC.

## (undated) DEVICE — NEEDLE 25G X 1 1/2

## (undated) DEVICE — CHLORHEXIDINE 4PCT 4 OZ

## (undated) DEVICE — ENDOCATCH BAG 15MM

## (undated) DEVICE — IRRIG ENDO FLO TUBING

## (undated) DEVICE — SUT VICRYL 3-0 SH 27 IN J416H

## (undated) DEVICE — U-DRAPE: Brand: CONVERTORS

## (undated) DEVICE — GAUZE SPONGES,8 PLY: Brand: CURITY

## (undated) DEVICE — INSUFFLATION TUBING PRIMFLO

## (undated) DEVICE — VIAL DECANTER

## (undated) DEVICE — TUBING SUCTION 5MM X 12 FT

## (undated) DEVICE — 10FR FRAZIER SUCTION HANDLE: Brand: CARDINAL HEALTH

## (undated) DEVICE — ENDOPOUCH RETRIEVER SPECIMEN RETRIEVAL BAGS: Brand: ENDOPOUCH RETRIEVER

## (undated) DEVICE — SUT ETHILON 4-0 PS-2 18 IN 1667H

## (undated) DEVICE — PLUMEPEN PRO 10FT

## (undated) DEVICE — PACK PLASTIC HAND PBDS

## (undated) DEVICE — PREP SURGICAL PURPREP 26ML

## (undated) DEVICE — 3M™ STERI-STRIP™ REINFORCED ADHESIVE SKIN CLOSURES, R1546, 1/4 IN X 4 IN (6 MM X 100 MM), 10 STRIPS/ENVELOPE: Brand: 3M™ STERI-STRIP™

## (undated) DEVICE — BETHLEHEM UNIVERSAL MINOR GEN: Brand: CARDINAL HEALTH

## (undated) DEVICE — SWABSTCK, BENZOIN TINCTURE, 1/PK, STRL: Brand: APLICARE

## (undated) DEVICE — 3M™ TEGADERM™ TRANSPARENT FILM DRESSING FRAME STYLE, 1624W, 2-3/8 IN X 2-3/4 IN (6 CM X 7 CM), 100/CT 4CT/CASE: Brand: 3M™ TEGADERM™

## (undated) DEVICE — CHLORAPREP HI-LITE 26ML ORANGE

## (undated) DEVICE — ENSEAL LAPAROSCOPIC TISSUE SEALER G2 CURVED JAW FOR USE WITH G2 GENERATOR 5MM DIAMETER 35CM SHAFT LENGTH: Brand: ENSEAL

## (undated) DEVICE — SUT MONOCRYL 4-0 PS-2 18 IN Y496G

## (undated) DEVICE — GLOVE INDICATOR PI UNDERGLOVE SZ 7.5 BLUE

## (undated) DEVICE — STERILE SURGICAL LUBRICANT,  TUBE: Brand: SURGILUBE

## (undated) DEVICE — ACE WRAP 3 IN VELCRO LATEX FREE

## (undated) DEVICE — SUT MONOCRYL 4-0 PS-2 27 IN Y426H

## (undated) DEVICE — INTENDED FOR TISSUE SEPARATION, AND OTHER PROCEDURES THAT REQUIRE A SHARP SURGICAL BLADE TO PUNCTURE OR CUT.: Brand: BARD-PARKER ® CARBON RIB-BACK BLADES